# Patient Record
Sex: FEMALE | Race: WHITE | Employment: OTHER | URBAN - METROPOLITAN AREA
[De-identification: names, ages, dates, MRNs, and addresses within clinical notes are randomized per-mention and may not be internally consistent; named-entity substitution may affect disease eponyms.]

---

## 2017-01-09 RX ORDER — OXYBUTYNIN CHLORIDE 10 MG/1
TABLET, EXTENDED RELEASE ORAL
Qty: 30 TAB | Refills: 0 | Status: SHIPPED | OUTPATIENT
Start: 2017-01-09 | End: 2018-02-22

## 2017-02-01 ENCOUNTER — TELEPHONE (OUTPATIENT)
Dept: INTERNAL MEDICINE CLINIC | Age: 82
End: 2017-02-01

## 2017-02-02 NOTE — TELEPHONE ENCOUNTER
Patient finds the cost of the medications to be not sustainable.   Will consider change the Insulin to a generic form

## 2017-02-10 ENCOUNTER — TELEPHONE (OUTPATIENT)
Dept: INTERNAL MEDICINE CLINIC | Age: 82
End: 2017-02-10

## 2017-02-10 NOTE — TELEPHONE ENCOUNTER
Patient called and would like for Dr Shalini Turcios to come visit her in [address]. She has some things she would like to discuss with him.

## 2017-02-16 ENCOUNTER — DOCUMENTATION ONLY (OUTPATIENT)
Dept: INTERNAL MEDICINE CLINIC | Age: 82
End: 2017-02-16

## 2017-02-16 DIAGNOSIS — Z79.4 CONTROLLED TYPE 2 DIABETES MELLITUS WITHOUT COMPLICATION, WITH LONG-TERM CURRENT USE OF INSULIN (HCC): Primary | ICD-10-CM

## 2017-02-16 DIAGNOSIS — E11.9 CONTROLLED TYPE 2 DIABETES MELLITUS WITHOUT COMPLICATION, WITH LONG-TERM CURRENT USE OF INSULIN (HCC): Primary | ICD-10-CM

## 2017-02-16 DIAGNOSIS — R35.1 NOCTURIA: ICD-10-CM

## 2017-02-16 DIAGNOSIS — I10 ESSENTIAL HYPERTENSION: ICD-10-CM

## 2017-02-16 DIAGNOSIS — E05.00 THYROTOXICOSIS WITH DIFFUSE GOITER AND WITHOUT THYROID STORM: ICD-10-CM

## 2017-02-17 NOTE — PROGRESS NOTES
The patient is being evaluated by me today at [facility] for the chief complaint of nocturia    HPI     The patient remains on medications for type II diabetes mellitus. The sugars are doing fairly well. The patient is ambulating better with physical therapy but the ambulation is still impaired. The patient complains with problems of urinary frequency - most marked with nocturia three times per night. Since that patient's ambulation is still impaired she often wets the bed. Review of Systems   Respiratory: Negative for shortness of breath. Cardiovascular: Negative for chest pain and leg swelling. Genitourinary: Positive for frequency. Nocturia       Allergies   Allergen Reactions    Nitrofurantoin Nausea and Vomiting       Current Outpatient Prescriptions   Medication Sig Dispense Refill    oxybutynin chloride XL (DITROPAN XL) 10 mg CR tablet TAKE 1 TABLET BY MOUTH AT BEDTIME FOR BLADDER CONTGROL 30 Tab 0    amLODIPine (NORVASC) 2.5 mg tablet Take  by mouth daily.  insulin glargine (LANTUS) 100 unit/mL injection 5 Units by SubCUTAneous route daily (with breakfast).  nateglinide (STARLIX) 60 mg tablet Take 60 mg by mouth Before breakfast, lunch, and dinner.  ascorbic acid, vitamin C, (VITAMIN C) 250 mg tablet Take  by mouth.  acetaminophen (TYLENOL) 650 mg suppository Insert  into rectum every four (4) hours as needed for Fever.  metoprolol succinate (TOPROL XL) 50 mg XL tablet Take  by mouth daily.  insulin lispro (HUMALOG) 100 unit/mL injection by SubCUTAneous route Before breakfast, lunch, dinner and at bedtime. Indications: 2-10 units      insulin lispro (HUMALOG) 100 unit/mL injection by SubCUTAneous route daily. Indications: 8 units      ferrous sulfate (FEOSOL) 325 mg (65 mg iron) tablet Take  by mouth Daily (before breakfast).  polyethylene glycol (MIRALAX) 17 gram packet Take 17 g by mouth daily.       butalbital-acetaminophen-caffeine (FIORICET) -40 mg per tablet Take 1 Tab by mouth every four (4) hours as needed for Pain.  glucose blood VI test strips (ASCENSIA CONTOUR) strip USE TO TEST BLOOD SUGARS twice TIMES DAILY dx.e11.9 300 Strip 3    insulin aspart (NOVOLOG) 100 unit/mL injection Inject 8 units daily (Patient taking differently: 6 Units by SubCUTAneous route once. Inject 8 units daily) 10 mL 1    TRUEPLUS LANCETS 30 gauge misc Use to check blood glucose up to twice daily DX:e11.9 300 Lancet 3    glimepiride (AMARYL) 2 mg tablet 1 tablet at breakfast.  1 tablet at supper 180 Tab 3    alendronate (FOSAMAX) 70 mg tablet TAKE 1 TABLET WEEKLY 12 Tab 3    lovastatin (MEVACOR) 20 mg tablet TAKE 1 TABLET DAILY 90 Tab 3    ASCENSIA CONTOUR strip USE TO TEST BLOOD SUGARS THREE TIMES DAILY 100 Strip 11    melatonin 3 mg tablet Take 3 mg by mouth nightly as needed.  methimazole (TAPAZOLE) 5 mg tablet Take 1 Tab by mouth daily. 90 Tab 3    cyanocobalamin (VITAMIN B-12) 1,000 mcg tablet Take 1,000 mcg by mouth daily.  omega-3 fatty acids-vitamin e (FISH OIL) 1,000 mg Cap Take 1 Cap by mouth two (2) times a day.          Past Medical History:   Diagnosis Date    Abnormality of gait     Acute upper respiratory infections of unspecified site     Broken shoulder     right    Chest pain     Chronic airway obstruction, not elsewhere classified     COPD     Diabetes ([address]     Essential hypertension, benign     Hearing loss     History of tachycardia     possible SVT    Hypercholesterolemia     Hyperlipidemia     Hypertension     Incontinence     Mitral valve prolapse     MVP (mitral valve prolapse)     OAB (overactive bladder)     Osteoarthritis     spine    Osteoporosis     Pure hypercholesterolemia     Senile osteoporosis     Thyroid disease     Thyrotoxicosis     Type II or unspecified type diabetes mellitus without mention of complication, not stated as uncontrolled     Urinary tract infection, site not specified Past Surgical History:   Procedure Laterality Date    HX KNEE REPLACEMENT  left    HX OPEN REDUCTION INTERNAL FIXATION Left     patellar fracture    HX OTHER SURGICAL Right     shoulder surgery    REMV CATARACT EXTRACAP,INSERT LENS,COMP  [id number]    bilateral       Social History     Social History    Marital status:      Spouse name: N/A    Number of children: N/A    Years of education: N/A     Occupational History    Not on file. Social History Main Topics    Smoking status: Never Smoker    Smokeless tobacco: Never Used    Alcohol use No    Drug use: No    Sexual activity: Not on file     Other Topics Concern    Not on file     Social History Narrative    No narrative on file       Patient does not have an advanced directive on file    VS:    78   P 82        Pulse Ox 95%  Pain: 0/10    Physical Exam   Cardiovascular: Normal rate and regular rhythm. Exam reveals no gallop. No murmur heard. Pulmonary/Chest: She has no wheezes. She has no rales. Abdominal: Soft. She exhibits no distension. There is no tenderness. Musculoskeletal: She exhibits no edema. No visits with results within 3 Month(s) from this visit. Latest known visit with results is:    Hospital Outpatient Visit on 10/24/2016   Component Date Value Ref Range Status    Hemoglobin A1c 10/24/2016 7.8* 4.2 - 5.6 % Final    Comment: (NOTE)  HbA1C Interpretive Ranges  <5.7              Normal  5.7 - 6.4         Consider Prediabetes  >6.5              Consider Diabetes      Est. average glucose 10/24/2016 177  mg/dL Final    Comment: (NOTE)  The eAG should be interpreted with patient characteristics in mind   since ethnicity, interindividual differences, red cell lifespan,   variation in rates of glycation, etc. may affect the validity of the   calculation.       Sodium 10/24/2016 138  136 - 145 mmol/L Final    Potassium 10/24/2016 4.2  3.5 - 5.5 mmol/L Final    Chloride 10/24/2016 102  100 - 108 mmol/L Final    CO2 10/24/2016 29  21 - 32 mmol/L Final    Anion gap 10/24/2016 7  3.0 - 18 mmol/L Final    Glucose 10/24/2016 118* 74 - 99 mg/dL Final    BUN 10/24/2016 32* 7.0 - 18 MG/DL Final    Creatinine 10/24/2016 0.86  0.6 - 1.3 MG/DL Final    BUN/Creatinine ratio 10/24/2016 37* 12 - 20   Final    GFR est AA 10/24/2016 >60  >60 ml/min/1.73m2 Final    GFR est non-AA 10/24/2016 >60  >60 ml/min/1.73m2 Final    Comment: (NOTE)  Estimated GFR is calculated using the Modification of Diet in Renal   Disease (MDRD) Study equation, reported for both  Americans   (GFRAA) and non- Americans (GFRNA), and normalized to 1.73m2   body surface area. The physician must decide which value applies to   the patient. The MDRD study equation should only be used in   individuals age 25 or older. It has not been validated for the   following: pregnant women, patients with serious comorbid conditions,   or on certain medications, or persons with extremes of body size,   muscle mass, or nutritional status.  Calcium 10/24/2016 9.2  8.5 - 10.1 MG/DL Final       .No results found for any visits on 02/16/17. Assessment / Plan:      ICD-10-CM ICD-9-CM    1. Controlled type 2 diabetes mellitus without complication, with long-term current use of insulin ([facility]) E11.9 250.00     Z79.4 V58.67    2. Essential hypertension I10 401.9    3. Nocturia R35.1 788.43    4. 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   Cheryle Albino states that she understands the treatment plan and agrees with the treatment plan    LOS:  [id number]

## 2017-02-24 ENCOUNTER — TELEPHONE (OUTPATIENT)
Dept: INTERNAL MEDICINE CLINIC | Age: 82
End: 2017-02-24

## 2017-02-24 NOTE — TELEPHONE ENCOUNTER
[facility] called to let Dr Ortiz Jensen know that Ms Robert Bran had 2 falls today. She broke her fall with her wrist.  She seems to be doing okay. She thinks the UTI contributed to her fall. They did receive the order for the amoxicillin.

## 2017-02-26 PROBLEM — R35.1 NOCTURIA: Status: ACTIVE | Noted: 2017-02-26

## 2017-03-06 ENCOUNTER — TELEPHONE (OUTPATIENT)
Dept: INTERNAL MEDICINE CLINIC | Age: 82
End: 2017-03-06

## 2017-03-06 NOTE — TELEPHONE ENCOUNTER
Unable to reach patient by phone and there's no voice mail to leave message--letter has been received.

## 2017-03-06 NOTE — TELEPHONE ENCOUNTER
Patient called to check and see if we received a letter that she mailed to us. It is a letter from [facility] that she needs Dr Blanche Jensen to look into. She would like for Yanelis Jasso to give her a call back at [phone] to discuss.

## 2017-03-16 RX ORDER — INSULIN ASPART 100 [IU]/ML
INJECTION, SOLUTION INTRAVENOUS; SUBCUTANEOUS
Qty: 10 ML | Refills: 1 | Status: SHIPPED | OUTPATIENT
Start: 2017-03-16 | End: 2017-05-08

## 2017-03-22 ENCOUNTER — HOSPITAL ENCOUNTER (OUTPATIENT)
Dept: LAB | Age: 82
Discharge: HOME | End: 2017-03-22
Payer: MEDICARE

## 2017-03-22 ENCOUNTER — OFFICE VISIT (OUTPATIENT)
Dept: INTERNAL MEDICINE CLINIC | Age: 82
End: 2017-03-22

## 2017-03-22 VITALS
BODY MASS INDEX: 18.4 KG/M2 | SYSTOLIC BLOOD PRESSURE: 110 MMHG | DIASTOLIC BLOOD PRESSURE: 60 MMHG | TEMPERATURE: 97.5 F | HEIGHT: 62 IN | HEART RATE: 60 BPM | WEIGHT: 100 LBS | RESPIRATION RATE: 16 BRPM

## 2017-03-22 DIAGNOSIS — Z86.79 HX OF SUBDURAL HEMATOMA: ICD-10-CM

## 2017-03-22 DIAGNOSIS — Z79.4 CONTROLLED TYPE 2 DIABETES MELLITUS WITHOUT COMPLICATION, WITH LONG-TERM CURRENT USE OF INSULIN (HCC): ICD-10-CM

## 2017-03-22 DIAGNOSIS — E11.9 CONTROLLED TYPE 2 DIABETES MELLITUS WITHOUT COMPLICATION, WITH LONG-TERM CURRENT USE OF INSULIN (HCC): Primary | ICD-10-CM

## 2017-03-22 DIAGNOSIS — I10 ESSENTIAL HYPERTENSION: ICD-10-CM

## 2017-03-22 DIAGNOSIS — E11.9 CONTROLLED TYPE 2 DIABETES MELLITUS WITHOUT COMPLICATION, WITH LONG-TERM CURRENT USE OF INSULIN (HCC): ICD-10-CM

## 2017-03-22 DIAGNOSIS — E05.90 HYPERTHYROIDISM: ICD-10-CM

## 2017-03-22 DIAGNOSIS — Z79.4 CONTROLLED TYPE 2 DIABETES MELLITUS WITHOUT COMPLICATION, WITH LONG-TERM CURRENT USE OF INSULIN (HCC): Primary | ICD-10-CM

## 2017-03-22 DIAGNOSIS — N39.0 URINARY TRACT INFECTION, SITE UNSPECIFIED: ICD-10-CM

## 2017-03-22 LAB
ALBUMIN SERPL BCP-MCNC: 3.5 G/DL (ref 3.4–5)
ALBUMIN/GLOB SERPL: 0.9 {RATIO} (ref 0.8–1.7)
ALP SERPL-CCNC: 50 U/L (ref 45–117)
ALT SERPL-CCNC: 23 U/L (ref 13–56)
ANION GAP BLD CALC-SCNC: 7 MMOL/L (ref 3–18)
AST SERPL W P-5'-P-CCNC: 19 U/L (ref 15–37)
BASOPHILS # BLD AUTO: 0 K/UL (ref 0–0.06)
BASOPHILS # BLD: 0 % (ref 0–2)
BILIRUB SERPL-MCNC: 0.5 MG/DL (ref 0.2–1)
BUN SERPL-MCNC: 38 MG/DL (ref 7–18)
BUN/CREAT SERPL: 32 (ref 12–20)
CALCIUM SERPL-MCNC: 9.4 MG/DL (ref 8.5–10.1)
CHLORIDE SERPL-SCNC: 100 MMOL/L (ref 100–108)
CO2 SERPL-SCNC: 29 MMOL/L (ref 21–32)
CREAT SERPL-MCNC: 1.2 MG/DL (ref 0.6–1.3)
DIFFERENTIAL METHOD BLD: ABNORMAL
EOSINOPHIL # BLD: 0.1 K/UL (ref 0–0.4)
EOSINOPHIL NFR BLD: 2 % (ref 0–5)
ERYTHROCYTE [DISTWIDTH] IN BLOOD BY AUTOMATED COUNT: 14.2 % (ref 11.6–14.5)
EST. AVERAGE GLUCOSE BLD GHB EST-MCNC: 163 MG/DL
GLOBULIN SER CALC-MCNC: 3.8 G/DL (ref 2–4)
GLUCOSE SERPL-MCNC: 219 MG/DL (ref 74–99)
HBA1C MFR BLD: 7.3 % (ref 4.2–5.6)
HCT VFR BLD AUTO: 28.8 % (ref 35–45)
HGB BLD-MCNC: 11.5 G/DL (ref 12–16)
LYMPHOCYTES # BLD AUTO: 13 % (ref 21–52)
LYMPHOCYTES # BLD: 0.7 K/UL (ref 0.9–3.6)
MCH RBC QN AUTO: 38.3 PG (ref 24–34)
MCHC RBC AUTO-ENTMCNC: 39.9 G/DL (ref 31–37)
MCV RBC AUTO: 96 FL (ref 74–97)
MONOCYTES # BLD: 0.7 K/UL (ref 0.05–1.2)
MONOCYTES NFR BLD AUTO: 13 % (ref 3–10)
NEUTS SEG # BLD: 3.7 K/UL (ref 1.8–8)
NEUTS SEG NFR BLD AUTO: 72 % (ref 40–73)
PLATELET # BLD AUTO: 245 K/UL (ref 135–420)
PMV BLD AUTO: 10.7 FL (ref 9.2–11.8)
POTASSIUM SERPL-SCNC: 4.9 MMOL/L (ref 3.5–5.5)
PROT SERPL-MCNC: 7.3 G/DL (ref 6.4–8.2)
RBC # BLD AUTO: 3 M/UL (ref 4.2–5.3)
SODIUM SERPL-SCNC: 136 MMOL/L (ref 136–145)
T3FREE SERPL-MCNC: 2.6 PG/ML (ref 2.3–4.2)
T4 FREE SERPL-MCNC: 0.7 NG/DL (ref 0.7–1.5)
TSH SERPL DL<=0.05 MIU/L-ACNC: 4.41 UIU/ML (ref 0.36–3.74)
WBC # BLD AUTO: 5.2 K/UL (ref 4.6–13.2)

## 2017-03-22 PROCEDURE — 85025 COMPLETE CBC W/AUTO DIFF WBC: CPT | Performed by: INTERNAL MEDICINE

## 2017-03-22 PROCEDURE — 80053 COMPREHEN METABOLIC PANEL: CPT | Performed by: INTERNAL MEDICINE

## 2017-03-22 PROCEDURE — 83036 HEMOGLOBIN GLYCOSYLATED A1C: CPT | Performed by: INTERNAL MEDICINE

## 2017-03-22 PROCEDURE — 84443 ASSAY THYROID STIM HORMONE: CPT | Performed by: INTERNAL MEDICINE

## 2017-03-22 PROCEDURE — 84439 ASSAY OF FREE THYROXINE: CPT | Performed by: INTERNAL MEDICINE

## 2017-03-22 PROCEDURE — 84481 FREE ASSAY (FT-3): CPT | Performed by: INTERNAL MEDICINE

## 2017-03-22 NOTE — PATIENT INSTRUCTIONS
Health Maintenance Due   Topic Date Due    Diabetic Foot Care  04/24/1936    Shingles Vaccine  04/24/1986    Pneumococcal Vaccine (1 of 2 - PCV13) 04/24/1991    Flu Vaccine  08/01/2016    Cholesterol Test   10/16/2016    Albumin Urine Test  01/19/2017

## 2017-03-22 NOTE — MR AVS SNAPSHOT
Visit Information Date & Time Provider Department Dept. Phone Encounter #  
 3/22/2017 10:15 AM Justus Maloney MD [facility] INTERNAL MEDICINE OF [place] [phone] [id number] Your Appointments 4/28/2017 10:30 AM  
ULTRASOUND with [facility] ULTRASOUND [facility] ([address]) Appt Note: JESSICA- JBM  
 [address] [place] [address] [id number]  
  
    
 5/24/2017  8:30 AM  
ESTABLISHED PATIENT with Michael Escoto MD  
[facility] of [facility] [address]) Appt Note: annual f.u rv JESSICA  
 [address], [place] [address]  
  
   
 [address] [facility][1], [address] [facility] [address] Upcoming Health Maintenance Date Due  
 FOOT EXAM Q1 4/24/1936 ZOSTER VACCINE AGE 60> 4/24/1986 Pneumococcal 65+ Low/Medium Risk (1 of 2 - PCV13) 4/24/1991 INFLUENZA AGE 9 TO ADULT 8/1/2016 LIPID PANEL Q1 10/16/2016 MICROALBUMIN Q1 1/19/2017 HEMOGLOBIN A1C Q6M 4/24/2017 MEDICARE YEARLY EXAM 7/22/2017 EYE EXAM RETINAL OR DILATED Q1 2/1/2018 GLAUCOMA SCREENING Q2Y 2/1/2019 DTaP/Tdap/Td series (2 - Td) 3/9/2025 Allergies as of 3/22/2017  Review Complete On: 3/22/2017 By: Richy Bar LPN Severity Noted Reaction Type Reactions Nitrofurantoin  12/02/2015   Systemic Nausea and Vomiting Current Immunizations  Never Reviewed Name Date Influenza High Dose Vaccine PF 9/22/2015 Tdap 3/9/2015  9:28 PM  
  
 Not reviewed this visit You Were Diagnosed With   
  
 Codes Comments Controlled type 2 diabetes mellitus without complication, with long-term current use of insulin (HCC)    -  Primary ICD-10-CM: E11.9, Z79.4 ICD-9-CM: 250.00, V58.67 Essential hypertension     ICD-10-CM: I10 
ICD-9-CM: 401.9 Urinary tract infection, site unspecified     ICD-10-CM: N39.0 Hyperthyroidism     ICD-10-CM: E05.90 ICD-9-CM: 242.90 Hx of subdural hematoma     ICD-10-CM: Z86.79 
ICD-9-CM: V12.59 Vitals BP Pulse Temp Resp Height(growth percentile) 110/60 (BP 1 Location: Left arm, BP Patient Position: Sitting) 60 97.5 °F (36.4 °C) (Tympanic) 16 5' 2\" (1.575 m) Weight(growth percentile) BMI OB Status Smoking Status 100 lb (45.4 kg) 18.29 kg/m2 Postmenopausal Never Smoker Vitals History BMI and BSA Data Body Mass Index Body Surface Area  
 18.29 kg/m 2 1.41 m 2 Preferred Pharmacy Pharmacy Name Phone [facility] [address] [address] [facility] 2/3 [phone] Your Updated Medication List  
  
   
This list is accurate as of: 3/22/17 12:04 PM.  Always use your most recent med list.  
  
  
  
  
 acetaminophen 650 mg suppository Commonly known as:  TYLENOL Insert  into rectum every four (4) hours as needed for Fever. alendronate 70 mg tablet Commonly known as:  FOSAMAX TAKE 1 TABLET WEEKLY * Ascensia CONTOUR strip Generic drug:  glucose blood VI test strips USE TO TEST BLOOD SUGARS THREE TIMES DAILY * glucose blood VI test strips strip Commonly known as:  Ascensia CONTOUR  
USE TO TEST BLOOD SUGARS twice TIMES DAILY dx.e11.9 FEOSOL 325 mg (65 mg iron) tablet Generic drug:  ferrous sulfate Take  by mouth Daily (before breakfast). FIORICET -40 mg per tablet Generic drug:  butalbital-acetaminophen-caffeine Take 1 Tab by mouth every four (4) hours as needed for Pain. FISH OIL 1,000 mg Cap Generic drug:  omega-3 fatty acids-vitamin e Take 1 Cap by mouth two (2) times a day. glimepiride 2 mg tablet Commonly known as:  AMARYL  
1 tablet at breakfast.  1 tablet at supper  
  
 insulin aspart 100 unit/mL injection Commonly known as:  Chava Obrien Inject 8 units daily * insulin lispro 100 unit/mL injection Commonly known as:  HUMALOG by SubCUTAneous route Before breakfast, lunch, dinner and at bedtime. Indications: 2-10 units * insulin lispro 100 unit/mL injection Commonly known as:  HUMALOG  
by SubCUTAneous route daily. Indications: 8 units LANTUS 100 unit/mL injection Generic drug:  insulin glargine 5 Units by SubCUTAneous route daily (with breakfast). lovastatin 20 mg tablet Commonly known as:  MEVACOR  
TAKE 1 TABLET DAILY  
  
 melatonin 3 mg tablet Take 3 mg by mouth nightly as needed. methIMAzole 5 mg tablet Commonly known as:  TAPAZOLE Take 1 Tab by mouth daily. MIRALAX 17 gram packet Generic drug:  polyethylene glycol Take 17 g by mouth daily. NORVASC 2.5 mg tablet Generic drug:  amLODIPine Take  by mouth daily. oxybutynin chloride XL 10 mg CR tablet Commonly known as:  DITROPAN XL  
TAKE 1 TABLET BY MOUTH AT BEDTIME FOR BLADDER CONTGROL STARLIX 60 mg tablet Generic drug:  nateglinide Take 60 mg by mouth Before breakfast, lunch, and dinner. TOPROL XL 50 mg XL tablet Generic drug:  metoprolol succinate Take  by mouth daily. TRUEPLUS LANCETS 30 gauge Misc Generic drug:  lancets Use to check blood glucose up to twice daily DX:e11.9 VITAMIN B-12 1,000 mcg tablet Generic drug:  cyanocobalamin Take 1,000 mcg by mouth daily. VITAMIN C 250 mg tablet Generic drug:  ascorbic acid (vitamin C) Take  by mouth. * Notice: This list has 4 medication(s) that are the same as other medications prescribed for you. Read the directions carefully, and ask your doctor or other care provider to review them with you. Patient Instructions Health Maintenance Due Topic Date Due  
 Diabetic Foot Care  04/24/1936  Shingles Vaccine  04/24/1986  Pneumococcal Vaccine (1 of 2 - PCV13) 04/24/1991  Flu Vaccine  08/01/2016  Cholesterol Test   10/16/2016  Albumin Urine Test  01/19/2017 Introducing [facility]! Sonja Munroe introduces [employer] patient portal. Now you can access parts of your medical record, email your doctor's office, and request medication refills online. 1. In your internet browser, go to [email or web address] 2. Click on the First Time User? Click Here link in the Sign In box. You will see the New Member Sign Up page. 3. Enter your [employer] Access Code exactly as it appears below. You will not need to use this code after youve completed the sign-up process. If you do not sign up before the expiration date, you must request a new code. · [employer] Access Code: [email or web address] Expires: 6/20/2017 12:04 PM 
 
4. Enter the last four digits of your Social Security Number (xxxx) and Date of Birth (mm/dd/yyyy) as indicated and click Submit. You will be taken to the next sign-up page. 5. Create a [employer] ID. This will be your [employer] login ID and cannot be changed, so think of one that is secure and easy to remember. 6. Create a [employer] password. You can change your password at any time. 7. Enter your Password Reset Question and Answer. This can be used at a later time if you forget your password. 8. Enter your e-mail address. You will receive e-mail notification when new information is available in [address] 9. Click Sign Up. You can now view and download portions of your medical record. 10. Click the Download Summary menu link to download a portable copy of your medical information. If you have questions, please visit the Frequently Asked Questions section of the [employer] website. Remember, [employer] is NOT to be used for urgent needs. For medical emergencies, dial 911. Now available from your iPhone and Android! Please provide this summary of care documentation to your next provider. Your primary care clinician is listed as Afua Poon. If you have any questions after today's visit, please call [phone].

## 2017-03-22 NOTE — PROGRESS NOTES
1. Have you been to the ER, urgent care clinic since your last visit? Hospitalized since your last visit? No    2. Have you seen or consulted any other health care providers outside of the [address] [place] [address] since your last visit? Include any pap smears or colon screening.  No

## 2017-03-22 NOTE — PROGRESS NOTES
The patient presents to the office today with the chief complaint of Diabetes Mellitus    HPI    The patient remains on Lantus and a short acting insulin for type II diabetes mellitus. she has her  blood sugars twice daily. The sugars are doing ok. The patient has not had any low sugars. The patient remains on medications for hyperthyroidism. she is tolerating the medications well. The patient's weight is stable despite her complaints regarding the menus available at the adult home. The patient persists with dysuria. Review of Systems   Respiratory: Negative for shortness of breath. Cardiovascular: Negative for chest pain and leg swelling. Genitourinary: Positive for dysuria. Allergies   Allergen Reactions    Nitrofurantoin Nausea and Vomiting       Current Outpatient Prescriptions   Medication Sig Dispense Refill    insulin aspart (NOVOLOG) 100 unit/mL injection Inject 8 units daily 10 mL 1    oxybutynin chloride XL (DITROPAN XL) 10 mg CR tablet TAKE 1 TABLET BY MOUTH AT BEDTIME FOR BLADDER CONTGROL 30 Tab 0    amLODIPine (NORVASC) 2.5 mg tablet Take  by mouth daily.  insulin glargine (LANTUS) 100 unit/mL injection 5 Units by SubCUTAneous route daily (with breakfast).  nateglinide (STARLIX) 60 mg tablet Take 60 mg by mouth Before breakfast, lunch, and dinner.  ascorbic acid, vitamin C, (VITAMIN C) 250 mg tablet Take  by mouth.  acetaminophen (TYLENOL) 650 mg suppository Insert  into rectum every four (4) hours as needed for Fever.  metoprolol succinate (TOPROL XL) 50 mg XL tablet Take  by mouth daily.  insulin lispro (HUMALOG) 100 unit/mL injection by SubCUTAneous route Before breakfast, lunch, dinner and at bedtime. Indications: 2-10 units      insulin lispro (HUMALOG) 100 unit/mL injection by SubCUTAneous route daily. Indications: 8 units      ferrous sulfate (FEOSOL) 325 mg (65 mg iron) tablet Take  by mouth Daily (before breakfast).       polyethylene glycol (MIRALAX) 17 gram packet Take 17 g by mouth daily.  butalbital-acetaminophen-caffeine (FIORICET) -40 mg per tablet Take 1 Tab by mouth every four (4) hours as needed for Pain.  glucose blood VI test strips (ASCENSIA CONTOUR) strip USE TO TEST BLOOD SUGARS twice TIMES DAILY dx.e11.9 300 Strip 3    TRUEPLUS LANCETS 30 gauge misc Use to check blood glucose up to twice daily DX:e11.9 300 Lancet 3    glimepiride (AMARYL) 2 mg tablet 1 tablet at breakfast.  1 tablet at supper 180 Tab 3    alendronate (FOSAMAX) 70 mg tablet TAKE 1 TABLET WEEKLY 12 Tab 3    lovastatin (MEVACOR) 20 mg tablet TAKE 1 TABLET DAILY 90 Tab 3    ASCENSIA CONTOUR strip USE TO TEST BLOOD SUGARS THREE TIMES DAILY 100 Strip 11    melatonin 3 mg tablet Take 3 mg by mouth nightly as needed.  methimazole (TAPAZOLE) 5 mg tablet Take 1 Tab by mouth daily. 90 Tab 3    cyanocobalamin (VITAMIN B-12) 1,000 mcg tablet Take 1,000 mcg by mouth daily.  omega-3 fatty acids-vitamin e (FISH OIL) 1,000 mg Cap Take 1 Cap by mouth two (2) times a day.          Past Medical History:   Diagnosis Date    Abnormality of gait     Acute upper respiratory infections of unspecified site     Broken shoulder     right    Chest pain     Chronic airway obstruction, not elsewhere classified ([address]     COPD     Diabetes ([address]     Essential hypertension, benign     Hearing loss     History of tachycardia     possible SVT    Hypercholesterolemia     Hyperlipidemia     Hypertension     Incontinence     Mitral valve prolapse     MVP (mitral valve prolapse)     OAB (overactive bladder)     Osteoarthritis     spine    Osteoporosis     Pure hypercholesterolemia     Senile osteoporosis     Thyroid disease     Thyrotoxicosis     Type II or unspecified type diabetes mellitus without mention of complication, not stated as uncontrolled     Urinary tract infection, site not specified        Past Surgical History:   Procedure Laterality Date    HX KNEE REPLACEMENT  left    HX OPEN REDUCTION INTERNAL FIXATION Left     patellar fracture    HX OTHER SURGICAL Right     shoulder surgery    REMV CATARACT EXTRACAP,INSERT LENS,COMP  [id number]    bilateral       Social History     Social History    Marital status:      Spouse name: N/A    Number of children: N/A    Years of education: N/A     Occupational History    Not on file. Social History Main Topics    Smoking status: Never Smoker    Smokeless tobacco: Never Used    Alcohol use No    Drug use: No    Sexual activity: No     Other Topics Concern    Not on file     Social History Narrative       Patient does not have an advanced directive on file    Visit Vitals    /60 (BP 1 Location: Left arm, BP Patient Position: Sitting)    Pulse 60    Temp 97.5 °F (36.4 °C) (Tympanic)    Resp 16    Ht 5' 2\" (1.575 m)    Wt 100 lb (45.4 kg)    BMI 18.29 kg/m2       Physical Exam   Neck: Carotid bruit is not present. No thyromegaly present. Cardiovascular: Normal rate and regular rhythm. Exam reveals no gallop. No murmur heard. Pulmonary/Chest: She has no wheezes. She has no rales. Abdominal: Soft. She exhibits no distension. There is no tenderness. Musculoskeletal: She exhibits no edema. Lymphadenopathy:     She has no cervical adenopathy.          BMI:  Low due to thyroid disease but stable    Hospital Outpatient Visit on 03/22/2017   Component Date Value Ref Range Status    WBC 03/22/2017 5.2  4.6 - 13.2 K/uL Final    RBC 03/22/2017 3.00* 4.20 - 5.30 M/uL Final    HGB 03/22/2017 11.5* 12.0 - 16.0 g/dL Final    HCT 03/22/2017 28.8* 35.0 - 45.0 % Final    MCV 03/22/2017 96.0  74.0 - 97.0 FL Final    MCH 03/22/2017 38.3* 24.0 - 34.0 PG Final    MCHC 03/22/2017 39.9* 31.0 - 37.0 g/dL Final    RDW 03/22/2017 14.2  11.6 - 14.5 % Final    PLATELET 38/60/5180 634  135 - 420 K/uL Final    MPV 03/22/2017 10.7  9.2 - 11.8 FL Final    NEUTROPHILS 03/22/2017 72  40 - 73 % Final    LYMPHOCYTES 03/22/2017 13* 21 - 52 % Final    MONOCYTES 03/22/2017 13* 3 - 10 % Final    EOSINOPHILS 03/22/2017 2  0 - 5 % Final    BASOPHILS 03/22/2017 0  0 - 2 % Final    ABS. NEUTROPHILS 03/22/2017 3.7  1.8 - 8.0 K/UL Final    ABS. LYMPHOCYTES 03/22/2017 0.7* 0.9 - 3.6 K/UL Final    ABS. MONOCYTES 03/22/2017 0.7  0.05 - 1.2 K/UL Final    ABS. EOSINOPHILS 03/22/2017 0.1  0.0 - 0.4 K/UL Final    ABS. BASOPHILS 03/22/2017 0.0  0.0 - 0.06 K/UL Final    DF 03/22/2017 AUTOMATED    Final    Triiodothyronine (T3), free 03/22/2017 2.6  2.3 - 4.2 PG/ML Final    T4, Free 03/22/2017 0.7  0.7 - 1.5 NG/DL Final    Sodium 03/22/2017 136  136 - 145 mmol/L Final    Potassium 03/22/2017 4.9  3.5 - 5.5 mmol/L Final    Chloride 03/22/2017 100  100 - 108 mmol/L Final    CO2 03/22/2017 29  21 - 32 mmol/L Final    Anion gap 03/22/2017 7  3.0 - 18 mmol/L Final    Glucose 03/22/2017 219* 74 - 99 mg/dL Final    BUN 03/22/2017 38* 7.0 - 18 MG/DL Final    Creatinine 03/22/2017 1.20  0.6 - 1.3 MG/DL Final    BUN/Creatinine ratio 03/22/2017 32* 12 - 20   Final    GFR est AA 03/22/2017 51* >60 ml/min/1.73m2 Final    GFR est non-AA 03/22/2017 42* >60 ml/min/1.73m2 Final    Comment: (NOTE)  Estimated GFR is calculated using the Modification of Diet in Renal   Disease (MDRD) Study equation, reported for both  Americans   (GFRAA) and non- Americans (GFRNA), and normalized to 1.73m2   body surface area. The physician must decide which value applies to   the patient. The MDRD study equation should only be used in   individuals age 25 or older. It has not been validated for the   following: pregnant women, patients with serious comorbid conditions,   or on certain medications, or persons with extremes of body size,   muscle mass, or nutritional status.       Calcium 03/22/2017 9.4  8.5 - 10.1 MG/DL Final    Bilirubin, total 03/22/2017 0.5  0.2 - 1.0 MG/DL Final    ALT (SGPT) 03/22/2017 23 13 - 56 U/L Final    AST (SGOT) 03/22/2017 19  15 - 37 U/L Final    Alk. phosphatase 03/22/2017 50  45 - 117 U/L Final    Protein, total 03/22/2017 7.3  6.4 - 8.2 g/dL Final    Albumin 03/22/2017 3.5  3.4 - 5.0 g/dL Final    Globulin 03/22/2017 3.8  2.0 - 4.0 g/dL Final    A-G Ratio 03/22/2017 0.9  0.8 - 1.7   Final    TSH 03/22/2017 4.41* 0.36 - 3.74 uIU/mL Final    Hemoglobin A1c 03/22/2017 7.3* 4.2 - 5.6 % Final    Comment: (NOTE)  HbA1C Interpretive Ranges  <5.7              Normal  5.7 - 6.4         Consider Prediabetes  >6.5              Consider Diabetes      Est. average glucose 03/22/2017 163  mg/dL Final    Comment: (NOTE)  The eAG should be interpreted with patient characteristics in mind   since ethnicity, interindividual differences, red cell lifespan,   variation in rates of glycation, etc. may affect the validity of the   calculation. .  Results for orders placed or performed during the hospital encounter of 03/22/17   CBC WITH AUTOMATED DIFF   Result Value Ref Range    WBC 5.2 4.6 - 13.2 K/uL    RBC 3.00 (L) 4.20 - 5.30 M/uL    HGB 11.5 (L) 12.0 - 16.0 g/dL    HCT 28.8 (L) 35.0 - 45.0 %    MCV 96.0 74.0 - 97.0 FL    MCH 38.3 (H) 24.0 - 34.0 PG    MCHC 39.9 (H) 31.0 - 37.0 g/dL    RDW 14.2 11.6 - 14.5 %    PLATELET 323 689 - 243 K/uL    MPV 10.7 9.2 - 11.8 FL    NEUTROPHILS 72 40 - 73 %    LYMPHOCYTES 13 (L) 21 - 52 %    MONOCYTES 13 (H) 3 - 10 %    EOSINOPHILS 2 0 - 5 %    BASOPHILS 0 0 - 2 %    ABS. NEUTROPHILS 3.7 1.8 - 8.0 K/UL    ABS. LYMPHOCYTES 0.7 (L) 0.9 - 3.6 K/UL    ABS. MONOCYTES 0.7 0.05 - 1.2 K/UL    ABS. EOSINOPHILS 0.1 0.0 - 0.4 K/UL    ABS.  BASOPHILS 0.0 0.0 - 0.06 K/UL    DF AUTOMATED     T3, FREE   Result Value Ref Range    Triiodothyronine (T3), free 2.6 2.3 - 4.2 PG/ML   T4, FREE   Result Value Ref Range    T4, Free 0.7 0.7 - 1.5 NG/DL   METABOLIC PANEL, COMPREHENSIVE   Result Value Ref Range    Sodium 136 136 - 145 mmol/L    Potassium 4.9 3.5 - 5.5 mmol/L Chloride 100 100 - 108 mmol/L    CO2 29 21 - 32 mmol/L    Anion gap 7 3.0 - 18 mmol/L    Glucose 219 (H) 74 - 99 mg/dL    BUN 38 (H) 7.0 - 18 MG/DL    Creatinine 1.20 0.6 - 1.3 MG/DL    BUN/Creatinine ratio 32 (H) 12 - 20      GFR est AA 51 (L) >60 ml/min/1.73m2    GFR est non-AA 42 (L) >60 ml/min/1.73m2    Calcium 9.4 8.5 - 10.1 MG/DL    Bilirubin, total 0.5 0.2 - 1.0 MG/DL    ALT (SGPT) 23 13 - 56 U/L    AST (SGOT) 19 15 - 37 U/L    Alk. phosphatase 50 45 - 117 U/L    Protein, total 7.3 6.4 - 8.2 g/dL    Albumin 3.5 3.4 - 5.0 g/dL    Globulin 3.8 2.0 - 4.0 g/dL    A-G Ratio 0.9 0.8 - 1.7     TSH 3RD GENERATION   Result Value Ref Range    TSH 4.41 (H) 0.36 - 3.74 uIU/mL   HEMOGLOBIN A1C   Result Value Ref Range    Hemoglobin A1c 7.3 (H) 4.2 - 5.6 %    Est. average glucose 163 mg/dL       Assessment / Plan      ICD-10-CM ICD-9-CM    1. Controlled type 2 diabetes mellitus without complication, with long-term current use of insulin ([facility]) R58.8 710.42 METABOLIC PANEL, COMPREHENSIVE    Z79.4 V58.67 CBC WITH AUTOMATED DIFF      TSH 3RD GENERATION      HEMOGLOBIN A1C WITH EAG      T3, FREE      T4, FREE      MA COLLECTION VENOUS BLOOD,VENIPUNCTURE   2. Essential hypertension E29 765.8 METABOLIC PANEL, COMPREHENSIVE      CBC WITH AUTOMATED DIFF      TSH 3RD GENERATION      HEMOGLOBIN A1C WITH EAG      T3, FREE      T4, FREE      MA COLLECTION VENOUS BLOOD,VENIPUNCTURE   3. Urinary tract infection, site unspecified O90.7  METABOLIC PANEL, COMPREHENSIVE      CBC WITH AUTOMATED DIFF      TSH 3RD GENERATION      HEMOGLOBIN A1C WITH EAG      T3, FREE      T4, FREE      MA COLLECTION VENOUS BLOOD,VENIPUNCTURE   4. Hyperthyroidism T40.77 847.82 METABOLIC PANEL, COMPREHENSIVE      CBC WITH AUTOMATED DIFF      TSH 3RD GENERATION      HEMOGLOBIN A1C WITH EAG      T3, FREE      T4, FREE      MA COLLECTION VENOUS BLOOD,VENIPUNCTURE   5.  Hx of subdural hematoma U84.98 P83.80 METABOLIC PANEL, COMPREHENSIVE      CBC WITH AUTOMATED DIFF TSH 3RD GENERATION      HEMOGLOBIN A1C WITH EAG      T3, FREE      T4, FREE      ME COLLECTION VENOUS BLOOD,VENIPUNCTURE     Add Pyridium  Discontinue Mevacor and Fosamax due to cost  she was advised to continue her maintenance medications    Follow-up Disposition:  Return in about 6 months (around 9/22/2017). I asked Genet Vays if she has any questions and I answered the questions.   Jessicajairon Analiliasusy states that she understands the treatment plan and agrees with the treatment plan

## 2017-04-05 ENCOUNTER — TELEPHONE (OUTPATIENT)
Dept: INTERNAL MEDICINE CLINIC | Age: 82
End: 2017-04-05

## 2017-04-07 NOTE — TELEPHONE ENCOUNTER
Unable to reach patient or leave a message--per Dr Jorge Waterman labs are ok except for elevated A1C-patient needs to make sure she is accepting the insulin at the facility. TSH also sightly elevated but T3 and T4 are ok so will leave alone for now.

## 2017-04-19 ENCOUNTER — DOCUMENTATION ONLY (OUTPATIENT)
Dept: INTERNAL MEDICINE CLINIC | Age: 82
End: 2017-04-19

## 2017-04-19 DIAGNOSIS — Z86.79 HX OF SUBDURAL HEMATOMA: ICD-10-CM

## 2017-04-19 DIAGNOSIS — I10 ESSENTIAL HYPERTENSION: ICD-10-CM

## 2017-04-19 DIAGNOSIS — Z79.4 CONTROLLED TYPE 2 DIABETES MELLITUS WITHOUT COMPLICATION, WITH LONG-TERM CURRENT USE OF INSULIN (HCC): Primary | ICD-10-CM

## 2017-04-19 DIAGNOSIS — E11.9 CONTROLLED TYPE 2 DIABETES MELLITUS WITHOUT COMPLICATION, WITH LONG-TERM CURRENT USE OF INSULIN (HCC): Primary | ICD-10-CM

## 2017-04-19 DIAGNOSIS — R35.1 NOCTURIA: ICD-10-CM

## 2017-04-20 NOTE — PROGRESS NOTES
The patient is being evaluated by me today at [facility] for the chief complaint of type II diabetes mellitus    HPI     The patient remains on both long term and short term insulin for type II diabetes mellitus. The patient's sugars have been doing ok. The patient is status post drainage of a subdural hematoma. She is doing ok but she continues with impaired ambulation. The patient is using a walker for safe ambulation. The patient complains of nocturia with problems with bladder control. The patient remains on medications for hypertension. She is tolerating the medications well. Review of Systems   Respiratory: Negative for shortness of breath. Cardiovascular: Negative for chest pain and leg swelling. Neurological: Positive for weakness. Allergies   Allergen Reactions    Nitrofurantoin Nausea and Vomiting       Current Outpatient Prescriptions   Medication Sig Dispense Refill    morphine (ROXANOL) 20 mg/mL concentrated oral syringe Take 0.25 mL by mouth every two (2) hours as needed for Pain (shortness of breath). Max Daily Amount: 60 mg. 30 mL 0    LORazepam (LORAZEPAM INTENSOL) 2 mg/mL concentrated solution Take 0.5 mL by mouth every four (4) hours as needed for Agitation or Anxiety (oral or sublingual). Max Daily Amount: 6 mg.  30 mL 0     Facility-Administered Medications Ordered in Other Visits   Medication Dose Route Frequency Provider Last Rate Last Dose    mineral oil (FLEET) enema   Rectal PRN Gely Romero MD        heparin (porcine) injection 5,000 Units  5,000 Units SubCUTAneous Q8H Gely Romero MD   5,000 Units at 05/08/17 0402    insulin lispro (HUMALOG) injection   SubCUTAneous AC&HS Gely Romero MD   Stopped at 05/07/17 2326    glucose chewable tablet 16 g  4 Tab Oral PRN Gely Romero MD        glucagon [facility]) injection 1 mg  1 mg IntraMUSCular PRN Gely Romero MD        dextrose (D50W) injection syrg 12.5-25 g  25-50 mL IntraVENous PRN Gely Eastern, MD  acetaminophen (TYLENOL) tablet 325 mg  325 mg Oral Q6H PRN Darlene Emersono, NP   325 mg at 05/05/17 1421    acetaminophen (TYLENOL) tablet 650 mg  650 mg Oral BID Darlene Peto, NP   650 mg at 05/07/17 1707    amLODIPine (NORVASC) tablet 5 mg  5 mg Oral DAILY BÁRBARA Garcia   5 mg at 05/07/17 2847    0.9% sodium chloride infusion  50 mL/hr IntraVENous CONTINUOUS Shirley Marks MD 50 mL/hr at 05/07/17 2053 50 mL/hr at 05/07/17 2053    polyethylene glycol (MIRALAX) packet 17 g  17 g Oral DAILY Shirley Marks MD   17 g at 05/07/17 0900    cyanocobalamin tablet 1,000 mcg  1,000 mcg Oral DAILY Leonarda Cardenas MD   1,000 mcg at 05/07/17 1350    ferrous sulfate tablet 325 mg  1 Tab Oral ACB Leonarda Cardenas MD   325 mg at 05/07/17 0814    melatonin tablet 3 mg  3 mg Oral QHS PRN Leonarda Cardenas MD   3 mg at 05/05/17 2128    methIMAzole (TAPAZOLE) tablet 5 mg  5 mg Oral DAILY Leonarda Cardenas MD   5 mg at 05/07/17 8774    sodium chloride (NS) flush 5-10 mL  5-10 mL IntraVENous Q8H Leonarda Cardenas MD   10 mL at 05/07/17 2326    sodium chloride (NS) flush 5-10 mL  5-10 mL IntraVENous PRN Leonarda Cardenas MD        magnesium hydroxide (MILK OF MAGNESIA) 400 mg/5 mL oral suspension 30 mL  30 mL Oral DAILY PRN Leonarda Cardenas MD   30 mL at 05/03/17 0103    docusate sodium (COLACE) capsule 100 mg  100 mg Oral BID Leonarda Cardeans MD   100 mg at 05/07/17 1706    aspirin chewable tablet 81 mg  81 mg Oral DAILY Leonarda Cardenas MD   81 mg at 05/07/17 1195    clopidogrel (PLAVIX) tablet 75 mg  75 mg Oral DAILY Leonarda Cardenas MD   75 mg at 05/07/17 6712    simvastatin (ZOCOR) tablet 10 mg  10 mg Oral QHS Leonarda Cardenas MD   10 mg at 05/07/17 2326       Past Medical History:   Diagnosis Date    Abnormality of gait     Acute upper respiratory infections of unspecified site     Broken shoulder     right    Chest pain     Chronic airway obstruction, not elsewhere classified     COPD     [facility] [address]     Essential hypertension, benign     Hearing loss     History of tachycardia     possible SVT    Hypercholesterolemia     Hyperlipidemia     Hypertension     Incontinence     Mitral valve prolapse     MVP (mitral valve prolapse)     OAB (overactive bladder)     Osteoarthritis     spine    Osteoporosis     Pure hypercholesterolemia     Senile osteoporosis     Thyroid disease     Thyrotoxicosis     Type II or unspecified type diabetes mellitus without mention of complication, not stated as uncontrolled     Urinary tract infection, site not specified        Past Surgical History:   Procedure Laterality Date    HX KNEE REPLACEMENT  left    HX OPEN REDUCTION INTERNAL FIXATION Left     patellar fracture    HX OTHER SURGICAL Right     shoulder surgery    REMV CATARACT EXTRACAP,INSERT LENS,COMP  [id number]    bilateral       Social History     Social History    Marital status:      Spouse name: N/A    Number of children: N/A    Years of education: N/A     Occupational History    Not on file. Social History Main Topics    Smoking status: Never Smoker    Smokeless tobacco: Never Used    Alcohol use No    Drug use: No    Sexual activity: No     Other Topics Concern    Not on file     Social History Narrative       Patient does not have an advanced directive on file    VS:   /56   P 50     Pulse Ox 96%  Pain: 2/10    Physical Exam   Neck: Carotid bruit is not present. Cardiovascular: Normal rate and regular rhythm. Exam reveals no gallop. No murmur heard. Pulmonary/Chest: She has no wheezes. She has no rales. Abdominal: Soft. Bowel sounds are normal. She exhibits no distension. There is no tenderness. Musculoskeletal: She exhibits no edema.        Hospital Outpatient Visit on 03/22/2017   Component Date Value Ref Range Status    WBC 03/22/2017 5.2  4.6 - 13.2 K/uL Final    RBC 03/22/2017 3.00* 4.20 - 5.30 M/uL Final    HGB 03/22/2017 11.5* 12.0 - 16.0 g/dL Final    HCT 03/22/2017 28.8* 35.0 - 45.0 % Final    MCV 03/22/2017 96.0  74.0 - 97.0 FL Final    MCH 03/22/2017 38.3* 24.0 - 34.0 PG Final    MCHC 03/22/2017 39.9* 31.0 - 37.0 g/dL Final    RDW 03/22/2017 14.2  11.6 - 14.5 % Final    PLATELET 36/80/1046 046  135 - 420 K/uL Final    MPV 03/22/2017 10.7  9.2 - 11.8 FL Final    NEUTROPHILS 03/22/2017 72  40 - 73 % Final    LYMPHOCYTES 03/22/2017 13* 21 - 52 % Final    MONOCYTES 03/22/2017 13* 3 - 10 % Final    EOSINOPHILS 03/22/2017 2  0 - 5 % Final    BASOPHILS 03/22/2017 0  0 - 2 % Final    ABS. NEUTROPHILS 03/22/2017 3.7  1.8 - 8.0 K/UL Final    ABS. LYMPHOCYTES 03/22/2017 0.7* 0.9 - 3.6 K/UL Final    ABS. MONOCYTES 03/22/2017 0.7  0.05 - 1.2 K/UL Final    ABS. EOSINOPHILS 03/22/2017 0.1  0.0 - 0.4 K/UL Final    ABS. BASOPHILS 03/22/2017 0.0  0.0 - 0.06 K/UL Final    DF 03/22/2017 AUTOMATED    Final    Triiodothyronine (T3), free 03/22/2017 2.6  2.3 - 4.2 PG/ML Final    T4, Free 03/22/2017 0.7  0.7 - 1.5 NG/DL Final    Sodium 03/22/2017 136  136 - 145 mmol/L Final    Potassium 03/22/2017 4.9  3.5 - 5.5 mmol/L Final    Chloride 03/22/2017 100  100 - 108 mmol/L Final    CO2 03/22/2017 29  21 - 32 mmol/L Final    Anion gap 03/22/2017 7  3.0 - 18 mmol/L Final    Glucose 03/22/2017 219* 74 - 99 mg/dL Final    BUN 03/22/2017 38* 7.0 - 18 MG/DL Final    Creatinine 03/22/2017 1.20  0.6 - 1.3 MG/DL Final    BUN/Creatinine ratio 03/22/2017 32* 12 - 20   Final    GFR est AA 03/22/2017 51* >60 ml/min/1.73m2 Final    GFR est non-AA 03/22/2017 42* >60 ml/min/1.73m2 Final    Comment: (NOTE)  Estimated GFR is calculated using the Modification of Diet in Renal   Disease (MDRD) Study equation, reported for both  Americans   (GFRAA) and non- Americans (GFRNA), and normalized to 1.73m2   body surface area. The physician must decide which value applies to   the patient. The MDRD study equation should only be used in   individuals age 25 or older.  It has not been validated for the   following: pregnant women, patients with serious comorbid conditions,   or on certain medications, or persons with extremes of body size,   muscle mass, or nutritional status.  Calcium 03/22/2017 9.4  8.5 - 10.1 MG/DL Final    Bilirubin, total 03/22/2017 0.5  0.2 - 1.0 MG/DL Final    ALT (SGPT) 03/22/2017 23  13 - 56 U/L Final    AST (SGOT) 03/22/2017 19  15 - 37 U/L Final    Alk. phosphatase 03/22/2017 50  45 - 117 U/L Final    Protein, total 03/22/2017 7.3  6.4 - 8.2 g/dL Final    Albumin 03/22/2017 3.5  3.4 - 5.0 g/dL Final    Globulin 03/22/2017 3.8  2.0 - 4.0 g/dL Final    A-G Ratio 03/22/2017 0.9  0.8 - 1.7   Final    TSH 03/22/2017 4.41* 0.36 - 3.74 uIU/mL Final    Hemoglobin A1c 03/22/2017 7.3* 4.2 - 5.6 % Final    Comment: (NOTE)  HbA1C Interpretive Ranges  <5.7              Normal  5.7 - 6.4         Consider Prediabetes  >6.5              Consider Diabetes      Est. average glucose 03/22/2017 163  mg/dL Final    Comment: (NOTE)  The eAG should be interpreted with patient characteristics in mind   since ethnicity, interindividual differences, red cell lifespan,   variation in rates of glycation, etc. may affect the validity of the   calculation. .No results found for any visits on 04/19/17. Assessment / Plan:      ICD-10-CM ICD-9-CM    1. Controlled type 2 diabetes mellitus without complication, with long-term current use of insulin ([facility]) E11.9 250.00     Z79.4 V58.67    2. Hx of subdural hematoma Z86.79 V12.59    3. Nocturia R35.1 788.43    4. Essential hypertension I10 401.9      Ditropan 5 mg at bedtime  she was advised to continue her maintenance medications      Medication Reconciliation:  Medications were reconciled with the facility medical administration record during this visit      Follow up:  3 months    I asked Aleshia Rodriguez if she has any questions and I answered the questions.   Aleshia Rodriguez states that she understands the treatment plan and agrees with the treatment plan      LOS:  [id number]

## 2017-05-02 ENCOUNTER — APPOINTMENT (OUTPATIENT)
Dept: GENERAL RADIOLOGY | Age: 82
DRG: 281 | End: 2017-05-02
Attending: EMERGENCY MEDICINE
Payer: MEDICARE

## 2017-05-02 ENCOUNTER — APPOINTMENT (OUTPATIENT)
Dept: CT IMAGING | Age: 82
DRG: 281 | End: 2017-05-02
Attending: EMERGENCY MEDICINE
Payer: MEDICARE

## 2017-05-02 ENCOUNTER — HOSPITAL ENCOUNTER (INPATIENT)
Age: 82
LOS: 5 days | Discharge: NURSING, REHAB OR LONG-TERM CARE | DRG: 281 | End: 2017-05-08
Attending: EMERGENCY MEDICINE | Admitting: INTERNAL MEDICINE
Payer: MEDICARE

## 2017-05-02 DIAGNOSIS — I21.19 ACUTE MI, INFERIOR WALL (HCC): Primary | ICD-10-CM

## 2017-05-02 LAB
ALBUMIN SERPL BCP-MCNC: 3.2 G/DL (ref 3.4–5)
ALBUMIN SERPL BCP-MCNC: 3.4 G/DL (ref 3.4–5)
ALBUMIN/GLOB SERPL: 0.8 {RATIO} (ref 0.8–1.7)
ALBUMIN/GLOB SERPL: 0.8 {RATIO} (ref 0.8–1.7)
ALP SERPL-CCNC: 46 U/L (ref 45–117)
ALP SERPL-CCNC: 52 U/L (ref 45–117)
ALT SERPL-CCNC: 173 U/L (ref 13–56)
ALT SERPL-CCNC: 52 U/L (ref 13–56)
ANION GAP BLD CALC-SCNC: 11 MMOL/L (ref 3–18)
APTT PPP: 30.3 SEC (ref 23–36.4)
APTT PPP: 47 SEC (ref 23–36.4)
APTT PPP: 92.4 SEC (ref 23–36.4)
APTT PPP: >180 SEC (ref 23–36.4)
AST SERPL W P-5'-P-CCNC: 171 U/L (ref 15–37)
AST SERPL W P-5'-P-CCNC: 46 U/L (ref 15–37)
ATRIAL RATE: 50 BPM
BASOPHILS # BLD AUTO: 0 K/UL (ref 0–0.1)
BASOPHILS # BLD: 0 % (ref 0–2)
BILIRUB DIRECT SERPL-MCNC: 0.2 MG/DL (ref 0–0.2)
BILIRUB SERPL-MCNC: 0.4 MG/DL (ref 0.2–1)
BILIRUB SERPL-MCNC: 0.6 MG/DL (ref 0.2–1)
BUN SERPL-MCNC: 41 MG/DL (ref 7–18)
BUN/CREAT SERPL: 30 (ref 12–20)
CALCIUM SERPL-MCNC: 10 MG/DL (ref 8.5–10.1)
CALCULATED P AXIS, ECG09: -98 DEGREES
CALCULATED R AXIS, ECG10: 97 DEGREES
CALCULATED T AXIS, ECG11: 103 DEGREES
CHLORIDE SERPL-SCNC: 99 MMOL/L (ref 100–108)
CK MB CFR SERPL CALC: ABNORMAL % (ref 0–4)
CK MB SERPL-MCNC: <1 NG/ML (ref 5–25)
CK SERPL-CCNC: 44 U/L (ref 26–192)
CO2 SERPL-SCNC: 24 MMOL/L (ref 21–32)
CREAT SERPL-MCNC: 1.36 MG/DL (ref 0.6–1.3)
DIAGNOSIS, 93000: NORMAL
DIFFERENTIAL METHOD BLD: ABNORMAL
EOSINOPHIL # BLD: 0.1 K/UL (ref 0–0.4)
EOSINOPHIL NFR BLD: 1 % (ref 0–5)
ERYTHROCYTE [DISTWIDTH] IN BLOOD BY AUTOMATED COUNT: 13.3 % (ref 11.6–14.5)
ERYTHROCYTE [DISTWIDTH] IN BLOOD BY AUTOMATED COUNT: 14.1 % (ref 11.6–14.5)
GLOBULIN SER CALC-MCNC: 4 G/DL (ref 2–4)
GLOBULIN SER CALC-MCNC: 4.3 G/DL (ref 2–4)
GLUCOSE SERPL-MCNC: 267 MG/DL (ref 74–99)
HCT VFR BLD AUTO: 33.7 % (ref 35–45)
HCT VFR BLD AUTO: 35.7 % (ref 35–45)
HGB BLD-MCNC: 11.2 G/DL (ref 12–16)
HGB BLD-MCNC: 12.6 G/DL (ref 12–16)
INR PPP: 1 (ref 0.8–1.2)
LYMPHOCYTES # BLD AUTO: 15 % (ref 21–52)
LYMPHOCYTES # BLD: 1.5 K/UL (ref 0.9–3.6)
MCH RBC QN AUTO: 31 PG (ref 24–34)
MCH RBC QN AUTO: 31.6 PG (ref 24–34)
MCHC RBC AUTO-ENTMCNC: 33.2 G/DL (ref 31–37)
MCHC RBC AUTO-ENTMCNC: 35.3 G/DL (ref 31–37)
MCV RBC AUTO: 89.5 FL (ref 74–97)
MCV RBC AUTO: 97.2 FL (ref 74–97)
MONOCYTES # BLD: 1 K/UL (ref 0.05–1.2)
MONOCYTES NFR BLD AUTO: 10 % (ref 3–10)
NEUTS SEG # BLD: 7.1 K/UL (ref 1.8–8)
NEUTS SEG NFR BLD AUTO: 74 % (ref 40–73)
P-R INTERVAL, ECG05: 112 MS
PLATELET # BLD AUTO: 245 K/UL (ref 135–420)
PLATELET # BLD AUTO: 251 K/UL (ref 135–420)
PMV BLD AUTO: 10.5 FL (ref 9.2–11.8)
PMV BLD AUTO: 11.1 FL (ref 9.2–11.8)
POTASSIUM SERPL-SCNC: 4.6 MMOL/L (ref 3.5–5.5)
PROT SERPL-MCNC: 7.2 G/DL (ref 6.4–8.2)
PROT SERPL-MCNC: 7.7 G/DL (ref 6.4–8.2)
PROTHROMBIN TIME: 13.2 SEC (ref 11.5–15.2)
Q-T INTERVAL, ECG07: 490 MS
QRS DURATION, ECG06: 128 MS
QTC CALCULATION (BEZET), ECG08: 446 MS
RBC # BLD AUTO: 2.82 M/UL (ref 4.2–5.3)
RBC # BLD AUTO: 3.99 M/UL (ref 4.2–5.3)
SODIUM SERPL-SCNC: 134 MMOL/L (ref 136–145)
TROPONIN I BLD-MCNC: 0.04 NG/ML (ref 0–0.08)
TROPONIN I SERPL-MCNC: 0.08 NG/ML (ref 0–0.04)
TROPONIN I SERPL-MCNC: 6.71 NG/ML (ref 0–0.04)
TSH SERPL DL<=0.05 MIU/L-ACNC: 10.7 UIU/ML (ref 0.36–3.74)
VENTRICULAR RATE, ECG03: 50 BPM
WBC # BLD AUTO: 8.4 K/UL (ref 4.6–13.2)
WBC # BLD AUTO: 9.7 K/UL (ref 4.6–13.2)

## 2017-05-02 PROCEDURE — 85730 THROMBOPLASTIN TIME PARTIAL: CPT | Performed by: EMERGENCY MEDICINE

## 2017-05-02 PROCEDURE — 96366 THER/PROPH/DIAG IV INF ADDON: CPT

## 2017-05-02 PROCEDURE — 93306 TTE W/DOPPLER COMPLETE: CPT

## 2017-05-02 PROCEDURE — 74011250637 HC RX REV CODE- 250/637: Performed by: INTERNAL MEDICINE

## 2017-05-02 PROCEDURE — 85610 PROTHROMBIN TIME: CPT | Performed by: EMERGENCY MEDICINE

## 2017-05-02 PROCEDURE — 80053 COMPREHEN METABOLIC PANEL: CPT | Performed by: EMERGENCY MEDICINE

## 2017-05-02 PROCEDURE — 74011250636 HC RX REV CODE- 250/636: Performed by: EMERGENCY MEDICINE

## 2017-05-02 PROCEDURE — 74011000250 HC RX REV CODE- 250: Performed by: EMERGENCY MEDICINE

## 2017-05-02 PROCEDURE — 71010 XR CHEST PORT: CPT

## 2017-05-02 PROCEDURE — 96365 THER/PROPH/DIAG IV INF INIT: CPT

## 2017-05-02 PROCEDURE — 80076 HEPATIC FUNCTION PANEL: CPT | Performed by: INTERNAL MEDICINE

## 2017-05-02 PROCEDURE — 74011250636 HC RX REV CODE- 250/636

## 2017-05-02 PROCEDURE — 82550 ASSAY OF CK (CPK): CPT | Performed by: EMERGENCY MEDICINE

## 2017-05-02 PROCEDURE — 84484 ASSAY OF TROPONIN QUANT: CPT | Performed by: EMERGENCY MEDICINE

## 2017-05-02 PROCEDURE — 74011250637 HC RX REV CODE- 250/637: Performed by: EMERGENCY MEDICINE

## 2017-05-02 PROCEDURE — 93005 ELECTROCARDIOGRAM TRACING: CPT

## 2017-05-02 PROCEDURE — 96375 TX/PRO/DX INJ NEW DRUG ADDON: CPT

## 2017-05-02 PROCEDURE — 84443 ASSAY THYROID STIM HORMONE: CPT | Performed by: INTERNAL MEDICINE

## 2017-05-02 PROCEDURE — 85730 THROMBOPLASTIN TIME PARTIAL: CPT | Performed by: INTERNAL MEDICINE

## 2017-05-02 PROCEDURE — 85027 COMPLETE CBC AUTOMATED: CPT | Performed by: INTERNAL MEDICINE

## 2017-05-02 PROCEDURE — 99285 EMERGENCY DEPT VISIT HI MDM: CPT

## 2017-05-02 PROCEDURE — 94762 N-INVAS EAR/PLS OXIMTRY CONT: CPT

## 2017-05-02 PROCEDURE — 85025 COMPLETE CBC W/AUTO DIFF WBC: CPT | Performed by: EMERGENCY MEDICINE

## 2017-05-02 RX ORDER — LANOLIN ALCOHOL/MO/W.PET/CERES
1000 CREAM (GRAM) TOPICAL DAILY
Status: DISCONTINUED | OUTPATIENT
Start: 2017-05-03 | End: 2017-05-08 | Stop reason: HOSPADM

## 2017-05-02 RX ORDER — ADHESIVE BANDAGE
30 BANDAGE TOPICAL DAILY PRN
Status: DISCONTINUED | OUTPATIENT
Start: 2017-05-02 | End: 2017-05-08 | Stop reason: HOSPADM

## 2017-05-02 RX ORDER — MORPHINE SULFATE 2 MG/ML
INJECTION, SOLUTION INTRAMUSCULAR; INTRAVENOUS
Status: COMPLETED
Start: 2017-05-02 | End: 2017-05-02

## 2017-05-02 RX ORDER — ONDANSETRON 2 MG/ML
INJECTION INTRAMUSCULAR; INTRAVENOUS
Status: COMPLETED
Start: 2017-05-02 | End: 2017-05-02

## 2017-05-02 RX ORDER — FENTANYL CITRATE 50 UG/ML
12.5-1 INJECTION, SOLUTION INTRAMUSCULAR; INTRAVENOUS
Status: DISCONTINUED | OUTPATIENT
Start: 2017-05-02 | End: 2017-05-02

## 2017-05-02 RX ORDER — GUAIFENESIN 100 MG/5ML
324 LIQUID (ML) ORAL
Status: COMPLETED | OUTPATIENT
Start: 2017-05-02 | End: 2017-05-02

## 2017-05-02 RX ORDER — NITROGLYCERIN 40 MG/100ML
10 INJECTION INTRAVENOUS
Status: DISCONTINUED | OUTPATIENT
Start: 2017-05-02 | End: 2017-05-07

## 2017-05-02 RX ORDER — NITROGLYCERIN 40 MG/100ML
INJECTION INTRAVENOUS
Status: DISPENSED
Start: 2017-05-02 | End: 2017-05-02

## 2017-05-02 RX ORDER — HEPARIN SODIUM 200 [USP'U]/100ML
500 INJECTION, SOLUTION INTRAVENOUS ONCE
Status: DISCONTINUED | OUTPATIENT
Start: 2017-05-02 | End: 2017-05-02

## 2017-05-02 RX ORDER — SODIUM CHLORIDE 0.9 % (FLUSH) 0.9 %
5-10 SYRINGE (ML) INJECTION AS NEEDED
Status: DISCONTINUED | OUTPATIENT
Start: 2017-05-02 | End: 2017-05-08 | Stop reason: HOSPADM

## 2017-05-02 RX ORDER — HEPARIN SODIUM 1000 [USP'U]/ML
60 INJECTION, SOLUTION INTRAVENOUS; SUBCUTANEOUS ONCE
Status: COMPLETED | OUTPATIENT
Start: 2017-05-02 | End: 2017-05-02

## 2017-05-02 RX ORDER — SIMVASTATIN 10 MG/1
10 TABLET, FILM COATED ORAL
Status: DISCONTINUED | OUTPATIENT
Start: 2017-05-02 | End: 2017-05-08 | Stop reason: HOSPADM

## 2017-05-02 RX ORDER — SODIUM CHLORIDE 0.9 % (FLUSH) 0.9 %
5-10 SYRINGE (ML) INJECTION EVERY 8 HOURS
Status: DISCONTINUED | OUTPATIENT
Start: 2017-05-02 | End: 2017-05-08

## 2017-05-02 RX ORDER — DOCUSATE SODIUM 100 MG/1
100 CAPSULE, LIQUID FILLED ORAL 2 TIMES DAILY
Status: DISCONTINUED | OUTPATIENT
Start: 2017-05-02 | End: 2017-05-08 | Stop reason: HOSPADM

## 2017-05-02 RX ORDER — HEPARIN SODIUM 10000 [USP'U]/100ML
12-25 INJECTION, SOLUTION INTRAVENOUS
Status: DISCONTINUED | OUTPATIENT
Start: 2017-05-02 | End: 2017-05-04

## 2017-05-02 RX ORDER — METHIMAZOLE 10 MG/1
5 TABLET ORAL DAILY
Status: DISCONTINUED | OUTPATIENT
Start: 2017-05-03 | End: 2017-05-08 | Stop reason: HOSPADM

## 2017-05-02 RX ORDER — BIVALIRUDIN 250 MG/5ML
0.75 INJECTION, POWDER, LYOPHILIZED, FOR SOLUTION INTRAVENOUS ONCE
Status: DISCONTINUED | OUTPATIENT
Start: 2017-05-02 | End: 2017-05-02

## 2017-05-02 RX ORDER — CLOPIDOGREL BISULFATE 75 MG/1
75 TABLET ORAL DAILY
Status: DISCONTINUED | OUTPATIENT
Start: 2017-05-03 | End: 2017-05-08 | Stop reason: HOSPADM

## 2017-05-02 RX ORDER — LISINOPRIL 10 MG/1
5 TABLET ORAL DAILY
Status: DISCONTINUED | OUTPATIENT
Start: 2017-05-03 | End: 2017-05-03

## 2017-05-02 RX ORDER — HEPARIN SODIUM 1000 [USP'U]/ML
1000-10000 INJECTION, SOLUTION INTRAVENOUS; SUBCUTANEOUS
Status: DISCONTINUED | OUTPATIENT
Start: 2017-05-02 | End: 2017-05-02

## 2017-05-02 RX ORDER — ACETAMINOPHEN 650 MG/1
650 SUPPOSITORY RECTAL
Status: DISCONTINUED | OUTPATIENT
Start: 2017-05-02 | End: 2017-05-05

## 2017-05-02 RX ORDER — LIDOCAINE HYDROCHLORIDE 10 MG/ML
1-30 INJECTION, SOLUTION EPIDURAL; INFILTRATION; INTRACAUDAL; PERINEURAL
Status: DISCONTINUED | OUTPATIENT
Start: 2017-05-02 | End: 2017-05-02

## 2017-05-02 RX ORDER — LANOLIN ALCOHOL/MO/W.PET/CERES
1 CREAM (GRAM) TOPICAL
Status: DISCONTINUED | OUTPATIENT
Start: 2017-05-03 | End: 2017-05-08 | Stop reason: HOSPADM

## 2017-05-02 RX ORDER — GUAIFENESIN 100 MG/5ML
81 LIQUID (ML) ORAL DAILY
Status: DISCONTINUED | OUTPATIENT
Start: 2017-05-03 | End: 2017-05-08 | Stop reason: HOSPADM

## 2017-05-02 RX ORDER — LOVASTATIN 20 MG/1
20 TABLET ORAL
Status: DISCONTINUED | OUTPATIENT
Start: 2017-05-02 | End: 2017-05-02 | Stop reason: CLARIF

## 2017-05-02 RX ORDER — AMLODIPINE BESYLATE 5 MG/1
2.5 TABLET ORAL DAILY
Status: DISCONTINUED | OUTPATIENT
Start: 2017-05-02 | End: 2017-05-03

## 2017-05-02 RX ORDER — LANOLIN ALCOHOL/MO/W.PET/CERES
3 CREAM (GRAM) TOPICAL
Status: DISCONTINUED | OUTPATIENT
Start: 2017-05-02 | End: 2017-05-08 | Stop reason: HOSPADM

## 2017-05-02 RX ORDER — HEPARIN SODIUM 5000 [USP'U]/ML
INJECTION, SOLUTION INTRAVENOUS; SUBCUTANEOUS
Status: DISPENSED
Start: 2017-05-02 | End: 2017-05-02

## 2017-05-02 RX ORDER — MIDAZOLAM HYDROCHLORIDE 1 MG/ML
.5-2 INJECTION, SOLUTION INTRAMUSCULAR; INTRAVENOUS
Status: DISCONTINUED | OUTPATIENT
Start: 2017-05-02 | End: 2017-05-02

## 2017-05-02 RX ADMIN — HEPARIN SODIUM AND DEXTROSE 12 UNITS/KG/HR: 10000; 5 INJECTION INTRAVENOUS at 04:51

## 2017-05-02 RX ADMIN — HEPARIN SODIUM 2670 UNITS: 1000 INJECTION, SOLUTION INTRAVENOUS; SUBCUTANEOUS at 04:08

## 2017-05-02 RX ADMIN — DOCUSATE SODIUM 100 MG: 100 CAPSULE, LIQUID FILLED ORAL at 19:01

## 2017-05-02 RX ADMIN — ONDANSETRON 4 MG: 2 INJECTION INTRAMUSCULAR; INTRAVENOUS at 05:07

## 2017-05-02 RX ADMIN — ASPIRIN 81 MG CHEWABLE TABLET 324 MG: 81 TABLET CHEWABLE at 03:51

## 2017-05-02 RX ADMIN — NITROGLYCERIN 10 MCG/MIN: 40 INJECTION INTRAVENOUS at 05:08

## 2017-05-02 RX ADMIN — Medication 2 MG: at 05:07

## 2017-05-02 NOTE — ED NOTES
Primary nurse called ECHO, echo stated that they would come to bedside ASAP, continue to monitor pt, pt stable heparin infusion stopped per protocol. pt still on cardiac monitor no complaints at this time

## 2017-05-02 NOTE — PROGRESS NOTES
[facility] Hospitalist Group  Progress Note    Patient: Jason Bernal Age: 80 y.o. : 1926 MR#: [id number] SSN: [id number]  Date/Time: 2017 1:12 PM    Subjective:     Seen getting echo. Appears comfortable. Says she still has a little bit of L side CP. No SOB, N/V, F/C. Assessment/Plan:   1. Admission orders signed and released for execution. 2. Inf STEMI - declined cardiac cath. Med mgmt. Heparin gtt, ASA, Plavix, statin. BBlocker on hold for low HRs. Echo done: EF 55-60%, no obvious wall motion abnml. Mild AStenosis. 3. HTN - BP slightly elevated, prior low HRs, holding BBlocker. Resume Norvasc 2.5mg/day. 4. BURT - likely prerenal. Following. 5. Hyperlipidemia - statin. 6. DM - SSI. Last HgbA1c 7.3% from 3/22. 7. DNR. DDNR scanned, in Media tab. Additional Notes:      Case discussed with:  [x]Patient  []Family  []Nursing  []Case Management  DVT Prophylaxis:  []Lovenox  []Hep SQ  []SCDs  []Coumadin   [x]On Heparin gtt    Objective:   VS:   Visit Vitals    /74    Pulse 63    Temp 97.3 °F (36.3 °C)    Resp 20    Wt 44.5 kg (98 lb)    SpO2 94%    BMI 17.92 kg/m2      Tmax/24hrs: Temp (24hrs), Av.3 °F (36.3 °C), Min:97.3 °F (36.3 °C), Max:97.3 °F (36.3 °C)  No intake or output data in the 24 hours ending 17 1312    General:  Awake, alert, NAD. Cardiovascular:  RRR. Pulmonary:  CTA B ant.   GI:  Soft, NT/ND, NABS. Extremities:  No CT or edema.    Additional:      Labs:    Recent Results (from the past 24 hour(s))   EKG, 12 LEAD, INITIAL    Collection Time: 17  3:37 AM   Result Value Ref Range    Ventricular Rate 50 BPM    Atrial Rate 50 BPM    P-R Interval 112 ms    QRS Duration 128 ms    Q-T Interval 490 ms    QTC Calculation (Bezet) 446 ms    Calculated P Axis -98 degrees    Calculated R Axis 97 degrees    Calculated T Axis 103 degrees    Diagnosis       Unusual P axis and short DE, probable junctional rhythm  Rightward axis  Nonspecific intraventricular block  ST elevation, consider inferior injury or acute infarct  ACUTE MI  Consider right ventricular involvement in acute inferior infarct  Abnormal ECG  No previous ECGs available  Confirmed by Abel Mena ([id number]) on 5/2/2017 11:09:53 AM     CBC WITH AUTOMATED DIFF    Collection Time: 05/02/17  3:42 AM   Result Value Ref Range    WBC 9.7 4.6 - 13.2 K/uL    RBC 3.99 (L) 4.20 - 5.30 M/uL    HGB 12.6 12.0 - 16.0 g/dL    HCT 35.7 35.0 - 45.0 %    MCV 89.5 74.0 - 97.0 FL    MCH 31.6 24.0 - 34.0 PG    MCHC 35.3 31.0 - 37.0 g/dL    RDW 13.3 11.6 - 14.5 %    PLATELET 728 575 - 366 K/uL    MPV 10.5 9.2 - 11.8 FL    NEUTROPHILS 74 (H) 40 - 73 %    LYMPHOCYTES 15 (L) 21 - 52 %    MONOCYTES 10 3 - 10 %    EOSINOPHILS 1 0 - 5 %    BASOPHILS 0 0 - 2 %    ABS. NEUTROPHILS 7.1 1.8 - 8.0 K/UL    ABS. LYMPHOCYTES 1.5 0.9 - 3.6 K/UL    ABS. MONOCYTES 1.0 0.05 - 1.2 K/UL    ABS. EOSINOPHILS 0.1 0.0 - 0.4 K/UL    ABS. BASOPHILS 0.0 0.0 - 0.1 K/UL    DF AUTOMATED     METABOLIC PANEL, COMPREHENSIVE    Collection Time: 05/02/17  3:42 AM   Result Value Ref Range    Sodium 134 (L) 136 - 145 mmol/L    Potassium 4.6 3.5 - 5.5 mmol/L    Chloride 99 (L) 100 - 108 mmol/L    CO2 24 21 - 32 mmol/L    Anion gap 11 3.0 - 18 mmol/L    Glucose 267 (H) 74 - 99 mg/dL    BUN 41 (H) 7.0 - 18 MG/DL    Creatinine 1.36 (H) 0.6 - 1.3 MG/DL    BUN/Creatinine ratio 30 (H) 12 - 20      GFR est AA 44 (L) >60 ml/min/1.73m2    GFR est non-AA 36 (L) >60 ml/min/1.73m2    Calcium 10.0 8.5 - 10.1 MG/DL    Bilirubin, total 0.4 0.2 - 1.0 MG/DL    ALT (SGPT) 52 13 - 56 U/L    AST (SGOT) 46 (H) 15 - 37 U/L    Alk.  phosphatase 46 45 - 117 U/L    Protein, total 7.7 6.4 - 8.2 g/dL    Albumin 3.4 3.4 - 5.0 g/dL    Globulin 4.3 (H) 2.0 - 4.0 g/dL    A-G Ratio 0.8 0.8 - 1.7     CARDIAC PANEL,(CK, CKMB & TROPONIN)    Collection Time: 05/02/17  3:42 AM   Result Value Ref Range    CK 44 26 - 192 U/L    CK - MB <1.0 <3.6 ng/ml    CK-MB Index Cannot be calulated 0.0 - 4.0 %    Troponin-I, Qt. 0.08 (H) 0.0 - 0.045 NG/ML   POC TROPONIN-I    Collection Time: 05/02/17  3:46 AM   Result Value Ref Range    Troponin-I (POC) 0.04 0.00 - 0.08 ng/mL   PTT    Collection Time: 05/02/17  3:47 AM   Result Value Ref Range    aPTT 30.3 23.0 - 36.4 SEC   PTT    Collection Time: 05/02/17 10:58 AM   Result Value Ref Range    aPTT >180.0 (HH) 23.0 - 36.4 SEC       Signed By: Holden Hunter MD     May 2, 2017 1:12 PM

## 2017-05-02 NOTE — Clinical Note
Status[de-identified] Inpatient [101] Type of Bed: Intensive Care [6] Inpatient Hospitalization Certified Necessary for the Following Reasons: 8. Other (further clarification in H&P documentation) Admitting Diagnosis: STEMI (ST elevation myocardial infarction) ([facility] [[id number]] Admitting Physician: Sharon Palomares Attending Physician: Sharon Palomares Estimated Length of Stay: 3-4 Midnights Discharge Plan[de-identified] 2003 [facility]

## 2017-05-02 NOTE — CONSULTS
[facility] - Consult Note    Consultation request by No admitting provider for patient encounter. for advice/opinion related to evaluating There are no admission diagnoses documented for this encounter. Date of  Admission: 5/2/2017  3:31 AM   Primary Care Physician:  Robert Vaca MD     Assessment:     - Acute inferior STEMI  - Intermittent junctional Bradycardia  -- BURT  - H/O HTN  - Recent history of traumatic subdural hematoma (08/16): requiring craniotomy and then again [facility] at [facility]  - Lethargy and drowsiness  - DM  - HLD  - Advance age and co-morbidities  - Renal mass  - BURT     Plan:     Came in with chest pain and Inferior STEMI  Intermittent junctional bradycardia ----> Currently sinus cesia on tele monitor    Discussed with patient in detail about her presentation, diagnosis of on going MI and heart attack and rhytym problem  Treatment option of medical management vs emergent LHC was discussed and patientstates she does not want invasive treatment including cath and PCI. Risk, benefit and alternatives of each stretagy discussed with patient and she still is sure that she does not want cath and PCI. Nurse at bedside also discussed similar options and confirms that she does not want cath  Risk, benefit, complication of LHC and possible PCI ( including but not limited to bleeding, infection, heart failure, stroke, MI, emergent bypass surgery, kidney failure, dialysis and death ) were discussed with patient. Risk of not having LHC and PCI was also discussed inclduing and not limited to on going MI, chest pain, arrythmia and heart failiure causing short or long term complications including death. She understands that and prefers medical management    - ASA now and then 81 mg daily  - IV NTG as tolerated by BP ( done )  - IV Heparin if ok by neurology. Recent last CT in 11/16 after treatment of SDH showed less of hematoma.  ED calling neurology team  - Avoid BB for now because of intermittent junctinoal rhythm  - Statin    This plas was discussed with ED physician. Total critical care time spent in reviewing the case, multiple EMR databases, physician notes, procedure notes, examining the patient, and documentation, is > 70 minutes. Tried to call son at available number in [facility]. Unable to reach at this time  Discussed in details with patient at bedside. All questions were answered to their full satisfaction. Risk, benefit and alternatives were discussed. >50% time spent in counseling and coordination of care. History of Present Illness: This is a 80 y.o. female admitted for There are no admission diagnoses documented for this encounter. .    Patient complains of:    Chest pain    80year old female with SDH in 08/16, HTN, HLD, DM  Brought in ED from ? ? Nursing home care facility after chest pain  Went to [facility] earlier in day and then had some chest pain on and off   EKG showed STEMI  Cardiology consult and STEMI code called. Patient states her pain is better compare to when she came in  She is tired and very lethargic. Appears to be oriented. She denies dizzness and SOB  No diaphoresis  Never had heart attack before. Cardiac risk factors: none      Review of Symptoms:  Except as stated above include:  Constitutional:  negative  Respiratory:  negative  Cardiovascular:  negative  Gastrointestinal: negative  Genitourinary:  negative  Musculoskeletal:  Negative  Neurological:  Negative  Dermatological:  Negative  Endocrinological: Negative  Psychological:  Negative    A comprehensive review of systems was negative except for that written in the HPI.      Past Medical History:     Past Medical History:   Diagnosis Date    Abnormality of gait     Acute upper respiratory infections of unspecified site     Broken shoulder     right    Chest pain     Chronic airway obstruction, not elsewhere classified     COPD     Diabetes ([facility]     Essential hypertension, benign     Hearing loss     History of tachycardia     possible SVT    Hypercholesterolemia     Hyperlipidemia     Hypertension     Incontinence     Mitral valve prolapse     MVP (mitral valve prolapse)     OAB (overactive bladder)     Osteoarthritis     spine    Osteoporosis     Pure hypercholesterolemia     Senile osteoporosis     Thyroid disease     Thyrotoxicosis     Type II or unspecified type diabetes mellitus without mention of complication, not stated as uncontrolled     Urinary tract infection, site not specified          Social History:     Social History     Social History    Marital status:      Spouse name: N/A    Number of children: N/A    Years of education: N/A     Social History Main Topics    Smoking status: Never Smoker    Smokeless tobacco: Never Used    Alcohol use No    Drug use: No    Sexual activity: No     Other Topics Concern    None     Social History Narrative        Family History:     Family History   Problem Relation Age of Onset    Diabetes Mother     Alcohol abuse Father    [facility] Arthritis-rheumatoid Sister     Diabetes Brother     Other Brother      eye problems    Hypertension Son     Diabetes Son     Cancer Son      Pancreatic cancer    Alcohol abuse Son         Medications: Allergies   Allergen Reactions    Nitrofurantoin Nausea and Vomiting        Current Facility-Administered Medications   Medication Dose Route Frequency    heparin 25,000 units in D5W 250 ml infusion  12-25 Units/kg/hr IntraVENous TITRATE    heparin (porcine) 5,000 unit/mL injection         Current Outpatient Prescriptions   Medication Sig    insulin aspart (NOVOLOG) 100 unit/mL injection Inject 8 units daily    oxybutynin chloride XL (DITROPAN XL) 10 mg CR tablet TAKE 1 TABLET BY MOUTH AT BEDTIME FOR BLADDER CONTGROL    amLODIPine (NORVASC) 2.5 mg tablet Take  by mouth daily.     insulin glargine (LANTUS) 100 unit/mL injection 5 Units by SubCUTAneous route daily (with breakfast).  nateglinide (STARLIX) 60 mg tablet Take 60 mg by mouth Before breakfast, lunch, and dinner.  ascorbic acid, vitamin C, (VITAMIN C) 250 mg tablet Take  by mouth.  acetaminophen (TYLENOL) 650 mg suppository Insert  into rectum every four (4) hours as needed for Fever.  metoprolol succinate (TOPROL XL) 50 mg XL tablet Take  by mouth daily.  insulin lispro (HUMALOG) 100 unit/mL injection by SubCUTAneous route Before breakfast, lunch, dinner and at bedtime. Indications: 2-10 units    insulin lispro (HUMALOG) 100 unit/mL injection by SubCUTAneous route daily. Indications: 8 units    ferrous sulfate (FEOSOL) 325 mg (65 mg iron) tablet Take  by mouth Daily (before breakfast).  polyethylene glycol (MIRALAX) 17 gram packet Take 17 g by mouth daily.  butalbital-acetaminophen-caffeine (FIORICET) -40 mg per tablet Take 1 Tab by mouth every four (4) hours as needed for Pain.  glucose blood VI test strips (ASCENSIA CONTOUR) strip USE TO TEST BLOOD SUGARS twice TIMES DAILY dx.e11.9    TRUEPLUS LANCETS 30 gauge misc Use to check blood glucose up to twice daily DX:e11.9    glimepiride (AMARYL) 2 mg tablet 1 tablet at breakfast.  1 tablet at supper    alendronate (FOSAMAX) 70 mg tablet TAKE 1 TABLET WEEKLY    lovastatin (MEVACOR) 20 mg tablet TAKE 1 TABLET DAILY    ASCENSIA CONTOUR strip USE TO TEST BLOOD SUGARS THREE TIMES DAILY    melatonin 3 mg tablet Take 3 mg by mouth nightly as needed.  methimazole (TAPAZOLE) 5 mg tablet Take 1 Tab by mouth daily.  cyanocobalamin (VITAMIN B-12) 1,000 mcg tablet Take 1,000 mcg by mouth daily.  omega-3 fatty acids-vitamin e (FISH OIL) 1,000 mg Cap Take 1 Cap by mouth two (2) times a day.          Physical Exam:     Visit Vitals    /60    Pulse (!) 40    Temp 97.3 °F (36.3 °C)    Resp 15    Wt 98 lb (44.5 kg)    SpO2 100%    BMI 17.92 kg/m2     BP Readings from Last 3 Encounters:   05/02/17 136/60   03/22/17 110/60 08/22/16 193/78     Pulse Readings from Last 3 Encounters:   05/02/17 (!) 40   03/22/17 60   08/22/16 79     Wt Readings from Last 3 Encounters:   05/02/17 98 lb (44.5 kg)   03/22/17 100 lb (45.4 kg)   07/21/16 89 lb (40.4 kg)       General:  alert, cooperative, no distress, appears stated age  Neck:  no JVD  Lungs:  clear to auscultation bilaterally  Heart:  regular rate and rhythm, S1, S2 normal, no murmur, click, rub or gallop  Abdomen:  abdomen is soft without significant tenderness, masses, organomegaly or guarding  Extremities:  extremities normal, atraumatic, no cyanosis or edema  Skin: Warm and dry.  no hyperpigmentation, vitiligo, or suspicious lesions  Neuro: alert, oriented x3, affect appropriate, no focal neurological deficits, moves all extremities well, no involuntary movements, reflexes at knee and ankle intact  Psych: non focal     Data Review:     Recent Labs      05/02/17   0342   WBC  9.7   HGB  12.6   HCT  35.7   PLT  251     Recent Labs      05/02/17   0342   NA  134*   K  4.6   CL  99*   CO2  24   GLU  267*   BUN  41*   CREA  1.36*   CA  10.0   ALB  3.4   SGOT  46*   ALT  52       Results for orders placed or performed during the hospital encounter of 08/22/16   EKG, 12 LEAD, INITIAL   Result Value Ref Range    Ventricular Rate 85 BPM    Atrial Rate 85 BPM    P-R Interval 162 ms    QRS Duration 66 ms    Q-T Interval 366 ms    QTC Calculation (Bezet) 435 ms    Calculated P Axis 65 degrees    Calculated R Axis 23 degrees    Calculated T Axis 62 degrees    Diagnosis       Normal sinus rhythm with sinus arrhythmia  Nonspecific ST abnormality  Abnormal ECG  When compared with ECG of 02-FEB-2015 10:07,  ST now depressed in Anterior leads  Confirmed by Tiburcio Hong ([id number]) on 8/22/2016 10:21:28 AM         All Cardiac Markers in the last 24 hours:    Lab Results   Component Value Date/Time    CPK 44 05/02/2017 03:42 AM    CKMB <1.0 05/02/2017 03:42 AM    CKND1 Cannot be calulated 05/02/2017 03:42 AM TROIQ 0.08 (H) 05/02/2017 03:42 AM    TNIPOC 0.04 05/02/2017 03:46 AM       Last Lipid:    Lab Results   Component Value Date/Time    Cholesterol, total 192 10/16/2015 05:11 PM    HDL Cholesterol 95 10/16/2015 05:11 PM    LDL, calculated 82 10/16/2015 05:11 PM    Triglyceride 74 10/16/2015 05:11 PM    CHOL/HDL Ratio 2.3 06/18/2010 08:29 AM       Signed By: Kirsten Toro MD     May 2, 2017

## 2017-05-02 NOTE — ED NOTES
Pt resting comfortably with eyes closed, warm blanket provided for comfort , call bell placed within patients reach and lights turned off per patients request.

## 2017-05-02 NOTE — ED NOTES
Assumed care of pt from breana travis , pt stable on cardiac monitor, pt denies CP respirations even and unlabored, pt has no complaints, resting comfortably, continue to monitor pt

## 2017-05-02 NOTE — ED NOTES
Bedside verbal shift change report given to [id number] (oncoming nurse) by Nicola House  RN (offgoing nurse). Patient is stable at time of shift change. Report given with SBAR and ED Summary.

## 2017-05-02 NOTE — ED PROVIDER NOTES
HPI Comments: 3:43 AM Isabela Mccollum is a 80 y.o. female with a h/o DM, HTN, COPD, and CP presents to the ED via EMS with c/o CP onset this morning. Hx is mildly limited due to pt being nonverbal. All other sx denied. No other complaints at this time. Alla Toth MD      The history is provided by the patient. Past Medical History:   Diagnosis Date    Abnormality of gait     Acute upper respiratory infections of unspecified site     Broken shoulder     right    Chest pain     Chronic airway obstruction, not elsewhere classified     COPD     Diabetes ([address]     Essential hypertension, benign     Hearing loss     History of tachycardia     possible SVT    Hypercholesterolemia     Hyperlipidemia     Hypertension     Incontinence     Mitral valve prolapse     MVP (mitral valve prolapse)     OAB (overactive bladder)     Osteoarthritis     spine    Osteoporosis     Pure hypercholesterolemia     Senile osteoporosis     Thyroid disease     Thyrotoxicosis     Type II or unspecified type diabetes mellitus without mention of complication, not stated as uncontrolled     Urinary tract infection, site not specified        Past Surgical History:   Procedure Laterality Date    HX KNEE REPLACEMENT  left    HX OPEN REDUCTION INTERNAL FIXATION Left     patellar fracture    HX OTHER SURGICAL Right     shoulder surgery    REMV CATARACT EXTRACAP,INSERT LENS,COMP  [id number]    bilateral         Family History:   Problem Relation Age of Onset    Diabetes Mother     Alcohol abuse Father     Arthritis-rheumatoid Sister     Diabetes Brother     Other Brother      eye problems    Hypertension Son     Diabetes Son     Cancer Son      Pancreatic cancer    Alcohol abuse Son        Social History     Social History    Marital status:      Spouse name: N/A    Number of children: N/A    Years of education: N/A     Occupational History    Not on file.      Social History Main Topics    Smoking status: Never Smoker    Smokeless tobacco: Never Used    Alcohol use No    Drug use: No    Sexual activity: No     Other Topics Concern    Not on file     Social History Narrative         ALLERGIES: Nitrofurantoin    Review of Systems   Unable to perform ROS: Patient nonverbal       There were no vitals filed for this visit. Physical Exam   Constitutional: She is oriented to person, place, and time. She appears well-developed and well-nourished. HENT:   Head: Normocephalic and atraumatic. Right Ear: External ear normal.   Left Ear: External ear normal.   Nose: Nose normal.   Mouth/Throat: Oropharynx is clear and moist.   Eyes: Conjunctivae and EOM are normal. Pupils are equal, round, and reactive to light. Neck: Normal range of motion. Neck supple. Cardiovascular: Regular rhythm, normal heart sounds and intact distal pulses. Pulmonary/Chest: Effort normal and breath sounds normal. No respiratory distress. She has no wheezes. She has no rales. She exhibits no tenderness. Abdominal: Soft. Bowel sounds are normal. She exhibits no distension and no mass. There is no tenderness. There is no rebound and no guarding. Musculoskeletal: Normal range of motion. She exhibits no edema. Neurological: She is alert and oriented to person, place, and time. Skin: Skin is warm and dry. No rash noted. No erythema. Psychiatric: She has a normal mood and affect. Her behavior is normal. Judgment normal.   Nursing note and vitals reviewed. [facility]  ED Course       Procedures  Vitals:  No data found. Medications ordered:   Medications - No data to display      Lab findings:  No results found for this or any previous visit (from the past 12 hour(s)). EKG interpretation by ED Physician:      X-Ray, CT or other radiology findings or impressions:  XR CHEST PORT    (Results Pending)       Progress notes, Consult notes or additional Procedure notes:   3:54 AM  Pt had ST elevation.  Discussed case with Dr. Presley Burt. Reviewed the pts chart. 3:59 AM  Calling a family member. 4:00 AM   Called 2 numbers listed on pts chart with no answer. Will treat symptomatically. 4:30AM  Discussed case with Dr. Elayne Schafer who will come and see the pt.     5:04 AM  Pt is DNR. Reevaluation of patient:       Disposition:  Diagnosis: No diagnosis found. Disposition: admit    Follow-up Information     None            Patient's Medications   Start Taking    No medications on file   Continue Taking    ACETAMINOPHEN (TYLENOL) 650 MG SUPPOSITORY    Insert  into rectum every four (4) hours as needed for Fever. ALENDRONATE (FOSAMAX) 70 MG TABLET    TAKE 1 TABLET WEEKLY    AMLODIPINE (NORVASC) 2.5 MG TABLET    Take  by mouth daily. ASCENSIA CONTOUR STRIP    USE TO TEST BLOOD SUGARS THREE TIMES DAILY    ASCORBIC ACID, VITAMIN C, (VITAMIN C) 250 MG TABLET    Take  by mouth. BUTALBITAL-ACETAMINOPHEN-CAFFEINE (FIORICET) -40 MG PER TABLET    Take 1 Tab by mouth every four (4) hours as needed for Pain. CYANOCOBALAMIN (VITAMIN B-12) 1,000 MCG TABLET    Take 1,000 mcg by mouth daily. FERROUS SULFATE (FEOSOL) 325 MG (65 MG IRON) TABLET    Take  by mouth Daily (before breakfast). GLIMEPIRIDE (AMARYL) 2 MG TABLET    1 tablet at breakfast.  1 tablet at supper    GLUCOSE BLOOD VI TEST STRIPS (ASCENSIA CONTOUR) STRIP    USE TO TEST BLOOD SUGARS twice TIMES DAILY dx.e11.9    INSULIN ASPART (NOVOLOG) 100 UNIT/ML INJECTION    Inject 8 units daily    INSULIN GLARGINE (LANTUS) 100 UNIT/ML INJECTION    5 Units by SubCUTAneous route daily (with breakfast). INSULIN LISPRO (HUMALOG) 100 UNIT/ML INJECTION    by SubCUTAneous route Before breakfast, lunch, dinner and at bedtime. Indications: 2-10 units    INSULIN LISPRO (HUMALOG) 100 UNIT/ML INJECTION    by SubCUTAneous route daily.  Indications: 8 units    LOVASTATIN (MEVACOR) 20 MG TABLET    TAKE 1 TABLET DAILY    MELATONIN 3 MG TABLET    Take 3 mg by mouth nightly as needed. METHIMAZOLE (TAPAZOLE) 5 MG TABLET    Take 1 Tab by mouth daily. METOPROLOL SUCCINATE (TOPROL XL) 50 MG XL TABLET    Take  by mouth daily. NATEGLINIDE (STARLIX) 60 MG TABLET    Take 60 mg by mouth Before breakfast, lunch, and dinner. OMEGA-3 FATTY ACIDS-VITAMIN E (FISH OIL) 1,000 MG CAP    Take 1 Cap by mouth two (2) times a day. OXYBUTYNIN CHLORIDE XL (DITROPAN XL) 10 MG CR TABLET    TAKE 1 TABLET BY MOUTH AT BEDTIME FOR BLADDER CONTGROL    POLYETHYLENE GLYCOL (MIRALAX) 17 GRAM PACKET    Take 17 g by mouth daily. TRUEPLUS LANCETS 30 GAUGE MISC    Use to check blood glucose up to twice daily DX:e11.9   These Medications have changed    No medications on file   Stop Taking    No medications on file     Scribe [id number] Dona Mayer for and in the presence of Kamila Lawrence MD 3:45 AM, 05/02/17. Physician Attestation  I personally performed the services described in the documentation, reviewed the documentation, as recorded by the scribe in my presence, and it accurately and completely records my words and actions.     Kamila Lawrence MD 3:45 AM 05/02/17        Signed by : Qasim Brown, 05/02/17 at 3:45 AM

## 2017-05-02 NOTE — ED TRIAGE NOTES
Patient arrived via medic complaining of chest pain, patient is currently dry heaving. She denies any SOB , she is alert and oriented at this time. Patient arrived from [address] [place] [address].

## 2017-05-02 NOTE — ED NOTES
Dr. Berny Cloud at patients bedside, assessing patient and explaining option of heart catheterization, risks and benefits, patient is alert and oriented x4. And she refuses the heart catheterization, she states that she understands the risks and benefits.

## 2017-05-02 NOTE — H&P
History and Physical      NAME:  Jason Bernal   :   1926   MRN:   [id number]     Date/Time:  2017     CHIEF COMPLAINT: chest pain     HISTORY OF PRESENT ILLNESS:     Ms. Ra Romano is a 80 y.o.   female with a PMH of HTN, COPD, Hyperlipidemia, DM-2,  who presents with c/c of  Chest pain. Patient was brought from a nursing facility for chest pain. Her pain is on left anterior chest,. Denies palpitation, dizziness or diaphoresis. She is poor historian. Here in ED EKG showed STEMI. Cardiology consulted and STEMI code called. She is found to have Inferior STEMI and Intermittent junctional bradycardia. Dr Pop Sanders cardiology seen her in ED he discussed with patient treatment option of medical management vs emergent LHC was discussed and patient states she does not want invasive treatment including cath and PCI. Recommended to continue medical management. She is started on IV nitroglycerin. She had h/o SDH on 2016 and ED physician consulted tele neurology and ok with IV heparin.  Currently  she is chest pain free.         Past Medical History:   Diagnosis Date    Abnormality of gait     Acute upper respiratory infections of unspecified site     Broken shoulder     right    Chest pain     Chronic airway obstruction, not elsewhere classified     COPD     Diabetes ([address]     Essential hypertension, benign     Hearing loss     History of tachycardia     possible SVT    Hypercholesterolemia     Hyperlipidemia     Hypertension     Incontinence     Mitral valve prolapse     MVP (mitral valve prolapse)     OAB (overactive bladder)     Osteoarthritis     spine    Osteoporosis     Pure hypercholesterolemia     Senile osteoporosis     Thyroid disease     Thyrotoxicosis     Type II or unspecified type diabetes mellitus without mention of complication, not stated as uncontrolled     Urinary tract infection, site not specified         Past Surgical History:   Procedure Laterality Date    HX KNEE REPLACEMENT  left    HX OPEN REDUCTION INTERNAL FIXATION Left     patellar fracture    HX OTHER SURGICAL Right     shoulder surgery    REMV CATARACT EXTRACAP,INSERT LENS,COMP  [id number]    bilateral       Social History   Substance Use Topics    Smoking status: Never Smoker    Smokeless tobacco: Never Used    Alcohol use No        Family History   Problem Relation Age of Onset    Diabetes Mother     Alcohol abuse Father    Ethan Muñoz Arthritis-rheumatoid Sister     Diabetes Brother     Other Brother      eye problems    Hypertension Son     Diabetes Son     Cancer Son      Pancreatic cancer    Alcohol abuse Son         Allergies   Allergen Reactions    Nitrofurantoin Nausea and Vomiting        Prior to Admission medications    Medication Sig Start Date End Date Taking? Authorizing Provider   insulin aspart (NOVOLOG) 100 unit/mL injection Inject 8 units daily 3/16/17   Robert Vaca MD   oxybutynin chloride XL (DITROPAN XL) 10 mg CR tablet TAKE 1 TABLET BY MOUTH AT BEDTIME FOR BLADDER CONTGROL 1/9/17   Robert Vaca MD   amLODIPine (NORVASC) 2.5 mg tablet Take  by mouth daily. Historical Provider   insulin glargine (LANTUS) 100 unit/mL injection 5 Units by SubCUTAneous route daily (with breakfast). Historical Provider   nateglinide (STARLIX) 60 mg tablet Take 60 mg by mouth Before breakfast, lunch, and dinner. Historical Provider   ascorbic acid, vitamin C, (VITAMIN C) 250 mg tablet Take  by mouth. Historical Provider   acetaminophen (TYLENOL) 650 mg suppository Insert  into rectum every four (4) hours as needed for Fever. Historical Provider   metoprolol succinate (TOPROL XL) 50 mg XL tablet Take  by mouth daily. Historical Provider   insulin lispro (HUMALOG) 100 unit/mL injection by SubCUTAneous route Before breakfast, lunch, dinner and at bedtime.  Indications: 2-10 units    Historical Provider   insulin lispro (HUMALOG) 100 unit/mL injection by SubCUTAneous route daily. Indications: 8 units    Historical Provider   ferrous sulfate (FEOSOL) 325 mg (65 mg iron) tablet Take  by mouth Daily (before breakfast). Historical Provider   polyethylene glycol (MIRALAX) 17 gram packet Take 17 g by mouth daily. Historical Provider   butalbital-acetaminophen-caffeine (FIORICET) -40 mg per tablet Take 1 Tab by mouth every four (4) hours as needed for Pain. Historical Provider   glucose blood VI test strips (ASCENSIA CONTOUR) strip USE TO TEST BLOOD SUGARS twice TIMES DAILY dx.e11.9 7/26/16   Rubina Millan MD   TRUEPLUS LANCETS 30 gauge misc Use to check blood glucose up to twice daily DX:e11.9 7/26/16   Rubina Millan MD   glimepiride (AMARYL) 2 mg tablet 1 tablet at breakfast.  1 tablet at supper 7/21/16   Rubina Millan MD   alendronate (FOSAMAX) 70 mg tablet TAKE 1 TABLET WEEKLY 7/18/16   Rubina Millan MD   lovastatin (MEVACOR) 20 mg tablet TAKE 1 TABLET DAILY 3/29/16   Rubina Millan MD   ASCENSIA CONTOUR strip USE TO TEST BLOOD SUGARS THREE TIMES DAILY 3/14/16   Rubina Millan MD   melatonin 3 mg tablet Take 3 mg by mouth nightly as needed. Historical Provider   methimazole (TAPAZOLE) 5 mg tablet Take 1 Tab by mouth daily. 10/20/15   Rubina Millan MD   cyanocobalamin (VITAMIN B-12) 1,000 mcg tablet Take 1,000 mcg by mouth daily. Historical Provider   omega-3 fatty acids-vitamin e (FISH OIL) 1,000 mg Cap Take 1 Cap by mouth two (2) times a day.  6/17/10   Historical Provider       REVIEW OF SYSTEMS:     CONSTITUTIONAL: No Fever, No chills, No weight loss, No Night sweats  HEENT:  No epistaxis, No diff in swallowing  CVS: No syncope, No peripheral edema, No PND, No orthopnea  RS: No shortness of breath, No cough, No hemoptysis, No pleuritic chest pain  GI: No abd pain, No vomitting, No diarrhea, No hematemesis, No rectal bleeding, No acid reflux or heartburn  NEURO: No focal weakness, No headaches, No seizures  PSYCH: No anxiety, No depression  MUSCULOSKLETAL: No joint pain or swelling  : No hematuria or dysuria  SKIN: No rash      Physical Exam:    VITALS:    Vital signs reviewed; most recent are:    Visit Vitals    /76    Pulse (!) 58    Temp 97.3 °F (36.3 °C)    Resp 21    Wt 44.5 kg (98 lb)    SpO2 98%    BMI 17.92 kg/m2     SpO2 Readings from Last 6 Encounters:   05/02/17 98%   08/22/16 99%   07/21/16 98%   07/19/16 99%   04/13/16 100%   01/19/16 96%        No intake or output data in the 24 hours ending 05/02/17 0719       GENERAL: Not in acute distress  HEENT: pink conjunctiva, un icteric sclera,   NECK: No lymphadenopthy or thyroid swelling, JVD not seen  LYMPH: No supraclavicular or cervical or axillary nodes on both sides  CVS: S1S2, No murmurs, No gallop or rub  RS: CTA, No wheezing or crackles  Abd: Soft, non tender, not distended, No guarding, No rigidity  NEURO:  No focal neurologic deficits   Extrm: no leg edema or swelling   Skin: No rash      Labs:  Recent Results (from the past 24 hour(s))   EKG, 12 LEAD, INITIAL    Collection Time: 05/02/17  3:37 AM   Result Value Ref Range    Ventricular Rate 50 BPM    Atrial Rate 50 BPM    P-R Interval 112 ms    QRS Duration 128 ms    Q-T Interval 490 ms    QTC Calculation (Bezet) 446 ms    Calculated P Axis -98 degrees    Calculated R Axis 97 degrees    Calculated T Axis 103 degrees    Diagnosis       Unusual P axis and short AL, probable junctional rhythm  Rightward axis  Nonspecific intraventricular block  ST elevation, consider inferior injury or acute infarct  ACUTE MI  Consider right ventricular involvement in acute inferior infarct  Abnormal ECG  No previous ECGs available     CBC WITH AUTOMATED DIFF    Collection Time: 05/02/17  3:42 AM   Result Value Ref Range    WBC 9.7 4.6 - 13.2 K/uL    RBC 3.99 (L) 4.20 - 5.30 M/uL    HGB 12.6 12.0 - 16.0 g/dL    HCT 35.7 35.0 - 45.0 %    MCV 89.5 74.0 - 97.0 FL    MCH 31.6 24.0 - 34.0 PG    MCHC 35.3 31.0 - 37.0 g/dL RDW 13.3 11.6 - 14.5 %    PLATELET 375 319 - 202 K/uL    MPV 10.5 9.2 - 11.8 FL    NEUTROPHILS 74 (H) 40 - 73 %    LYMPHOCYTES 15 (L) 21 - 52 %    MONOCYTES 10 3 - 10 %    EOSINOPHILS 1 0 - 5 %    BASOPHILS 0 0 - 2 %    ABS. NEUTROPHILS 7.1 1.8 - 8.0 K/UL    ABS. LYMPHOCYTES 1.5 0.9 - 3.6 K/UL    ABS. MONOCYTES 1.0 0.05 - 1.2 K/UL    ABS. EOSINOPHILS 0.1 0.0 - 0.4 K/UL    ABS. BASOPHILS 0.0 0.0 - 0.1 K/UL    DF AUTOMATED     METABOLIC PANEL, COMPREHENSIVE    Collection Time: 05/02/17  3:42 AM   Result Value Ref Range    Sodium 134 (L) 136 - 145 mmol/L    Potassium 4.6 3.5 - 5.5 mmol/L    Chloride 99 (L) 100 - 108 mmol/L    CO2 24 21 - 32 mmol/L    Anion gap 11 3.0 - 18 mmol/L    Glucose 267 (H) 74 - 99 mg/dL    BUN 41 (H) 7.0 - 18 MG/DL    Creatinine 1.36 (H) 0.6 - 1.3 MG/DL    BUN/Creatinine ratio 30 (H) 12 - 20      GFR est AA 44 (L) >60 ml/min/1.73m2    GFR est non-AA 36 (L) >60 ml/min/1.73m2    Calcium 10.0 8.5 - 10.1 MG/DL    Bilirubin, total 0.4 0.2 - 1.0 MG/DL    ALT (SGPT) 52 13 - 56 U/L    AST (SGOT) 46 (H) 15 - 37 U/L    Alk. phosphatase 46 45 - 117 U/L    Protein, total 7.7 6.4 - 8.2 g/dL    Albumin 3.4 3.4 - 5.0 g/dL    Globulin 4.3 (H) 2.0 - 4.0 g/dL    A-G Ratio 0.8 0.8 - 1.7     CARDIAC PANEL,(CK, CKMB & TROPONIN)    Collection Time: 05/02/17  3:42 AM   Result Value Ref Range    CK 44 26 - 192 U/L    CK - MB <1.0 <3.6 ng/ml    CK-MB Index Cannot be calulated 0.0 - 4.0 %    Troponin-I, Qt. 0.08 (H) 0.0 - 0.045 NG/ML   POC TROPONIN-I    Collection Time: 05/02/17  3:46 AM   Result Value Ref Range    Troponin-I (POC) 0.04 0.00 - 0.08 ng/mL   PTT    Collection Time: 05/02/17  3:47 AM   Result Value Ref Range    aPTT 30.3 23.0 - 36.4 SEC         Active Problems:    * No active hospital problems. *      Assessment:       1. Inferior STEMI  2. HTN, controlled  3. COPD,   4. BURT, probably prerenal  5. Hyperlipidemia,   6.  DM-2, uncontrolled    Plan:       · Cardiology has seen her in ED and discussed treatment options. She defered cardiac cath and preferred medical management. · Continue IV heparin drip  · IV nitroglycerine  · Continue ASA, statin  · Hold off BB for now due to bradycardia  · Echo to assess LV function  · DNR code status        Total time:  68 minutes             _______________________________________________________________________        Attending Physician:  Cassy Smith MD

## 2017-05-02 NOTE — ED NOTES
Echo at bedside, cardiology notified of critical PTT and the need to read the STAT ECHO, Roxy (PA) stated to call when almost done so she can be at bedside, dr Jaci Kim in room with patient at this time, continue to monitor pt

## 2017-05-02 NOTE — ED NOTES
Pt on nitro drip can not be transported to ECHO, and ECHO tech stated that they could not get the echo done until tomorrow 5/3/2017

## 2017-05-02 NOTE — PROGRESS NOTES
[facility] - Progress Note  Admit Date: 5/2/2017    Assessment:     Hospital Problems  Date Reviewed: 7/23/2016    None          - Acute inferior STEMI, patient has opted for conservative medical management only. EF 55-60%. - Intermittent junctional Bradycardia, patient wishes for conservative therapy. - BURT  - H/O HTN  - Recent history of traumatic subdural hematoma (08/16): requiring craniotomy and then again Vic holes at [facility]  - Lethargy and drowsiness  - DM  - HLD  - Advance age and co-morbidities  - Renal mass  - BURT  - Mild AS  - H/o subdural hematoma  - Constipation    Previously followed by Dr. Sury Leyva:     Patient wishes for conservative medical management only. Will continue hep and nitro drip. Continued on ASA, plavix. No BB given bradycardia. Will continue to follow closely on telemetry. Follow serial biomarkers and ECGs. Hold ace given renal insult. Would recommend palliative care consult to address goals of care. Subjective:     No further CP.      Objective:      Patient Vitals for the past 8 hrs:   Pulse Resp BP SpO2   05/02/17 1045 63 20 141/74 94 %   05/02/17 1030 63 14 146/76 95 %   05/02/17 1015 62 14 147/78 96 %   05/02/17 1000 64 29 142/74 93 %   05/02/17 0945 61 13 136/74 96 %   05/02/17 0930 61 19 133/77 96 %   05/02/17 0915 (!) 49 22 125/68 92 %   05/02/17 0900 61 21 144/75 93 %   05/02/17 0845 (!) 52 19 130/54 95 %   05/02/17 0830 (!) 59 18 122/66 95 %   05/02/17 0815 66 19 140/77 94 %   05/02/17 0800 67 27 124/78 94 %   05/02/17 0745 62 17 134/64 95 %   05/02/17 0730 61 19 138/73 94 %   05/02/17 0715 61 20 147/80 98 %   05/02/17 0615 (!) 58 21 133/76 98 %         Patient Vitals for the past 96 hrs:   Weight   05/02/17 0336 44.5 kg (98 lb)                  No intake or output data in the 24 hours ending 05/02/17 1359    Physical Exam:  General:  alert, cooperative, no distress, appears stated age  Neck:  no JVD  Lungs:  clear to auscultation bilaterally  Heart:  regular rhythm 2/6 systolic murmur base.   Abdomen:  abdomen is soft without significant tenderness, masses, organomegaly or guarding  Extremities:  extremities normal, atraumatic, no cyanosis or edema    Data Review:     Labs: Results:       Chemistry Recent Labs      05/02/17   0342   GLU  267*   NA  134*   K  4.6   CL  99*   CO2  24   BUN  41*   CREA  1.36*   CA  10.0   AGAP  11   BUCR  30*   AP  46   TP  7.7   ALB  3.4   GLOB  4.3*   AGRAT  0.8      CBC w/Diff Recent Labs      05/02/17   0342   WBC  9.7   RBC  3.99*   HGB  12.6   HCT  35.7   PLT  251   GRANS  74*   LYMPH  15*   EOS  1      Cardiac Enzymes Lab Results   Component Value Date/Time    CPK 44 05/02/2017 03:42 AM    CKMB <1.0 05/02/2017 03:42 AM    CKND1 Cannot be calulated 05/02/2017 03:42 AM    TROIQ 0.08 (H) 05/02/2017 03:42 AM    TNIPOC 0.04 05/02/2017 03:46 AM      Coagulation Recent Labs      05/02/17   1058  05/02/17 0347   APTT  >180.0*  30.3       Lipid Panel Lab Results   Component Value Date/Time    Cholesterol, total 192 10/16/2015 05:11 PM    HDL Cholesterol 95 10/16/2015 05:11 PM    LDL, calculated 82 10/16/2015 05:11 PM    VLDL, calculated 15 10/16/2015 05:11 PM    Triglyceride 74 10/16/2015 05:11 PM    CHOL/HDL Ratio 2.3 06/18/2010 08:29 AM      BNP No results found for: BNP, BNPP, XBNPT   Liver Enzymes Recent Labs      05/02/17   0342   TP  7.7   ALB  3.4   AP  46   SGOT  46*      Digoxin    Thyroid Studies Lab Results   Component Value Date/Time    TSH 4.41 03/22/2017 12:58 PM          Signed By: BÁRBARA Cisneros     May 2, 2017

## 2017-05-03 ENCOUNTER — APPOINTMENT (OUTPATIENT)
Dept: GENERAL RADIOLOGY | Age: 82
DRG: 281 | End: 2017-05-03
Attending: FAMILY MEDICINE
Payer: MEDICARE

## 2017-05-03 PROBLEM — I21.3 STEMI (ST ELEVATION MYOCARDIAL INFARCTION) (HCC): Status: ACTIVE | Noted: 2017-05-03

## 2017-05-03 LAB
ANION GAP BLD CALC-SCNC: 10 MMOL/L (ref 3–18)
APTT PPP: 140.5 SEC (ref 23–36.4)
APTT PPP: 62.9 SEC (ref 23–36.4)
APTT PPP: >180 SEC (ref 23–36.4)
ATRIAL RATE: 78 BPM
BASOPHILS # BLD AUTO: 0 K/UL (ref 0–0.06)
BASOPHILS # BLD: 0 % (ref 0–3)
BUN SERPL-MCNC: 40 MG/DL (ref 7–18)
BUN/CREAT SERPL: 28 (ref 12–20)
CALCIUM SERPL-MCNC: 9.6 MG/DL (ref 8.5–10.1)
CALCULATED R AXIS, ECG10: 9 DEGREES
CALCULATED T AXIS, ECG11: 95 DEGREES
CHLORIDE SERPL-SCNC: 99 MMOL/L (ref 100–108)
CHOLEST SERPL-MCNC: 193 MG/DL
CK MB CFR SERPL CALC: 8.2 % (ref 0–4)
CK MB SERPL-MCNC: 208 NG/ML (ref 5–25)
CK SERPL-CCNC: 2531 U/L (ref 26–192)
CO2 SERPL-SCNC: 26 MMOL/L (ref 21–32)
CREAT SERPL-MCNC: 1.41 MG/DL (ref 0.6–1.3)
DIAGNOSIS, 93000: NORMAL
DIFFERENTIAL METHOD BLD: ABNORMAL
EOSINOPHIL # BLD: 0 K/UL (ref 0–0.4)
EOSINOPHIL NFR BLD: 0 % (ref 0–5)
ERYTHROCYTE [DISTWIDTH] IN BLOOD BY AUTOMATED COUNT: 13.6 % (ref 11.6–14.5)
GLUCOSE SERPL-MCNC: 347 MG/DL (ref 74–99)
HCT VFR BLD AUTO: 30.3 % (ref 35–45)
HDLC SERPL-MCNC: 93 MG/DL (ref 40–60)
HDLC SERPL: 2.1 {RATIO} (ref 0–5)
HGB BLD-MCNC: 11.1 G/DL (ref 12–16)
LDLC SERPL CALC-MCNC: 89.2 MG/DL (ref 0–100)
LIPID PROFILE,FLP: ABNORMAL
LYMPHOCYTES # BLD AUTO: 7 % (ref 20–51)
LYMPHOCYTES # BLD: 0.8 K/UL (ref 0.8–3.5)
MCH RBC QN AUTO: 33.7 PG (ref 24–34)
MCHC RBC AUTO-ENTMCNC: 36.6 G/DL (ref 31–37)
MCV RBC AUTO: 92.1 FL (ref 74–97)
MONOCYTES # BLD: 0.5 K/UL (ref 0–1)
MONOCYTES NFR BLD AUTO: 4 % (ref 2–9)
NEUTS BAND NFR BLD MANUAL: 5 % (ref 0–5)
NEUTS SEG # BLD: 10.1 K/UL (ref 1.8–8)
NEUTS SEG NFR BLD AUTO: 84 % (ref 42–75)
PLATELET # BLD AUTO: 229 K/UL (ref 135–420)
PLATELET COMMENTS,PCOM: ABNORMAL
PMV BLD AUTO: 10.4 FL (ref 9.2–11.8)
POTASSIUM SERPL-SCNC: 4.3 MMOL/L (ref 3.5–5.5)
Q-T INTERVAL, ECG07: 396 MS
QRS DURATION, ECG06: 76 MS
QTC CALCULATION (BEZET), ECG08: 411 MS
RBC # BLD AUTO: 3.29 M/UL (ref 4.2–5.3)
RBC MORPH BLD: ABNORMAL
SODIUM SERPL-SCNC: 135 MMOL/L (ref 136–145)
TRIGL SERPL-MCNC: 54 MG/DL (ref ?–150)
TROPONIN I SERPL-MCNC: 83.7 NG/ML (ref 0–0.04)
VENTRICULAR RATE, ECG03: 65 BPM
VLDLC SERPL CALC-MCNC: 10.8 MG/DL
WBC # BLD AUTO: 11.4 K/UL (ref 4.6–13.2)

## 2017-05-03 PROCEDURE — 74011250637 HC RX REV CODE- 250/637: Performed by: PHYSICIAN ASSISTANT

## 2017-05-03 PROCEDURE — 36415 COLL VENOUS BLD VENIPUNCTURE: CPT | Performed by: INTERNAL MEDICINE

## 2017-05-03 PROCEDURE — 76450000000

## 2017-05-03 PROCEDURE — 65660000000 HC RM CCU STEPDOWN

## 2017-05-03 PROCEDURE — 74011250636 HC RX REV CODE- 250/636: Performed by: INTERNAL MEDICINE

## 2017-05-03 PROCEDURE — 74011250636 HC RX REV CODE- 250/636: Performed by: FAMILY MEDICINE

## 2017-05-03 PROCEDURE — 74011250637 HC RX REV CODE- 250/637: Performed by: INTERNAL MEDICINE

## 2017-05-03 PROCEDURE — 74011000250 HC RX REV CODE- 250: Performed by: EMERGENCY MEDICINE

## 2017-05-03 PROCEDURE — 85730 THROMBOPLASTIN TIME PARTIAL: CPT | Performed by: INTERNAL MEDICINE

## 2017-05-03 PROCEDURE — 80048 BASIC METABOLIC PNL TOTAL CA: CPT | Performed by: INTERNAL MEDICINE

## 2017-05-03 PROCEDURE — 74011250636 HC RX REV CODE- 250/636: Performed by: EMERGENCY MEDICINE

## 2017-05-03 PROCEDURE — 85025 COMPLETE CBC W/AUTO DIFF WBC: CPT | Performed by: INTERNAL MEDICINE

## 2017-05-03 PROCEDURE — 74022 RADEX COMPL AQT ABD SERIES: CPT

## 2017-05-03 PROCEDURE — 93005 ELECTROCARDIOGRAM TRACING: CPT

## 2017-05-03 PROCEDURE — 82550 ASSAY OF CK (CPK): CPT | Performed by: INTERNAL MEDICINE

## 2017-05-03 PROCEDURE — 80061 LIPID PANEL: CPT | Performed by: INTERNAL MEDICINE

## 2017-05-03 RX ORDER — POLYETHYLENE GLYCOL 3350 17 G/17G
17 POWDER, FOR SOLUTION ORAL DAILY
Status: DISCONTINUED | OUTPATIENT
Start: 2017-05-03 | End: 2017-05-08 | Stop reason: HOSPADM

## 2017-05-03 RX ORDER — SODIUM CHLORIDE 9 MG/ML
50 INJECTION, SOLUTION INTRAVENOUS CONTINUOUS
Status: DISCONTINUED | OUTPATIENT
Start: 2017-05-03 | End: 2017-05-08 | Stop reason: HOSPADM

## 2017-05-03 RX ORDER — HEPARIN SODIUM 1000 [USP'U]/ML
3000 INJECTION, SOLUTION INTRAVENOUS; SUBCUTANEOUS ONCE
Status: COMPLETED | OUTPATIENT
Start: 2017-05-03 | End: 2017-05-03

## 2017-05-03 RX ORDER — AMLODIPINE BESYLATE 5 MG/1
5 TABLET ORAL DAILY
Status: DISCONTINUED | OUTPATIENT
Start: 2017-05-03 | End: 2017-05-08 | Stop reason: HOSPADM

## 2017-05-03 RX ADMIN — HEPARIN SODIUM AND DEXTROSE 16.49 UNITS/KG/HR: 10000; 5 INJECTION INTRAVENOUS at 00:25

## 2017-05-03 RX ADMIN — NITROGLYCERIN 20 MCG/MIN: 40 INJECTION INTRAVENOUS at 18:48

## 2017-05-03 RX ADMIN — DOCUSATE SODIUM 100 MG: 100 CAPSULE, LIQUID FILLED ORAL at 10:19

## 2017-05-03 RX ADMIN — SIMVASTATIN 10 MG: 10 TABLET, FILM COATED ORAL at 22:17

## 2017-05-03 RX ADMIN — MAGNESIUM HYDROXIDE 30 ML: 400 SUSPENSION ORAL at 01:03

## 2017-05-03 RX ADMIN — MELATONIN TAB 3 MG 3 MG: 3 TAB at 00:59

## 2017-05-03 RX ADMIN — HEPARIN SODIUM 3000 UNITS: 1000 INJECTION, SOLUTION INTRAVENOUS; SUBCUTANEOUS at 16:56

## 2017-05-03 RX ADMIN — CLOPIDOGREL BISULFATE 75 MG: 75 TABLET ORAL at 10:19

## 2017-05-03 RX ADMIN — METHIMAZOLE 5 MG: 10 TABLET ORAL at 10:19

## 2017-05-03 RX ADMIN — ASPIRIN 81 MG CHEWABLE TABLET 81 MG: 81 TABLET CHEWABLE at 10:18

## 2017-05-03 RX ADMIN — HEPARIN SODIUM AND DEXTROSE 734 UNITS/HR: 10000; 5 INJECTION INTRAVENOUS at 19:10

## 2017-05-03 RX ADMIN — HEPARIN SODIUM AND DEXTROSE 734 UNITS/HR: 10000; 5 INJECTION INTRAVENOUS at 16:53

## 2017-05-03 RX ADMIN — SODIUM CHLORIDE 75 ML/HR: 900 INJECTION, SOLUTION INTRAVENOUS at 18:52

## 2017-05-03 RX ADMIN — AMLODIPINE BESYLATE 5 MG: 5 TABLET ORAL at 10:19

## 2017-05-03 RX ADMIN — CYANOCOBALAMIN TAB 1000 MCG 1000 MCG: 1000 TAB at 10:19

## 2017-05-03 RX ADMIN — FERROUS SULFATE TAB 325 MG (65 MG ELEMENTAL FE) 325 MG: 325 (65 FE) TAB at 10:19

## 2017-05-03 RX ADMIN — DOCUSATE SODIUM 100 MG: 100 CAPSULE, LIQUID FILLED ORAL at 17:00

## 2017-05-03 RX ADMIN — Medication 10 ML: at 23:18

## 2017-05-03 RX ADMIN — Medication 10 ML: at 17:01

## 2017-05-03 RX ADMIN — SIMVASTATIN 10 MG: 10 TABLET, FILM COATED ORAL at 00:59

## 2017-05-03 NOTE — ED NOTES
Bedside and Verbal shift change report given to Sebastián Amaya (oncoming nurse) by Hudson Wild RN (offgoing nurse). Report included the following information SBAR, Kardex, Intake/Output, MAR and Recent Results.

## 2017-05-03 NOTE — ED NOTES
Bedside and Verbal shift change report given to Tawnya Garcia RN (oncoming nurse) by Higinio Cole RN (offgoing nurse). Report included the following information SBAR and ED Summary.

## 2017-05-03 NOTE — PROGRESS NOTES
Consult noted and appreciated. Chart reviewed. Patient currently DNR status. Voiced that she lives in a  Nursing home. She is unsure what brought her here but states she is very sleepy. When asked about family support she said she originally had 2 children but one  of cancer. The other child is a son who is alcoholic and use to live with her and she voices she can never go home because of it. She voiced that she is not in pain currently. She did voice being incontinent as we speak and said this is a common problem for her. She gets very sleepy during conversation and is dozing during my visit. Will continue to support when she gets admitted to the floor. No family at bedside.

## 2017-05-03 NOTE — ED NOTES
Pt manually disimpacted by RN (myself). Pt tolerated well. Pt with moderate amount of rafa like stool. Pt notes some relief after disimpaction.

## 2017-05-03 NOTE — PROGRESS NOTES
[facility]ist [facility]  Progress Note    Patient: Supriya Kuhn Age: 80 y.o. : 1926 MR#: [id number] SSN: [id number]  Date/Time: 5/3/2017 10:55 AM    Subjective:     Denies F/C, N/V, SOB. CP much improved. Feels overall, generally weak. Complains of constipation. Last BM 2-3 days ago. Drinking warm prune juice. Assessment/Plan:   1. Acute inf STEMI - declines intervention. Med tx. No CP presently despite significantly elevated trop I @ 83.7. Echo with EF 55-60%, no obvious wall motion abnml. ASA, Plavix, statin. No BBlocker given significant bradycardia earlier. Heparin gtt, NTG gtt to wean. 2. Constipation - as above. Pt requested disimpaction per nurse. MoM as needed, in process of drinking prune juice. Will add MiraLax to regimen. 3. HTN - per cardiology. Started on Norvasc, following for effect. 4. DM - by hx. SSI. Check HgbA1c. Holding Amaryl. 5. Hyperlipidemia - statin. 6. COPD - no acute. 7. BURT, dehydration - following. Will gently hydrate. 8. Hx thyrotoxicosis with goiter - methimazole. TSH noted. 9. Chart reviewed. DNR. Additional Notes:      Case discussed with:  [x]Patient  []Family  [x]Nursing  []Case Management  DVT Prophylaxis:  []Lovenox  []Hep SQ  []SCDs  []Coumadin   [x]On Heparin gtt    Objective:   VS:   Visit Vitals    /88    Pulse 86    Temp 97.3 °F (36.3 °C)    Resp 17    Wt 44.5 kg (98 lb)    SpO2 98%    BMI 17.92 kg/m2      Tmax/24hrs: No data recorded. No intake or output data in the 24 hours ending 17 1055    General:  Awake, alert, NAD. Cardiovascular:  RRR. Pulmonary:  CTA B.  GI:  Soft, NT/ND, NABS. Extremities:  No CT or edema. Additional:  Dry oral mucosa.      Labs:    Recent Results (from the past 24 hour(s))   PTT    Collection Time: 17 10:58 AM   Result Value Ref Range    aPTT >180.0 (HH) 23.0 - 36.4 SEC   HEPATIC FUNCTION PANEL    Collection Time: 17 10:58 AM   Result Value Ref Range    Protein, total 7.2 6.4 - 8.2 g/dL    Albumin 3.2 (L) 3.4 - 5.0 g/dL    Globulin 4.0 2.0 - 4.0 g/dL    A-G Ratio 0.8 0.8 - 1.7      Bilirubin, total 0.6 0.2 - 1.0 MG/DL    Bilirubin, direct 0.2 0.0 - 0.2 MG/DL    Alk.  phosphatase 52 45 - 117 U/L    AST (SGOT) 171 (H) 15 - 37 U/L    ALT (SGPT) 173 (H) 13 - 56 U/L   TSH 3RD GENERATION    Collection Time: 05/02/17 10:58 AM   Result Value Ref Range    TSH 10.70 (H) 0.36 - 3.74 uIU/mL   CBC W/O DIFF    Collection Time: 05/02/17 10:58 AM   Result Value Ref Range    WBC 8.4 4.6 - 13.2 K/uL    RBC 2.82 (L) 4.20 - 5.30 M/uL    HGB 11.2 (L) 12.0 - 16.0 g/dL    HCT 33.7 (L) 35.0 - 45.0 %    MCV 97.2 (H) 74.0 - 97.0 FL    MCH 31.0 24.0 - 34.0 PG    MCHC 33.2 31.0 - 37.0 g/dL    RDW 14.1 11.6 - 14.5 %    PLATELET 919 278 - 707 K/uL    MPV 11.1 9.2 - 11.8 FL   TROPONIN I    Collection Time: 05/02/17 10:58 AM   Result Value Ref Range    Troponin-I, Qt. 6.71 (HH) 0.0 - 0.045 NG/ML   EKG, 12 LEAD, SUBSEQUENT    Collection Time: 05/02/17  4:18 PM   Result Value Ref Range    Ventricular Rate 65 BPM    Atrial Rate 78 BPM    QRS Duration 76 ms    Q-T Interval 396 ms    QTC Calculation (Bezet) 411 ms    Calculated R Axis 9 degrees    Calculated T Axis 95 degrees    Diagnosis       Sinus rhythm with 1st degree AV block with 2nd degree AV block (Mobitz I)   with premature ventricular or aberrantly conducted complexes  Inferior infarct , possibly acute  ACUTE MI  Consider right ventricular involvement in acute inferior infarct  Abnormal ECG  When compared with ECG of 02-MAY-2017 03:37,    QRS duration has decreased  Inferior infarct is now present  T wave inversion less evident in Lateral leads  Confirmed by Abel Brick ([id number]) on 5/3/2017 7:05:53 AM     PTT    Collection Time: 05/02/17  5:30 PM   Result Value Ref Range    aPTT 47.0 (H) 23.0 - 36.4 SEC   PROTHROMBIN TIME + INR    Collection Time: 05/02/17  5:30 PM   Result Value Ref Range    Prothrombin time 13.2 11.5 - 15.2 sec INR 1.0 0.8 - 1.2     PTT    Collection Time: 05/02/17 11:35 PM   Result Value Ref Range    aPTT 92.4 (H) 23.0 - 36.4 SEC   PTT    Collection Time: 05/03/17  4:05 AM   Result Value Ref Range    aPTT 140.5 (H) 23.0 - 36.4 SEC   LIPID PANEL    Collection Time: 05/03/17  4:05 AM   Result Value Ref Range    LIPID PROFILE          Cholesterol, total 193 <200 MG/DL    Triglyceride 54 <150 MG/DL    HDL Cholesterol 93 (H) 40 - 60 MG/DL    LDL, calculated 89.2 0 - 100 MG/DL    VLDL, calculated 10.8 MG/DL    CHOL/HDL Ratio 2.1 0 - 5.0     CBC WITH AUTOMATED DIFF    Collection Time: 05/03/17  4:05 AM   Result Value Ref Range    WBC 11.4 4.6 - 13.2 K/uL    RBC 3.29 (L) 4.20 - 5.30 M/uL    HGB 11.1 (L) 12.0 - 16.0 g/dL    HCT 30.3 (L) 35.0 - 45.0 %    MCV 92.1 74.0 - 97.0 FL    MCH 33.7 24.0 - 34.0 PG    MCHC 36.6 31.0 - 37.0 g/dL    RDW 13.6 11.6 - 14.5 %    PLATELET 161 390 - 547 K/uL    MPV 10.4 9.2 - 11.8 FL    NEUTROPHILS 84 (H) 42 - 75 %    BAND NEUTROPHILS 5 0 - 5 %    LYMPHOCYTES 7 (L) 20 - 51 %    MONOCYTES 4 2 - 9 %    EOSINOPHILS 0 0 - 5 %    BASOPHILS 0 0 - 3 %    ABS. NEUTROPHILS 10.1 (H) 1.8 - 8.0 K/UL    ABS. LYMPHOCYTES 0.8 0.8 - 3.5 K/UL    ABS. MONOCYTES 0.5 0 - 1.0 K/UL    ABS. EOSINOPHILS 0.0 0.0 - 0.4 K/UL    ABS.  BASOPHILS 0.0 0.0 - 0.06 K/UL    DF AUTOMATED      PLATELET COMMENTS ADEQUATE PLATELETS      RBC COMMENTS NORMOCYTIC, NORMOCHROMIC     METABOLIC PANEL, BASIC    Collection Time: 05/03/17  4:05 AM   Result Value Ref Range    Sodium 135 (L) 136 - 145 mmol/L    Potassium 4.3 3.5 - 5.5 mmol/L    Chloride 99 (L) 100 - 108 mmol/L    CO2 26 21 - 32 mmol/L    Anion gap 10 3.0 - 18 mmol/L    Glucose 347 (H) 74 - 99 mg/dL    BUN 40 (H) 7.0 - 18 MG/DL    Creatinine 1.41 (H) 0.6 - 1.3 MG/DL    BUN/Creatinine ratio 28 (H) 12 - 20      GFR est AA 42 (L) >60 ml/min/1.73m2    GFR est non-AA 35 (L) >60 ml/min/1.73m2    Calcium 9.6 8.5 - 10.1 MG/DL   CARDIAC PANEL,(CK, CKMB & TROPONIN)    Collection Time: 05/03/17 4:05 AM   Result Value Ref Range    CK 2531 (H) 26 - 192 U/L    CK - .0 (H) <3.6 ng/ml    CK-MB Index 8.2 (H) 0.0 - 4.0 %    Troponin-I, Qt. 83.70 (HH) 0.0 - 0.045 NG/ML   EKG, 12 LEAD, SUBSEQUENT    Collection Time: 05/03/17  9:05 AM   Result Value Ref Range    Ventricular Rate 80 BPM    Atrial Rate 80 BPM    P-R Interval 172 ms    QRS Duration 70 ms    Q-T Interval 362 ms    QTC Calculation (Bezet) 417 ms    Calculated P Axis 85 degrees    Calculated R Axis -16 degrees    Calculated T Axis 95 degrees    Diagnosis       Sinus rhythm with premature supraventricular complexes  Left ventricular hypertrophy with repolarization abnormality  Inferior infarct (cited on or before 02-MAY-2017)  Anterior infarct , new  ACUTE MI  Consider right ventricular involvement in acute inferior infarct  Abnormal ECG  When compared with ECG of 02-MAY-2017 16:18,  premature supraventricular complexes are now present  Sinus rhythm is no longer with 2nd degree AV block (Mobitz I)  Acute Anterior infarct is now present         Signed By: Liz Thomas MD     May 3, 2017 10:55 AM

## 2017-05-03 NOTE — PROGRESS NOTES
[facility] - Progress Note  Admit Date: 5/2/2017    Assessment:     Hospital Problems  Date Reviewed: 7/23/2016          Codes Class Noted POA    STEMI (ST elevation myocardial infarction) [facility]) ICD-10-CM: I21.3  ICD-9-CM: 410.90  5/3/2017 Unknown              - Acute inferior STEMI, patient has opted for conservative medical management only. Troponin rising to 83. EF 55-60%. - Intermittent junctional Bradycardia, patient wishes for conservative therapy. - BURT  - H/O HTN  - Recent history of traumatic subdural hematoma (08/16): requiring craniotomy and then again [place] holes at [facility]  - Lethargy and drowsiness  - DM  - HLD  - Advance age and co-morbidities  - Renal mass  - BURT  - Mild AS  - H/o subdural hematoma  - Constipation     Previously followed by Dr. Justin Urrutia:     No further CP, No SOB. BP elevated. Will increase norvasc. Likely will need to add hydralazine/nitrates. Hold ace given worsening renal function. Holding BB given bradycardia. Wean nitro infusion as tolerates. Continue heparin infusion and continue serial biomarkers. Continue ASA, plavix statin. Subjective:     No further CP. Feels constipated.      Objective:      Patient Vitals for the past 8 hrs:   Pulse Resp BP SpO2   05/03/17 0700 86 17 170/88 98 %   05/03/17 0645 83 20 156/85 96 %   05/03/17 0630 82 21 (!) 160/94 97 %   05/03/17 0615 82 20 180/81 98 %   05/03/17 0600 89 24 (!) 137/95 98 %   05/03/17 0545 83 20 159/70 98 %   05/03/17 0530 82 20 167/83 99 %   05/03/17 0515 85 18 165/86 97 %   05/03/17 0500 81 20 161/87 98 %   05/03/17 0445 80 21 159/90 97 %   05/03/17 0430 78 20 150/89 97 %   05/03/17 0415 83 21 166/80 97 %   05/03/17 0400 94 26 (!) 164/94 100 %   05/03/17 0345 91 22 (!) 157/95 95 %   05/03/17 0315 83 16 156/85 94 %   05/03/17 0300 79 20 148/80 95 %   05/03/17 0245 84 22 162/86 95 %   05/03/17 0230 85 19 173/85 93 %   05/03/17 0145 84 21 (!) 168/94 93 %   05/03/17 0130 80 18 157/86 94 % 05/03/17 0115 82 19 158/89 93 %   05/03/17 0100 86 23 (!) 168/99 94 %   05/03/17 0045 82 17 170/81 94 %         Patient Vitals for the past 96 hrs:   Weight   05/02/17 0336 44.5 kg (98 lb)                  No intake or output data in the 24 hours ending 05/03/17 9250    Physical Exam:  General:  alert, cooperative, no distress, appears stated age  Neck:  no JVD  Lungs:  clear to auscultation bilaterally  Heart:  regular rate and rhythm 2/6 systolic murmur  Abdomen:  abdomen is soft without significant tenderness, masses, organomegaly or guarding  Extremities:  extremities normal, atraumatic, no cyanosis trace edema    Data Review:     Labs: Results:       Chemistry Recent Labs      05/03/17   0405  05/02/17   1058  05/02/17   0342   GLU  347*   --   267*   NA  135*   --   134*   K  4.3   --   4.6   CL  99*   --   99*   CO2  26   --   24   BUN  40*   --   41*   CREA  1.41*   --   1.36*   CA  9.6   --   10.0   AGAP  10   --   11   BUCR  28*   --   30*   AP   --   52  46   TP   --   7.2  7.7   ALB   --   3.2*  3.4   GLOB   --   4.0  4.3*   AGRAT   --   0.8  0.8      CBC w/Diff Recent Labs      05/03/17   0405  05/02/17   1058  05/02/17   0342   WBC  11.4  8.4  9.7   RBC  3.29*  2.82*  3.99*   HGB  11.1*  11.2*  12.6   HCT  30.3*  33.7*  35.7   PLT  229  245  251   GRANS  84*   --   74*   LYMPH  7*   --   15*   EOS  0   --   1      Cardiac Enzymes Lab Results   Component Value Date/Time    CPK 2531 (H) 05/03/2017 04:05 AM    CKMB 208.0 (H) 05/03/2017 04:05 AM    CKND1 8.2 (H) 05/03/2017 04:05 AM    TROIQ 83.70 (HH) 05/03/2017 04:05 AM    TROIQ 6.71 (HH) 05/02/2017 10:58 AM      Coagulation Recent Labs      05/03/17   0405  05/02/17   2335  05/02/17   1730   PTP   --    --   13.2   INR   --    --   1.0   APTT  140.5*  92.4*  47.0*       Lipid Panel Lab Results   Component Value Date/Time    Cholesterol, total 193 05/03/2017 04:05 AM    HDL Cholesterol 93 05/03/2017 04:05 AM    LDL, calculated 89.2 05/03/2017 04:05 AM VLDL, calculated 10.8 05/03/2017 04:05 AM    Triglyceride 54 05/03/2017 04:05 AM    CHOL/HDL Ratio 2.1 05/03/2017 04:05 AM      BNP No results found for: BNP, BNPP, XBNPT   Liver Enzymes Recent Labs      05/02/17   1058   TP  7.2   ALB  3.2*   AP  52   SGOT  171*      Digoxin    Thyroid Studies Lab Results   Component Value Date/Time    TSH 10.70 05/02/2017 10:58 AM          Signed By: BÁRBARA Chi     May 3, 2017

## 2017-05-03 NOTE — PROGRESS NOTES
Care Management Interventions  PCP Verified by CM: Yes  Palliative Care Consult (Criteria: CHF and RRAT>21): Yes  Mode of Transport at Discharge: Other (see comment)  Transition of Care Consult (CM Consult): Discharge Planning  MyChart Signup: No  Discharge Durable Medical Equipment: No  Health Maintenance Reviewed: Yes  Physical Therapy Consult: Yes  Occupational Therapy Consult: Yes  Speech Therapy Consult: No  Current Support Network: Assisted Living [phone]  Confirm Follow Up Transport: Family  Plan discussed with Pt/Family/Caregiver: Yes  Discharge Location  Discharge Placement:  (tbd)     Patient 80years old female admitted to [facility] with NSTEMI. Spoke to the patient in room alone, she is AAO x 4, open to conversation. Patient says she lives in [facility] since Sept of last year. Patient has RW and WC at the assisting living facility. Patient would like to return to [facility] at [place]. She could not remember or she attended any snfs lately. Patient gave her permission for me to contact her son-Misael    I called [facility], spoke to Mrs. Dooley, she confirmed this patient is their resident. Her return to the facility will be based on healthcare team recommendations. Will continue to follow.

## 2017-05-03 NOTE — PROGRESS NOTES
1815 - Telephoned Dr. Michela Russell, patient had 7 beats of Vtach, multi focal, 125-168 hr.  Patient talking with relatives, vs taken, asymptomatic.

## 2017-05-03 NOTE — ED NOTES
Dr. Chang Ray informed of pt's elevated troponin and elevated BP of 161/87 while maxed out on her titratable nitro drip. No further orders at this time.

## 2017-05-03 NOTE — ROUTINE PROCESS
In patient's chart, taking report, however, Stemi patient. [id number] [phone] - Approved by hospitalist to be on telemetry floor, patient DNR, talked with palliative care, per charge nurse Анна Thacker RN.    4395 - TRANSFER - IN REPORT:    Verbal report received from [facility], RN(name) on Alcon Malagon  being received from ED(unit) for routine progression of care      Report consisted of patients Situation, Background, Assessment and   Recommendations(SBAR). Information from the following report(s) SBAR, Kardex and MAR was reviewed with the receiving nurse. Opportunity for questions and clarification was provided. Assessment completed upon patients arrival to unit and care assumed.

## 2017-05-03 NOTE — ROUTINE PROCESS
TRANSFER - OUT REPORT:    Verbal report given to JOHANNA Ramsey(name) on Alpesh Patel  being transferred to [address] [place] [address](unit) for routine progression of care       Report consisted of patients Situation, Background, Assessment and   Recommendations(SBAR). Information from the following report(s) SBAR, ED Summary, Procedure Summary, Intake/Output, MAR, Recent Results and Cardiac Rhythm STEMI was reviewed with the receiving nurse. Lines:   Peripheral IV 05/02/17 Right Antecubital (Active)   Site Assessment Clean, dry, & intact 5/2/2017  3:49 AM   Phlebitis Assessment 0 5/2/2017  3:49 AM   Infiltration Assessment 0 5/2/2017  3:49 AM   Dressing Status Clean, dry, & intact 5/2/2017  3:49 AM   Dressing Type Transparent 5/2/2017  3:49 AM   Hub Color/Line Status Green;Flushed;Patent 5/2/2017  3:49 AM       Peripheral IV 05/02/17 Left Wrist (Active)   Site Assessment Clean, dry, & intact 5/2/2017  5:07 AM   Phlebitis Assessment 0 5/2/2017  5:07 AM   Infiltration Assessment 0 5/2/2017  5:07 AM   Dressing Status Clean, dry, & intact 5/2/2017  5:07 AM   Dressing Type Transparent 5/2/2017  5:07 AM   Hub Color/Line Status Flushed;Patent 5/2/2017  5:07 AM        Opportunity for questions and clarification was provided.       Patient transported with:   Monitor  O2 @ 2 liters  Registered Nurse  Tech   Patient belongings

## 2017-05-03 NOTE — DIABETES MGMT
GLYCEMIC CONTROL SCREENING INITIATED:    Lab Results   Component Value Date/Time    Hemoglobin A1c 7.3 03/22/2017 12:58 PM    Hemoglobin A1c, External 8.3 07/30/2015      Lab Results   Component Value Date/Time    Glucose 347 05/03/2017 04:05 AM         [x]  Glucose values exceeding inpatient target range: -180mg/dl            non-ICU 70-180mg/dl      [x]  Recommend corrective insulin per IP Insulin order set.       [x]  Standard insulin scale []  Very insuln resistant scale    Denton Eisenmenger, NEETU, CDE

## 2017-05-03 NOTE — ROUTINE PROCESS
Received patient from ED, changed sheets, pad, gown, telemetry box on at this time, call bell, phone within reach, bed in locked position. Required documentation and admission assessment completed. 7264 - Bedside shift change report given to Duncan Gonzalez RN (oncoming nurse) by Gabbie Covarrubias RN (offgoing nurse). Report included the following information SBAR, Kardex and MAR.

## 2017-05-04 LAB
ANION GAP BLD CALC-SCNC: 8 MMOL/L (ref 3–18)
APTT PPP: 32.5 SEC (ref 23–36.4)
APTT PPP: 46.9 SEC (ref 23–36.4)
ATRIAL RATE: 80 BPM
BASOPHILS # BLD AUTO: 0 K/UL (ref 0–0.06)
BASOPHILS # BLD: 0 % (ref 0–3)
BUN SERPL-MCNC: 26 MG/DL (ref 7–18)
BUN/CREAT SERPL: 29 (ref 12–20)
CALCIUM SERPL-MCNC: 8.4 MG/DL (ref 8.5–10.1)
CALCULATED P AXIS, ECG09: 85 DEGREES
CALCULATED R AXIS, ECG10: -16 DEGREES
CALCULATED T AXIS, ECG11: 95 DEGREES
CHLORIDE SERPL-SCNC: 99 MMOL/L (ref 100–108)
CK MB CFR SERPL CALC: 3 % (ref 0–4)
CK MB SERPL-MCNC: 15 NG/ML (ref 5–25)
CK SERPL-CCNC: 502 U/L (ref 26–192)
CO2 SERPL-SCNC: 28 MMOL/L (ref 21–32)
CREAT SERPL-MCNC: 0.91 MG/DL (ref 0.6–1.3)
DIAGNOSIS, 93000: NORMAL
DIFFERENTIAL METHOD BLD: ABNORMAL
EOSINOPHIL # BLD: 0 K/UL (ref 0–0.4)
EOSINOPHIL NFR BLD: 0 % (ref 0–5)
ERYTHROCYTE [DISTWIDTH] IN BLOOD BY AUTOMATED COUNT: 13.8 % (ref 11.6–14.5)
GLUCOSE SERPL-MCNC: 250 MG/DL (ref 74–99)
HCT VFR BLD AUTO: 26 % (ref 35–45)
HGB BLD-MCNC: 10 G/DL (ref 12–16)
LYMPHOCYTES # BLD AUTO: 8 % (ref 20–51)
LYMPHOCYTES # BLD: 0.7 K/UL (ref 0.8–3.5)
MCH RBC QN AUTO: 36.1 PG (ref 24–34)
MCHC RBC AUTO-ENTMCNC: 38.5 G/DL (ref 31–37)
MCV RBC AUTO: 93.9 FL (ref 74–97)
MONOCYTES # BLD: 1.3 K/UL (ref 0–1)
MONOCYTES NFR BLD AUTO: 14 % (ref 2–9)
NEUTS BAND NFR BLD MANUAL: 2 % (ref 0–5)
NEUTS SEG # BLD: 7.3 K/UL (ref 1.8–8)
NEUTS SEG NFR BLD AUTO: 76 % (ref 42–75)
P-R INTERVAL, ECG05: 172 MS
PLATELET # BLD AUTO: 198 K/UL (ref 135–420)
PLATELET COMMENTS,PCOM: ABNORMAL
PMV BLD AUTO: 10.8 FL (ref 9.2–11.8)
POTASSIUM SERPL-SCNC: 3.7 MMOL/L (ref 3.5–5.5)
Q-T INTERVAL, ECG07: 362 MS
QRS DURATION, ECG06: 70 MS
QTC CALCULATION (BEZET), ECG08: 417 MS
RBC # BLD AUTO: 2.77 M/UL (ref 4.2–5.3)
RBC MORPH BLD: ABNORMAL
SODIUM SERPL-SCNC: 135 MMOL/L (ref 136–145)
TROPONIN I SERPL-MCNC: 29.4 NG/ML (ref 0–0.04)
VENTRICULAR RATE, ECG03: 80 BPM
WBC # BLD AUTO: 9.3 K/UL (ref 4.6–13.2)

## 2017-05-04 PROCEDURE — 74011250637 HC RX REV CODE- 250/637: Performed by: INTERNAL MEDICINE

## 2017-05-04 PROCEDURE — 85730 THROMBOPLASTIN TIME PARTIAL: CPT | Performed by: FAMILY MEDICINE

## 2017-05-04 PROCEDURE — 65660000000 HC RM CCU STEPDOWN

## 2017-05-04 PROCEDURE — 80048 BASIC METABOLIC PNL TOTAL CA: CPT | Performed by: FAMILY MEDICINE

## 2017-05-04 PROCEDURE — 74011250636 HC RX REV CODE- 250/636: Performed by: FAMILY MEDICINE

## 2017-05-04 PROCEDURE — 74011250636 HC RX REV CODE- 250/636: Performed by: EMERGENCY MEDICINE

## 2017-05-04 PROCEDURE — 74011250637 HC RX REV CODE- 250/637: Performed by: FAMILY MEDICINE

## 2017-05-04 PROCEDURE — 36415 COLL VENOUS BLD VENIPUNCTURE: CPT | Performed by: FAMILY MEDICINE

## 2017-05-04 PROCEDURE — 82550 ASSAY OF CK (CPK): CPT | Performed by: PHYSICIAN ASSISTANT

## 2017-05-04 PROCEDURE — 74011250637 HC RX REV CODE- 250/637: Performed by: PHYSICIAN ASSISTANT

## 2017-05-04 PROCEDURE — 85025 COMPLETE CBC W/AUTO DIFF WBC: CPT | Performed by: FAMILY MEDICINE

## 2017-05-04 RX ADMIN — SODIUM CHLORIDE 50 ML/HR: 900 INJECTION, SOLUTION INTRAVENOUS at 22:26

## 2017-05-04 RX ADMIN — DOCUSATE SODIUM 100 MG: 100 CAPSULE, LIQUID FILLED ORAL at 17:43

## 2017-05-04 RX ADMIN — FERROUS SULFATE TAB 325 MG (65 MG ELEMENTAL FE) 325 MG: 325 (65 FE) TAB at 09:08

## 2017-05-04 RX ADMIN — HEPARIN SODIUM AND DEXTROSE 734 UNITS/HR: 10000; 5 INJECTION INTRAVENOUS at 02:32

## 2017-05-04 RX ADMIN — Medication 10 ML: at 22:41

## 2017-05-04 RX ADMIN — DOCUSATE SODIUM 100 MG: 100 CAPSULE, LIQUID FILLED ORAL at 09:07

## 2017-05-04 RX ADMIN — CYANOCOBALAMIN TAB 1000 MCG 1000 MCG: 1000 TAB at 09:08

## 2017-05-04 RX ADMIN — CLOPIDOGREL BISULFATE 75 MG: 75 TABLET ORAL at 09:08

## 2017-05-04 RX ADMIN — ASPIRIN 81 MG CHEWABLE TABLET 81 MG: 81 TABLET CHEWABLE at 09:08

## 2017-05-04 RX ADMIN — HEPARIN SODIUM AND DEXTROSE 134 UNITS/HR: 10000; 5 INJECTION INTRAVENOUS at 07:50

## 2017-05-04 RX ADMIN — Medication 10 ML: at 14:00

## 2017-05-04 RX ADMIN — SIMVASTATIN 10 MG: 10 TABLET, FILM COATED ORAL at 22:40

## 2017-05-04 RX ADMIN — AMLODIPINE BESYLATE 5 MG: 5 TABLET ORAL at 09:08

## 2017-05-04 RX ADMIN — POLYETHYLENE GLYCOL 3350 17 G: 17 POWDER, FOR SOLUTION ORAL at 09:07

## 2017-05-04 RX ADMIN — METHIMAZOLE 5 MG: 10 TABLET ORAL at 09:07

## 2017-05-04 NOTE — PROGRESS NOTES
conducted a Follow up consultation and Spiritual Assessment for Daquan Zacarias, who is a 80 y.o.,female. The  provided the following Interventions:  Continued the relationship of care and support. Listened empathically. Offered prayer and assurance of continued prayer on patients behalf. Chart reviewed. The following outcomes were achieved:  Patient expressed gratitude for 's visit. Assessment:  Patient's strong amaya in God helps her to cope very well. Strong support from her Restorationist and Restorationist friends. There are no further spiritual or Evangelical issues which require Spiritual Care Services interventions at this time. Plan:  Chaplains will continue to follow and will provide pastoral care on an as needed/requested basis.  recommends bedside caregivers page  on duty if patient shows signs of acute spiritual or emotional distress.      [address] [facility] Certified [address] [place] [address]   [phone]

## 2017-05-04 NOTE — PROGRESS NOTES
[facility] Hospitalist Group  Progress Note    Patient: Bryan Macias Age: 80 y.o. : 1926 MR#: [id number] SSN: [id number]  Date/Time: 2017 10:55 AM    Subjective:     No F/C, N/V, CP, SOB. Has no c/o. Does not recall who I was, but is pleasant and conversant with friend @ bedside. Assessment/Plan:   1. Acute inf STEMI - declines intervention. Med tx. No CP presently despite significantly elevated trop I @ 83.7. Echo with EF 55-60%, no obvious wall motion abnml. ASA, Plavix, statin. No BBlocker given significant bradycardia earlier. Stopping heparin gtt, wean NTG. Trop I down from 83.7 to 29.4. 2. Constipation - as above. Pt requested disimpaction per nurse. Maintain bowel regimen. 3. HTN - BPs wnl.   4. DM - by hx. SSI. Check HgbA1c. Holding Amaryl. 5. Hyperlipidemia - statin. 6. COPD - no acute. 7. BURT, dehydration - prerenal. Resolved with fluids. Will decrease IVFluids. 8. Hx thyrotoxicosis with goiter - methimazole. TSH noted. 9. Hx chronic anemia - baseline Hgb 10-12. Slight drop from admission. Off heparin gtt. See no w/u. Will check iron profile/B12/folate, hemoccult. Appears hemodynamically stable. 10. Chart reviewed. DNR. Additional Notes:      Case discussed with:  [x]Patient  []Family  [x]Nursing  []Case Management  DVT Prophylaxis:  []Lovenox  []Hep SQ  []SCDs  []Coumadin   [x]On Heparin gtt    Objective:   VS:   Visit Vitals    /66 (BP 1 Location: Left arm, BP Patient Position: At rest)    Pulse 92    Temp 97.8 °F (36.6 °C)    Resp 16    Ht 5' 2\" (1.575 m)    Wt 44.5 kg (98 lb)    SpO2 98%    BMI 17.92 kg/m2      Tmax/24hrs: Temp (24hrs), Av.1 °F (36.7 °C), Min:96.7 °F (35.9 °C), Max:99.2 °F (37.3 °C)  No intake or output data in the 24 hours ending 17 1528    General:  Awake, alert, NAD. Cardiovascular:  RRR. Pulmonary:  CTA B.  GI:  Soft, NT/ND, NABS. Extremities:  No CT or edema.   Additional:      Labs: Recent Results (from the past 24 hour(s))   PTT    Collection Time: 05/03/17  8:21 PM   Result Value Ref Range    aPTT >180.0 (HH) 23.0 - 47.6 SEC   METABOLIC PANEL, BASIC    Collection Time: 05/04/17  3:40 AM   Result Value Ref Range    Sodium 135 (L) 136 - 145 mmol/L    Potassium 3.7 3.5 - 5.5 mmol/L    Chloride 99 (L) 100 - 108 mmol/L    CO2 28 21 - 32 mmol/L    Anion gap 8 3.0 - 18 mmol/L    Glucose 250 (H) 74 - 99 mg/dL    BUN 26 (H) 7.0 - 18 MG/DL    Creatinine 0.91 0.6 - 1.3 MG/DL    BUN/Creatinine ratio 29 (H) 12 - 20      GFR est AA >60 >60 ml/min/1.73m2    GFR est non-AA 58 (L) >60 ml/min/1.73m2    Calcium 8.4 (L) 8.5 - 10.1 MG/DL   CBC WITH AUTOMATED DIFF    Collection Time: 05/04/17  3:40 AM   Result Value Ref Range    WBC 9.3 4.6 - 13.2 K/uL    RBC 2.77 (L) 4.20 - 5.30 M/uL    HGB 10.0 (L) 12.0 - 16.0 g/dL    HCT 26.0 (L) 35.0 - 45.0 %    MCV 93.9 74.0 - 97.0 FL    MCH 36.1 (H) 24.0 - 34.0 PG    MCHC 38.5 (H) 31.0 - 37.0 g/dL    RDW 13.8 11.6 - 14.5 %    PLATELET 681 448 - 912 K/uL    MPV 10.8 9.2 - 11.8 FL    NEUTROPHILS 76 (H) 42 - 75 %    BAND NEUTROPHILS 2 0 - 5 %    LYMPHOCYTES 8 (L) 20 - 51 %    MONOCYTES 14 (H) 2 - 9 %    EOSINOPHILS 0 0 - 5 %    BASOPHILS 0 0 - 3 %    ABS. NEUTROPHILS 7.3 1.8 - 8.0 K/UL    ABS. LYMPHOCYTES 0.7 (L) 0.8 - 3.5 K/UL    ABS. MONOCYTES 1.3 (H) 0 - 1.0 K/UL    ABS. EOSINOPHILS 0.0 0.0 - 0.4 K/UL    ABS.  BASOPHILS 0.0 0.0 - 0.06 K/UL    DF MANUAL      PLATELET COMMENTS ADEQUATE PLATELETS      RBC COMMENTS NORMOCYTIC, NORMOCHROMIC     PTT    Collection Time: 05/04/17  3:40 AM   Result Value Ref Range    aPTT 46.9 (H) 23.0 - 36.4 SEC   PTT    Collection Time: 05/04/17 10:05 AM   Result Value Ref Range    aPTT 32.5 23.0 - 36.4 SEC   CARDIAC PANEL,(CK, CKMB & TROPONIN)    Collection Time: 05/04/17 10:30 AM   Result Value Ref Range     (H) 26 - 192 U/L    CK - MB 15.0 (H) <3.6 ng/ml    CK-MB Index 3.0 0.0 - 4.0 %    Troponin-I, Qt. 29.40 (HH) 0.0 - 0.045 NG/ML Signed By: Bharati Ojeda MD     May 4, 2017 10:55 AM

## 2017-05-04 NOTE — CDMP QUERY
Patient is noted to have a BMI of 17.92. Please clarify if this patient is:     =>Underweight  =>Cachexia  =>Failure to Thrive  =>Other explanation of clinical findings  =>Unable to determine (no explanation for clinical findings)    The medical record reflects the following:  Risk:   --80year old with PMH: COPD, DM, osteoarthritis  --admitted with STEMI, BURT    Clinical Indicators:  --weight of 98 lbs    Treatment:   --daily weights ordered    Please clarify and document your clinical opinion in the progress notes and discharge summary including the definitive and/or presumptive diagnosis, (suspected or probable), related to the above clinical findings. Please include clinical findings supporting your diagnosis. If you DECLINE this query or would like to communicate with [employer], please utilize the \"[employer] message box\" at the TOP of the Progress Note on the right.       Thank you,  Sylvia Nicholas [place] [phone]

## 2017-05-04 NOTE — ROUTINE PROCESS
Bedside and Verbal shift change report given to Jorge A Telles RN (oncoming nurse) by Liudmila Carter RN (offgoing nurse). Report included the following information SBAR, MAR and Recent Results.

## 2017-05-04 NOTE — DIABETES MGMT
GLYCEMIC CONTROL SCREENING INITIATED:    Lab Results   Component Value Date/Time    Hemoglobin A1c 7.3 03/22/2017 12:58 PM    Hemoglobin A1c, External 8.3 07/30/2015      Lab Results   Component Value Date/Time    Glucose 250 05/04/2017 03:40 AM         [x]  Glucose values exceeding inpatient target range: -180mg/dl            non-ICU 70-180mg/dl      [x]  Recommend corrective insulin per IP Insulin order set.       [x]  Standard insulin scale []  Very insuln resistant scale    Christophe Adams, NEETU, CDE

## 2017-05-04 NOTE — PROGRESS NOTES
NUTRITION    Nutrition Screen      RECOMMENDATIONS / PLAN:     - Add supplements: Glucerna Shake TID.   - Continue RD inpatient monitoring and evaluation. NUTRITION INTERVENTIONS & DIAGNOSIS:     [x] Meals/Snacks: modified diet  [x] Medical food supplementation: initiate      Nutrition Diagnosis: Underweight related to inadequate energy intake as evidenced by BMI of 17 kg/m^2. ASSESSMENT:     Subjective/Objective:  Eating well PTA. Fair appetite today, ate less than 50% of recent meals. Agreeable to supplements. Average po intake adequate to meet patients estimated nutritional needs:   [] Yes     [x] No   [] Unable to determine at this time    Diet:     DIET DIABETIC CONSISTENT CARB Regular    Food Allergies: NKFA  Current Appetite:   [] Good     [x] Fair     [] Poor     [] Other:  Appetite/meal intake prior to admission:   [x] Good     [] Fair     [] Poor     [] Other:  Feeding Limitations:  [] Swallowing difficulty    [] Chewing difficulty    [] Other:  Current Meal Intake: No data found. BM: 5/3    Skin Integrity: WDL  Edema: none   Pertinent Medications: Reviewed    Recent Labs      05/04/17   0340  05/03/17   0405  05/02/17   1058  05/02/17   0342   NA  135*  135*   --   134*   K  3.7  4.3   --   4.6   CL  99*  99*   --   99*   CO2  28  26   --   24   GLU  250*  347*   --   267*   BUN  26*  40*   --   41*   CREA  0.91  1.41*   --   1.36*   CA  8.4*  9.6   --   10.0   ALB   --    --   3.2*  3.4   SGOT   --    --   171*  46*   ALT   --    --   173*  52     No intake or output data in the 24 hours ending 05/04/17 1321    Anthropometrics:  Ht Readings from Last 1 Encounters:   05/04/17 5' 2\" (1.575 m)     Last 3 Recorded Weights in this Encounter    05/02/17 0336   Weight: 44.5 kg (98 lb)     Body mass index is 17.92 kg/(m^2).    Underweight        Weight History: current weight is 90 lb and previous weight was 98 lb last year per patient and family report (8 lb, 8% weight loss x year)     Weight Metrics 5/2/2017 3/22/2017 7/21/2016 5/25/2016 4/13/2016 1/19/2016 12/7/2015   Weight 98 lb 100 lb 89 lb 92 lb 95 lb 93 lb 88 lb   BMI 17.92 kg/m2 18.29 kg/m2 16.27 kg/m2 16.82 kg/m2 17.37 kg/m2 17.01 kg/m2 16.09 kg/m2        Admitting Diagnosis: STEMI (ST elevation myocardial infarction) ([facility])  STEMI (ST elevation myocardial infarction) ([facility])  Past Medical History:   Diagnosis Date    Abnormality of gait     Acute upper respiratory infections of unspecified site     Broken shoulder     right    Chest pain     Chronic airway obstruction, not elsewhere classified     COPD     Diabetes ([facility] [address]     Essential hypertension, benign     Hearing loss     History of tachycardia     possible SVT    Hypercholesterolemia     Hyperlipidemia     Hypertension     Incontinence     Mitral valve prolapse     MVP (mitral valve prolapse)     OAB (overactive bladder)     Osteoarthritis     spine    Osteoporosis     Pure hypercholesterolemia     Senile osteoporosis     Thyroid disease     Thyrotoxicosis     Type II or unspecified type diabetes mellitus without mention of complication, not stated as uncontrolled     Urinary tract infection, site not specified        Education Needs:        [x] None identified  [] Identified - Not appropriate at this time  []  Identified and addressed - refer to education log  Learning Limitations:   [x] None identified  [] Identified    Cultural, Caodaism & ethnic food preferences:  [x] None identified    [] Identified and addressed     ESTIMATED NUTRITION NEEDS:     Calories: 9326-2747 kcal (MSJx1.2-1.3) based on  [] Actual BW      [x] IBW 50 kg  CHO: 130-141 gm (50% kcal)   Protein: 40-50 gm (0.8-1 gm/kg) based on  [] Actual BW      [x] IBW   Fluid: 1 mL/kcal     MONITORING & EVALUATION:     Nutrition Goal(s):   1. Po intake of meals will meet >75% of patient estimated nutritional needs within the next 7 days.   Outcome:  [] Met/Ongoing    []  Not Met    [x] New/Initial Goal Monitoring:   [x] Diet tolerance   [x] Meal intake   [x] Supplement intake   [] GI symptoms/ability to tolerate po diet   [] Respiratory status   [] Plan of care      Previous Recommendations (for follow-up assessments only):     []   Implemented       []   Not Implemented (RD to address)     [] No Recommendation Made     Discharge Planning: diabetic diet   [x] Participated in care planning, discharge planning, & interdisciplinary rounds as appropriate      Ivan Carr, [address], [address] [place] [address]    Pager: [phone]

## 2017-05-04 NOTE — ROUTINE PROCESS
1907 - [facility] patient was resting in bed, HOB elevated, oriented to call button. Gave report to Jaycob Jarrett RN, using SBAR, MAR, and Kardex.

## 2017-05-04 NOTE — PROGRESS NOTES
[facility] - Progress Note  Admit Date: 5/2/2017     The patient was seen, examined, and independently evaluated and I agree with the below assessment and plan by Dorothy Menezes PA-C with the following comments. This lady appears to be stable and troponin's beginning to drop with blood work this AM and in view of dropping H&H with Hct dropping from 35.7 on 5/2/2017 to 26.0 today will discontinue heparin and continue aspirin and Plavix. Assessment:     Hospital Problems  Date Reviewed: 7/23/2016          Codes Class Noted POA    STEMI (ST elevation myocardial infarction) [facility]) ICD-10-CM: I21.3  ICD-9-CM: 410.90  5/3/2017 Unknown              - Acute inferior STEMI, patient has opted for conservative medical management only. Troponin rising to 83. EF 55-60%. - Intermittent junctional Bradycardia, patient wishes for conservative therapy. - BURT  - H/O HTN  - Recent history of traumatic subdural hematoma (08/16): requiring craniotomy and then again [facility] holes at [facility]  - Lethargy and drowsiness  - DM  - HLD  - Advance age and co-morbidities  - Renal mass  - BURT  - Mild AS  - H/o subdural hematoma  - Constipation      Previously followed by Dr. Shrutih Briceno:     Remains CP free and hemodynamically stable. Will continue conservative management per patients wishes. Continue ASA, plavix, statin. No BB due to bradycardia. Will wean off nitro infusion. Follow up troponin pending. Will stop hep once trop trends down. Appreciate palliative care involvement. Subjective:     No further CP. No SOB. Feels she is not make as much urine as she normal does with her overactive bladder.     Objective:      Patient Vitals for the past 8 hrs:   Temp Pulse Resp BP SpO2   05/04/17 1140 97.8 °F (36.6 °C) 92 16 108/66 98 %   05/04/17 0748 96.7 °F (35.9 °C) 65 16 115/68 97 %         Patient Vitals for the past 96 hrs:   Weight   05/02/17 0336 44.5 kg (98 lb)                  No intake or output data in the 24 hours ending 05/04/17 1514    Physical Exam:  General:  alert, cooperative, no distress, appears stated age  Neck:  no JVD  Lungs:  clear to auscultation bilaterally  Heart:  regular rate and rhythm 2/6 systolic murmur  Abdomen:  abdomen is soft without significant tenderness, masses, organomegaly or guarding  Extremities:  extremities normal, atraumatic, no cyanosis or edema    Data Review:     Labs: Results:       Chemistry Recent Labs      05/04/17   0340  05/03/17   0405  05/02/17   1058  05/02/17   0342   GLU  250*  347*   --   267*   NA  135*  135*   --   134*   K  3.7  4.3   --   4.6   CL  99*  99*   --   99*   CO2  28  26   --   24   BUN  26*  40*   --   41*   CREA  0.91  1.41*   --   1.36*   CA  8.4*  9.6   --   10.0   AGAP  8  10   --   11   BUCR  29*  28*   --   30*   AP   --    --   52  46   TP   --    --   7.2  7.7   ALB   --    --   3.2*  3.4   GLOB   --    --   4.0  4.3*   AGRAT   --    --   0.8  0.8      CBC w/Diff Recent Labs      05/04/17   0340  05/03/17   0405  05/02/17   1058  05/02/17   0342   WBC  9.3  11.4  8.4  9.7   RBC  2.77*  3.29*  2.82*  3.99*   HGB  10.0*  11.1*  11.2*  12.6   HCT  26.0*  30.3*  33.7*  35.7   PLT  198  229  245  251   GRANS  76*  84*   --   74*   LYMPH  8*  7*   --   15*   EOS  0  0   --   1      Cardiac Enzymes Lab Results   Component Value Date/Time     (H) 05/04/2017 10:30 AM    CKMB 15.0 (H) 05/04/2017 10:30 AM    CKND1 3.0 05/04/2017 10:30 AM    TROIQ 29.40 (HH) 05/04/2017 10:30 AM      Coagulation Recent Labs      05/04/17   1005  05/04/17   0340   05/02/17   1730   PTP   --    --    --   13.2   INR   --    --    --   1.0   APTT  32.5  46.9*   < >  47.0*    < > = values in this interval not displayed.        Lipid Panel Lab Results   Component Value Date/Time    Cholesterol, total 193 05/03/2017 04:05 AM    HDL Cholesterol 93 05/03/2017 04:05 AM    LDL, calculated 89.2 05/03/2017 04:05 AM    VLDL, calculated 10.8 05/03/2017 04:05 AM    Triglyceride 54 05/03/2017 04:05 AM    CHOL/HDL Ratio 2.1 05/03/2017 04:05 AM      BNP No results found for: BNP, BNPP, XBNPT   Liver Enzymes Recent Labs      05/02/17   1058   TP  7.2   ALB  3.2*   AP  52   SGOT  171*      Digoxin    Thyroid Studies Lab Results   Component Value Date/Time    TSH 10.70 05/02/2017 10:58 AM          Signed By: Mc Contreras DO     May 4, 2017

## 2017-05-04 NOTE — NURSE NAVIGATOR
Cardiac Nurse Navigator Initial Note       Date of  Admission: 5/2/2017  3:31 AM   Hospital Day: 1    Admission type:Emergency      Patient Active Problem List    Diagnosis Date Noted    STEMI (ST elevation myocardial infarction) ([facility] [address] 05/03/2017    Hx of subdural hematoma 03/22/2017    Nocturia 02/26/2017    Spondylosis of cervical region without myelopathy or radiculopathy 11/07/2016    Advance directive discussed with patient 07/21/2016    Hypercholesterolemia     Hypertension     MVP (mitral valve prolapse)     Broken shoulder     Osteoarthritis     Incontinence     OAB (overactive bladder)     Urinary tract infection 04/13/2016    Hyperthyroidism 01/19/2016    Diabetes mellitus type 2, controlled ([address] 10/16/2015    Abnormality of gait     Acute upper respiratory infections of unspecified site     Senile osteoporosis     Urinary tract infection, site not specified     Chest pain     Thyrotoxicosis     Pure hypercholesterolemia     History of tachycardia     Mitral valve prolapse     Chronic airway obstruction, not elsewhere classified     Hypoglycemia 07/02/2010    Thyromegaly 07/02/2010    NIDDM (non-insulin dependent diabetes mellitus) 07/02/2010    HTN (hypertension) 07/02/2010    Hyperlipidemia 07/02/2010    Fatigue 07/02/2010    Thyroid nodule 07/02/2010      Tabatha Rios MD    Cardiologist: Arabella Le     Past Medical History:   Diagnosis Date    Abnormality of gait     Acute upper respiratory infections of unspecified site     Broken shoulder     right    Chest pain     Chronic airway obstruction, not elsewhere classified     COPD     Diabetes ([address]     Essential hypertension, benign     Hearing loss     History of tachycardia     possible SVT    Hypercholesterolemia     Hyperlipidemia     Hypertension     Incontinence     Mitral valve prolapse     MVP (mitral valve prolapse)     OAB (overactive bladder)     Osteoarthritis     spine    Osteoporosis  Pure hypercholesterolemia     Senile osteoporosis     Thyroid disease     Thyrotoxicosis     Type II or unspecified type diabetes mellitus without mention of complication, not stated as uncontrolled     Urinary tract infection, site not specified       Past Surgical History:   Procedure Laterality Date    HX KNEE REPLACEMENT  left    HX OPEN REDUCTION INTERNAL FIXATION Left     patellar fracture    HX OTHER SURGICAL Right     shoulder surgery    REMV CATARACT EXTRACAP,INSERT LENS,COMP  [id number]    bilateral     Current Facility-Administered Medications   Medication Dose Route Frequency    amLODIPine (NORVASC) tablet 5 mg  5 mg Oral DAILY    0.9% sodium chloride infusion  50 mL/hr IntraVENous CONTINUOUS    polyethylene glycol (MIRALAX) packet 17 g  17 g Oral DAILY    nitroglycerin (TRIDIL) 400 mcg/ml infusion  10 mcg/min IntraVENous TITRATE    acetaminophen (TYLENOL) suppository 650 mg  650 mg Rectal Q4H PRN    cyanocobalamin tablet 1,000 mcg  1,000 mcg Oral DAILY    ferrous sulfate tablet 325 mg  1 Tab Oral ACB    melatonin tablet 3 mg  3 mg Oral QHS PRN    methIMAzole (TAPAZOLE) tablet 5 mg  5 mg Oral DAILY    sodium chloride (NS) flush 5-10 mL  5-10 mL IntraVENous Q8H    sodium chloride (NS) flush 5-10 mL  5-10 mL IntraVENous PRN    magnesium hydroxide (MILK OF MAGNESIA) 400 mg/5 mL oral suspension 30 mL  30 mL Oral DAILY PRN    docusate sodium (COLACE) capsule 100 mg  100 mg Oral BID    aspirin chewable tablet 81 mg  81 mg Oral DAILY    clopidogrel (PLAVIX) tablet 75 mg  75 mg Oral DAILY    simvastatin (ZOCOR) tablet 10 mg  10 mg Oral QHS        Prior to admission patient     Patient observed Sitting up in bed alert and verbal    Cardiographics  Patient on Telemetry: Cardiac/Telemetry Monitor On: Yes   Cardiac Rhythm (only for patients on telemetry): Cardiac Rhythm: Normal sinus rhythm    Echocardiogram:  []  None ordered    [] Results Pending       Results:     EF: 5/2/17 55-60% Labs:   Recent Results (from the past 24 hour(s))   PTT    Collection Time: 05/03/17  8:21 PM   Result Value Ref Range    aPTT >180.0 (HH) 23.0 - 05.6 SEC   METABOLIC PANEL, BASIC    Collection Time: 05/04/17  3:40 AM   Result Value Ref Range    Sodium 135 (L) 136 - 145 mmol/L    Potassium 3.7 3.5 - 5.5 mmol/L    Chloride 99 (L) 100 - 108 mmol/L    CO2 28 21 - 32 mmol/L    Anion gap 8 3.0 - 18 mmol/L    Glucose 250 (H) 74 - 99 mg/dL    BUN 26 (H) 7.0 - 18 MG/DL    Creatinine 0.91 0.6 - 1.3 MG/DL    BUN/Creatinine ratio 29 (H) 12 - 20      GFR est AA >60 >60 ml/min/1.73m2    GFR est non-AA 58 (L) >60 ml/min/1.73m2    Calcium 8.4 (L) 8.5 - 10.1 MG/DL   CBC WITH AUTOMATED DIFF    Collection Time: 05/04/17  3:40 AM   Result Value Ref Range    WBC 9.3 4.6 - 13.2 K/uL    RBC 2.77 (L) 4.20 - 5.30 M/uL    HGB 10.0 (L) 12.0 - 16.0 g/dL    HCT 26.0 (L) 35.0 - 45.0 %    MCV 93.9 74.0 - 97.0 FL    MCH 36.1 (H) 24.0 - 34.0 PG    MCHC 38.5 (H) 31.0 - 37.0 g/dL    RDW 13.8 11.6 - 14.5 %    PLATELET 326 674 - 191 K/uL    MPV 10.8 9.2 - 11.8 FL    NEUTROPHILS 76 (H) 42 - 75 %    BAND NEUTROPHILS 2 0 - 5 %    LYMPHOCYTES 8 (L) 20 - 51 %    MONOCYTES 14 (H) 2 - 9 %    EOSINOPHILS 0 0 - 5 %    BASOPHILS 0 0 - 3 %    ABS. NEUTROPHILS 7.3 1.8 - 8.0 K/UL    ABS. LYMPHOCYTES 0.7 (L) 0.8 - 3.5 K/UL    ABS. MONOCYTES 1.3 (H) 0 - 1.0 K/UL    ABS. EOSINOPHILS 0.0 0.0 - 0.4 K/UL    ABS.  BASOPHILS 0.0 0.0 - 0.06 K/UL    DF MANUAL      PLATELET COMMENTS ADEQUATE PLATELETS      RBC COMMENTS NORMOCYTIC, NORMOCHROMIC     PTT    Collection Time: 05/04/17  3:40 AM   Result Value Ref Range    aPTT 46.9 (H) 23.0 - 36.4 SEC   PTT    Collection Time: 05/04/17 10:05 AM   Result Value Ref Range    aPTT 32.5 23.0 - 36.4 SEC   CARDIAC PANEL,(CK, CKMB & TROPONIN)    Collection Time: 05/04/17 10:30 AM   Result Value Ref Range     (H) 26 - 192 U/L    CK - MB 15.0 (H) <3.6 ng/ml    CK-MB Index 3.0 0.0 - 4.0 %    Troponin-I, Qt. 29.40 (HH) 0.0 - 0.045 NG/ML     Lab Results   Component Value Date/Time    Hemoglobin A1c 7.3 03/22/2017 12:58 PM    Hemoglobin A1c, External 8.3 07/30/2015       Code Status: DNR    Patient would benefit from:  [] Cardiac Rehab             []Home Health   [] PT  [x] Case Management             [] H2H                           [] OT                                    [] Nutrition                              []  Apnea Link                [] 6 minute walk                            []  Outpatient sleep study  [] Other:         [x] Palliative Care     Education conservatively reviewed with patient and patient given opportunity to ask questions.       Ama Richards RN

## 2017-05-04 NOTE — ROUTINE PROCESS
1907 - Received bedside report from [address] [place] [address], patient was resting in bed, HOB elevated, oriented pt to call button. Gave bedside report to Tatum Bal RN, using SBAR, MAR, and Kardex.

## 2017-05-05 ENCOUNTER — HOSPICE ADMISSION (OUTPATIENT)
Dept: HOSPICE | Facility: HOSPICE | Age: 82
End: 2017-05-05

## 2017-05-05 LAB
BASOPHILS # BLD AUTO: 0 K/UL (ref 0–0.1)
BASOPHILS # BLD: 0 % (ref 0–2)
DIFFERENTIAL METHOD BLD: ABNORMAL
EOSINOPHIL # BLD: 0 K/UL (ref 0–0.4)
EOSINOPHIL NFR BLD: 0 % (ref 0–5)
ERYTHROCYTE [DISTWIDTH] IN BLOOD BY AUTOMATED COUNT: 13.6 % (ref 11.6–14.5)
EST. AVERAGE GLUCOSE BLD GHB EST-MCNC: 166 MG/DL
FOLATE SERPL-MCNC: 10.8 NG/ML (ref 3.1–17.5)
GLUCOSE BLD STRIP.AUTO-MCNC: 124 MG/DL (ref 70–110)
GLUCOSE BLD STRIP.AUTO-MCNC: 243 MG/DL (ref 70–110)
GLUCOSE BLD STRIP.AUTO-MCNC: 321 MG/DL (ref 70–110)
HBA1C MFR BLD: 7.4 % (ref 4.2–5.6)
HCT VFR BLD AUTO: 26.9 % (ref 35–45)
HGB BLD-MCNC: 9.6 G/DL (ref 12–16)
IRON SATN MFR SERPL: 13 %
IRON SERPL-MCNC: 31 UG/DL (ref 50–175)
LYMPHOCYTES # BLD AUTO: 9 % (ref 21–52)
LYMPHOCYTES # BLD: 0.6 K/UL (ref 0.9–3.6)
MCH RBC QN AUTO: 33.1 PG (ref 24–34)
MCHC RBC AUTO-ENTMCNC: 35.7 G/DL (ref 31–37)
MCV RBC AUTO: 92.8 FL (ref 74–97)
MONOCYTES # BLD: 1.2 K/UL (ref 0.05–1.2)
MONOCYTES NFR BLD AUTO: 18 % (ref 3–10)
NEUTS SEG # BLD: 4.8 K/UL (ref 1.8–8)
NEUTS SEG NFR BLD AUTO: 73 % (ref 40–73)
PLATELET # BLD AUTO: 170 K/UL (ref 135–420)
PMV BLD AUTO: 10.8 FL (ref 9.2–11.8)
RBC # BLD AUTO: 2.9 M/UL (ref 4.2–5.3)
TIBC SERPL-MCNC: 240 UG/DL (ref 250–450)
VIT B12 SERPL-MCNC: 944 PG/ML (ref 211–911)
WBC # BLD AUTO: 6.6 K/UL (ref 4.6–13.2)

## 2017-05-05 PROCEDURE — 82962 GLUCOSE BLOOD TEST: CPT

## 2017-05-05 PROCEDURE — 74011636637 HC RX REV CODE- 636/637: Performed by: FAMILY MEDICINE

## 2017-05-05 PROCEDURE — 65660000000 HC RM CCU STEPDOWN

## 2017-05-05 PROCEDURE — 97530 THERAPEUTIC ACTIVITIES: CPT

## 2017-05-05 PROCEDURE — 36415 COLL VENOUS BLD VENIPUNCTURE: CPT | Performed by: INTERNAL MEDICINE

## 2017-05-05 PROCEDURE — 74011250637 HC RX REV CODE- 250/637: Performed by: FAMILY MEDICINE

## 2017-05-05 PROCEDURE — 97162 PT EVAL MOD COMPLEX 30 MIN: CPT

## 2017-05-05 PROCEDURE — 83540 ASSAY OF IRON: CPT | Performed by: INTERNAL MEDICINE

## 2017-05-05 PROCEDURE — 74011250636 HC RX REV CODE- 250/636: Performed by: FAMILY MEDICINE

## 2017-05-05 PROCEDURE — 74011250637 HC RX REV CODE- 250/637: Performed by: NURSE PRACTITIONER

## 2017-05-05 PROCEDURE — 74011250637 HC RX REV CODE- 250/637: Performed by: INTERNAL MEDICINE

## 2017-05-05 PROCEDURE — A6213 FOAM DRG >16<=48 SQ IN W/BDR: HCPCS

## 2017-05-05 PROCEDURE — 74011250637 HC RX REV CODE- 250/637: Performed by: PHYSICIAN ASSISTANT

## 2017-05-05 PROCEDURE — 85025 COMPLETE CBC W/AUTO DIFF WBC: CPT | Performed by: INTERNAL MEDICINE

## 2017-05-05 PROCEDURE — 82607 VITAMIN B-12: CPT | Performed by: INTERNAL MEDICINE

## 2017-05-05 PROCEDURE — 83036 HEMOGLOBIN GLYCOSYLATED A1C: CPT | Performed by: FAMILY MEDICINE

## 2017-05-05 RX ORDER — MORPHINE SULFATE 100 MG/5ML
5 SOLUTION ORAL
Qty: 30 ML | Refills: 0 | Status: SHIPPED | OUTPATIENT
Start: 2017-05-05 | End: 2017-05-08

## 2017-05-05 RX ORDER — ACETAMINOPHEN 325 MG/1
325 TABLET ORAL
Status: DISCONTINUED | OUTPATIENT
Start: 2017-05-05 | End: 2017-05-08 | Stop reason: HOSPADM

## 2017-05-05 RX ORDER — ACETAMINOPHEN 325 MG/1
650 TABLET ORAL 2 TIMES DAILY
Status: DISCONTINUED | OUTPATIENT
Start: 2017-05-05 | End: 2017-05-08 | Stop reason: HOSPADM

## 2017-05-05 RX ORDER — DEXTROSE 50 % IN WATER (D50W) INTRAVENOUS SYRINGE
25-50 AS NEEDED
Status: DISCONTINUED | OUTPATIENT
Start: 2017-05-05 | End: 2017-05-08 | Stop reason: HOSPADM

## 2017-05-05 RX ORDER — MAGNESIUM SULFATE 100 %
4 CRYSTALS MISCELLANEOUS AS NEEDED
Status: DISCONTINUED | OUTPATIENT
Start: 2017-05-05 | End: 2017-05-08 | Stop reason: HOSPADM

## 2017-05-05 RX ORDER — INSULIN LISPRO 100 [IU]/ML
INJECTION, SOLUTION INTRAVENOUS; SUBCUTANEOUS
Status: DISCONTINUED | OUTPATIENT
Start: 2017-05-05 | End: 2017-05-08 | Stop reason: HOSPADM

## 2017-05-05 RX ORDER — HEPARIN SODIUM 5000 [USP'U]/ML
5000 INJECTION, SOLUTION INTRAVENOUS; SUBCUTANEOUS EVERY 8 HOURS
Status: DISCONTINUED | OUTPATIENT
Start: 2017-05-05 | End: 2017-05-08

## 2017-05-05 RX ORDER — LORAZEPAM 2 MG/ML
1 CONCENTRATE ORAL
Qty: 30 ML | Refills: 0 | Status: SHIPPED | OUTPATIENT
Start: 2017-05-05 | End: 2017-05-08

## 2017-05-05 RX ADMIN — INSULIN LISPRO 4 UNITS: 100 INJECTION, SOLUTION INTRAVENOUS; SUBCUTANEOUS at 17:28

## 2017-05-05 RX ADMIN — Medication 10 ML: at 21:29

## 2017-05-05 RX ADMIN — POLYETHYLENE GLYCOL 3350 17 G: 17 POWDER, FOR SOLUTION ORAL at 09:29

## 2017-05-05 RX ADMIN — CYANOCOBALAMIN TAB 1000 MCG 1000 MCG: 1000 TAB at 09:28

## 2017-05-05 RX ADMIN — HEPARIN SODIUM 5000 UNITS: 5000 INJECTION, SOLUTION INTRAVENOUS; SUBCUTANEOUS at 21:29

## 2017-05-05 RX ADMIN — Medication 10 ML: at 14:33

## 2017-05-05 RX ADMIN — CLOPIDOGREL BISULFATE 75 MG: 75 TABLET ORAL at 09:28

## 2017-05-05 RX ADMIN — FERROUS SULFATE TAB 325 MG (65 MG ELEMENTAL FE) 325 MG: 325 (65 FE) TAB at 09:29

## 2017-05-05 RX ADMIN — ASPIRIN 81 MG CHEWABLE TABLET 81 MG: 81 TABLET CHEWABLE at 09:28

## 2017-05-05 RX ADMIN — ACETAMINOPHEN 650 MG: 325 TABLET ORAL at 17:35

## 2017-05-05 RX ADMIN — MELATONIN TAB 3 MG 3 MG: 3 TAB at 21:28

## 2017-05-05 RX ADMIN — DOCUSATE SODIUM 100 MG: 100 CAPSULE, LIQUID FILLED ORAL at 09:29

## 2017-05-05 RX ADMIN — AMLODIPINE BESYLATE 5 MG: 5 TABLET ORAL at 09:28

## 2017-05-05 RX ADMIN — ACETAMINOPHEN 325 MG: 325 TABLET ORAL at 14:21

## 2017-05-05 RX ADMIN — HEPARIN SODIUM 5000 UNITS: 5000 INJECTION, SOLUTION INTRAVENOUS; SUBCUTANEOUS at 12:00

## 2017-05-05 RX ADMIN — SIMVASTATIN 10 MG: 10 TABLET, FILM COATED ORAL at 21:29

## 2017-05-05 RX ADMIN — METHIMAZOLE 5 MG: 10 TABLET ORAL at 09:29

## 2017-05-05 RX ADMIN — INSULIN LISPRO 8 UNITS: 100 INJECTION, SOLUTION INTRAVENOUS; SUBCUTANEOUS at 14:00

## 2017-05-05 RX ADMIN — DOCUSATE SODIUM 100 MG: 100 CAPSULE, LIQUID FILLED ORAL at 17:30

## 2017-05-05 NOTE — PROGRESS NOTES
Follow up on patient that is on our service. Resting with eyes closed. Easily awakened by voice and touch. Repositioned for comfort and to be able to eat lunch. Set up lunch and patient ate 75% of Tuna [place] and bowl of broccoli/cheese soup and oranges. Drank Ice Tea and some ice water. Likes drinks extra cold. Length conversation regarding patient's medical condition and possible hospice upon discharge to [facility]. Patient voiced that that better helps her understand where she is in her disease process. Talked about hospice being for conditions where cure is not reasonable but maintaining quality and comfort is. She voiced that she would be open to speak with hospice and possibly obtain hospice services upon discharge. [facility], spoke with Nakita Coyle.  made aware of above and communication already with hospice. Also spoke with Mrs. Valente December regarding naming a medical power of . She currently has a son, no other family members but she is concerned due to her son's alcoholism that he would not be good to make decisions for her. She voiced that a friend of hers is the executor to her will. Encouraged her to think about who she would want to make her medical decisions for her. Will be available as needed for smooth transition to [facility] with Hospice.

## 2017-05-05 NOTE — PROGRESS NOTES
NUTRITION    Nutrition Consult: General Nutrition Management & Supplements      RECOMMENDATIONS / PLAN:     - Continue current nutrition interventions. - Continue RD inpatient monitoring and evaluation. NUTRITION INTERVENTIONS & DIAGNOSIS:     [x] Meals/Snacks: modified diet  [x] Medical food supplementation: Glucerna Shake TID  [x] Vitamin/mineral supplementation: cyanocobalamin, folic acid, ferrous sulfate     Nutrition Diagnosis: Underweight related to inadequate energy intake as evidenced by BMI of 17 kg/m^2. ASSESSMENT:     Subjective/Objective: Fair appetite, ate 100% of breakfast today.      Average po intake adequate to meet patients estimated nutritional needs:   [x] Yes     [] No   [] Unable to determine at this time    Diet: DIET DIABETIC CONSISTENT CARB Regular  DIET NUTRITIONAL SUPPLEMENTS All Meals; [address] Allergies: NKFA  Current Appetite:   [] Good     [x] Fair     [] Poor     [] Other:  Appetite/meal intake prior to admission:   [x] Good     [] Fair     [] Poor     [] Other:  Feeding Limitations:  [] Swallowing difficulty    [] Chewing difficulty    [] Other:  Current Meal Intake:   Patient Vitals for the past 100 hrs:   % Diet Eaten   05/05/17 0920 100 %     BM: 5/3    Skin Integrity: WDL  Edema: none   Pertinent Medications: Reviewed; NS at 50 mL/hr, colace, SSI, milk of magnesia, miralax     Recent Labs      05/04/17   0340  05/03/17   0405   NA  135*  135*   K  3.7  4.3   CL  99*  99*   CO2  28  26   GLU  250*  347*   BUN  26*  40*   CREA  0.91  1.41*   CA  8.4*  9.6       Intake/Output Summary (Last 24 hours) at 05/05/17 1330  Last data filed at 05/05/17 0920   Gross per 24 hour   Intake              958 ml   Output                2 ml   Net              956 ml       Anthropometrics:  Ht Readings from Last 1 Encounters:   05/04/17 5' 2\" (1.575 m)     Last 3 Recorded Weights in this Encounter    05/02/17 0336 05/05/17 0456   Weight: 44.5 kg (98 lb) 45.6 kg (100 lb 8 oz) Body mass index is 18.38 kg/(m^2).    Underweight        Weight History: current weight is 90 lb and previous weight was 98 lb last year per patient and family report (8 lb, 8% weight loss x year)     Weight Metrics 5/5/2017 3/22/2017 7/21/2016 5/25/2016 4/13/2016 1/19/2016 12/7/2015   Weight 100 lb 8 oz 100 lb 89 lb 92 lb 95 lb 93 lb 88 lb   BMI 18.38 kg/m2 18.29 kg/m2 16.27 kg/m2 16.82 kg/m2 17.37 kg/m2 17.01 kg/m2 16.09 kg/m2        Admitting Diagnosis: STEMI (ST elevation myocardial infarction) ([facility])  STEMI (ST elevation myocardial infarction) ([facility])  Past Medical History:   Diagnosis Date    Abnormality of gait     Acute upper respiratory infections of unspecified site     Broken shoulder     right    Chest pain     Chronic airway obstruction, not elsewhere classified     COPD     Diabetes [facility] [address]     Essential hypertension, benign     Hearing loss     History of tachycardia     possible SVT    Hypercholesterolemia     Hyperlipidemia     Hypertension     Incontinence     Mitral valve prolapse     MVP (mitral valve prolapse)     OAB (overactive bladder)     Osteoarthritis     spine    Osteoporosis     Pure hypercholesterolemia     Senile osteoporosis     Thyroid disease     Thyrotoxicosis     Type II or unspecified type diabetes mellitus without mention of complication, not stated as uncontrolled     Urinary tract infection, site not specified        Education Needs:        [x] None identified  [] Identified - Not appropriate at this time  []  Identified and addressed - refer to education log  Learning Limitations:   [x] None identified  [] Identified    Cultural, Jew & ethnic food preferences:  [x] None identified    [] Identified and addressed     ESTIMATED NUTRITION NEEDS:     Calories: 7953-4572 kcal (MSJx1.2-1.3) based on  [] Actual BW      [x] IBW 50 kg  CHO: 130-141 gm (50% kcal)   Protein: 40-50 gm (0.8-1 gm/kg) based on  [] Actual BW      [x] IBW   Fluid: 1 mL/kcal MONITORING & EVALUATION:     Nutrition Goal(s):   1. Po intake of meals will meet >75% of patient estimated nutritional needs within the next 7 days.   Outcome:  [x] Met/Ongoing    []  Not Met    [] New/Initial Goal     Monitoring:   [x] Diet tolerance   [x] Meal intake   [x] Supplement intake   [] GI symptoms/ability to tolerate po diet   [] Respiratory status   [] Plan of care      Previous Recommendations (for follow-up assessments only):     [x]   Implemented       []   Not Implemented (RD to address)     [] No Recommendation Made     Discharge Planning: diabetic diet   [x] Participated in care planning, discharge planning, & interdisciplinary rounds as appropriate      Vicente Wallace, [address]    Pager: [phone]

## 2017-05-05 NOTE — PROGRESS NOTES
Cardiovascular Specialists - Progress Note  Admit Date: 5/2/2017    Assessment:     Hospital Problems  Date Reviewed: 7/23/2016          Codes Class Noted POA    STEMI (ST elevation myocardial infarction) [facility]) ICD-10-CM: I21.3  ICD-9-CM: 410.90  5/3/2017 Unknown               - Acute inferior STEMI, patient has opted for conservative medical management only. Troponin peak 83. EF 55-60%. - Intermittent junctional Bradycardia, patient wishes for conservative therapy. - BURT  - H/O HTN  - Recent history of traumatic subdural hematoma (08/16): requiring craniotomy and then again [place] holes at [facility]  - Lethargy and drowsiness  - DM  - HLD  - Advance age and co-morbidities  - Renal mass  - UBRT  - Mild AS  - H/o subdural hematoma  - Constipation      Previously followed by Dr. Donovan Jones:     No recurrent CP. Rhythm and hemodynamics overall stable. Will continue conservative cardiac management. Continued on ASA, plavix, although noted slight decline in hgb, would follow. Continue statin. No BB due to bradycardia. Volume status appears stable. Subjective:     No new complaints.      Objective:      Patient Vitals for the past 8 hrs:   Temp Pulse Resp BP SpO2   05/05/17 1205 98.4 °F (36.9 °C) 75 18 136/67 95 %   05/05/17 0758 98.4 °F (36.9 °C) 69 18 125/70 95 %   05/05/17 0456 97.5 °F (36.4 °C) 72 18 126/68 93 %         Patient Vitals for the past 96 hrs:   Weight   05/05/17 0456 45.6 kg (100 lb 8 oz)   05/02/17 0336 44.5 kg (98 lb)                    Intake/Output Summary (Last 24 hours) at 05/05/17 1213  Last data filed at 05/05/17 0920   Gross per 24 hour   Intake              958 ml   Output                2 ml   Net              956 ml       Physical Exam:  General:  alert, cooperative, no distress, appears stated age  Neck:  no JVD  Lungs:  clear to auscultation bilaterally  Heart:  regular rate and rhythm  Abdomen:  abdomen is soft without significant tenderness, masses, organomegaly or guarding  Extremities:  extremities normal, atraumatic, no cyanosis or edema    Data Review:     Labs: Results:       Chemistry Recent Labs      05/04/17   0340  05/03/17   0405   GLU  250*  347*   NA  135*  135*   K  3.7  4.3   CL  99*  99*   CO2  28  26   BUN  26*  40*   CREA  0.91  1.41*   CA  8.4*  9.6   AGAP  8  10   BUCR  29*  28*      CBC w/Diff Recent Labs      05/05/17   0340  05/04/17   0340  05/03/17   0405   WBC  6.6  9.3  11.4   RBC  2.90*  2.77*  3.29*   HGB  9.6*  10.0*  11.1*   HCT  26.9*  26.0*  30.3*   PLT  170  198  229   GRANS  73  76*  84*   LYMPH  9*  8*  7*   EOS  0  0  0      Cardiac Enzymes No results found for: CPK, CKMMB, CKMB, RCK3, CKMBT, CKNDX, CKND1, YASIR, TROPT, TROIQ, MUSA, TROPT, TNIPOC, BNP, BNPP   Coagulation Recent Labs      05/04/17   1005  05/04/17   0340   05/02/17   1730   PTP   --    --    --   13.2   INR   --    --    --   1.0   APTT  32.5  46.9*   < >  47.0*    < > = values in this interval not displayed. Lipid Panel Lab Results   Component Value Date/Time    Cholesterol, total 193 05/03/2017 04:05 AM    HDL Cholesterol 93 05/03/2017 04:05 AM    LDL, calculated 89.2 05/03/2017 04:05 AM    VLDL, calculated 10.8 05/03/2017 04:05 AM    Triglyceride 54 05/03/2017 04:05 AM    CHOL/HDL Ratio 2.1 05/03/2017 04:05 AM      BNP No results found for: BNP, BNPP, XBNPT   Liver Enzymes No results for input(s): TP, ALB, TBIL, AP, SGOT, GPT in the last 72 hours.     No lab exists for component: DBIL   Digoxin    Thyroid Studies Lab Results   Component Value Date/Time    TSH 10.70 05/02/2017 10:58 AM          Signed By: BÁRBARA Hampton     May 5, 2017

## 2017-05-05 NOTE — HOSPICE
Hospice referral received. Chart review/pt eval in progress. Will update after chart review is complete. Thank you for the referral.  Please contact [phone] with any questions or concerns regarding this patient. 1500  At bedside to discuss hospice referral w/patient, family members at the bedside. Pt stated immediately that she has \"changed her mind,\" and does not want hospice services. She stated that she has spoken to someone at [facility], where she resides, who told her she is not ready for hospice. Pt stated that she would instead like to receive 21 days of therapy to help her get her strength back. Stated to pt that I would let CM know the outcome of hospice discussion. [facility] updated. Informational brochure left for pt at the bedside.

## 2017-05-05 NOTE — PROGRESS NOTES
Problem: Mobility Impaired (Adult and Pediatric)  Goal: *Acute Goals and Plan of Care (Insert Text)  Physical Therapy Goals  Initiated 5/5/2017 and to be accomplished within 7 day(s): 5/12/2017  1. Patient will move from supine to sit and sit to supine in bed with modified independence. 2. Patient will transfer from bed to chair and chair to bed with modified independence using the least restrictive device. 3. Patient will perform sit to stand with modified independence. 4. Patient will ambulate with w/c with minimal assistance/contact guard assist for 50 feet using bilat UE and bilat LE for propulsion. PHYSICAL THERAPY EVALUATION     Patient: Lyubov Ron (14 y.o. female)  Date: 5/5/2017  Primary Diagnosis: STEMI (ST elevation myocardial infarction) ([facility] [address]  STEMI (ST elevation myocardial infarction) [facility])        Precautions:  Fall      ASSESSMENT :  Pt is 79 yo female admitted for NSTEMI seen today for functional mobility evaluation. Pt seen in supine c/o 7/10 neck pain. Pt repositioned for comfort and stated that after positioning she felt better but still rated pain 7/10. Based on the objective data described below, the patient presents with LE weakness, decreased cervical CROM due to pain in flex/ext and L/R rotation, and decreased activity tolerance resulting in impaired functional mobility. Evaluation was completed at bed level today secondary to cervical pain and pt on nitro drip. Patient will benefit from skilled intervention to address the above impairments.   Patients rehabilitation potential is considered to be Fair  Factors which may influence rehabilitation potential include:   [ ]         None noted  [ ]         Mental ability/status  [X]         Medical condition  [ ]         Home/family situation and support systems  [ ]         Safety awareness  [ ]         Pain tolerance/management  [ ]         Other:        PLAN :  Recommendations and Planned Interventions:  [X]           Bed Mobility Training             [X]    Neuromuscular Re-Education  [X]           Transfer Training                   [ ]    Orthotic/Prosthetic Training  [X]           Gait Training                          [ ]    Modalities  [X]           Therapeutic Exercises          [ ]    Edema Management/Control  [X]           Therapeutic Activities            [X]    Patient and Family Training/Education  [ ]           Other (comment):     Frequency/Duration: Patient will be followed by physical therapy 1-2 times per day/4-7 days per week to address goals. Discharge Recommendations: Sebastien Fontaine  Further Equipment Recommendations for Discharge: N/A       G-CODES      Mobility  Current  CL= 60-79%   Goal  CJ= 20-39%. The severity rating is based on the Level of Assistance required for Functional Mobility and ADLs.            G-CODES      Eval Complexity: History: HIGH Complexity :3+ comorbidities / personal factors will impact the outcome/ POC Exam:MEDIUM Complexity : 3 Standardized tests and measures addressing body structure, function, activity limitation and / or participation in recreation  Presentation: MEDIUM Complexity : Evolving with changing characteristics  Clinical Decision Making:Medium Complexity Overall Complexity:MEDIUM      SUBJECTIVE:   Patient stated my neck hurts.       OBJECTIVE DATA SUMMARY:       Past Medical History:   Diagnosis Date    Abnormality of gait      Acute upper respiratory infections of unspecified site      Broken shoulder       right    Chest pain      Chronic airway obstruction, not elsewhere classified      COPD      Diabetes ([facility] [address]      Essential hypertension, benign      Hearing loss      History of tachycardia       possible SVT    Hypercholesterolemia      Hyperlipidemia      Hypertension      Incontinence      Mitral valve prolapse      MVP (mitral valve prolapse)      OAB (overactive bladder)      Osteoarthritis       spine    Osteoporosis      Pure hypercholesterolemia      Senile osteoporosis      Thyroid disease      Thyrotoxicosis      Type II or unspecified type diabetes mellitus without mention of complication, not stated as uncontrolled      Urinary tract infection, site not specified       Past Surgical History:   Procedure Laterality Date    HX KNEE REPLACEMENT   left    HX OPEN REDUCTION INTERNAL FIXATION Left       patellar fracture    HX OTHER SURGICAL Right       shoulder surgery    REMV CATARACT EXTRACAP,INSERT LENS,COMP   [id number]     bilateral     Prior Level of Function/Home Situation: pt lives at [facility]. Ambulates via w/c, able to self-propel limited distances. Bed<>chair transfers [address]: [facility] Name: Susie  One/Two Story Residence: One story  Living Alone: No  Support Systems: Skilled nursing facility  Patient Expects to be Discharged to[de-identified] Skilled nursing facility  Current DME Used/Available at Home: Wheelchair  Critical Behavior:  Neurologic State: Alert  Orientation Level: Oriented X4  Cognition: Appropriate for age attention/concentration; Follows commands  Safety/Judgement: Awareness of environment; Fall prevention  Psychosocial  Patient Behaviors: Calm; Cooperative  Strength:    Strength: Generally decreased, functional (Bilat LE)  Tone & Sensation:   Tone: Normal (Bilat LE)  Sensation: Intact (Bilat LE: Light Touch)  Range Of Motion:  AROM: Within functional limits (Bilat LE)  PROM: Within functional limits (Bilat LE)  Functional Mobility:  Bed Mobility:  Rolling: Modified independent  Therapeutic Exercises:   Bed Mobility for repositioning   Pain:  Pt reports 7/10 pain or discomfort prior to treatment. Pt reports 7/10 pain or discomfort post treatment. Cervical pain  Activity Tolerance:   Fair - pain, fatigue  Please refer to the flowsheet for vital signs taken during this treatment.   After treatment:   [ ]         Patient left in no apparent distress sitting up in chair  [X]         Patient left in no apparent distress in bed  [X]         Call bell left within reach  [ ]         Nursing notified  [ ]         Caregiver present  [ ]         Bed alarm activated      COMMUNICATION/EDUCATION:   [X]         Fall prevention education was provided and the patient/caregiver indicated understanding. [X]         Patient/family have participated as able in goal setting and plan of care. [X]         Patient/family agree to work toward stated goals and plan of care. [ ]         Patient understands intent and goals of therapy, but is neutral about his/her participation. [ ]         Patient is unable to participate in goal setting and plan of care.      Thank you for this referral.  Lacy Wilde, PT, DPT   Time Calculation: 23 mins

## 2017-05-05 NOTE — PROGRESS NOTES
Patient has hospice order. Called [facility], made referral to [facility], she stated Alan Landeros will be in the hospital to see the patient. I called [phone], spoke to Mrs. Dooley, she stated patient can come to the facility as hospice. She stated they prefer [facility]. She also asked to make sure we explain to the patient what hospice all about, because patient Jessica Browne likes to go to her medical appointments, mostly to her eye doctor\". Mrs. Smart stated she is going to vacation, with any questions we need to contact Mrs. Quiros Vasquez, or Don Scottyaamir Welsh. 3:15 PM Rosa- hospice liaison stopped by. She just spoke to the patient, she says, patient refused hospice, she wants to attend rehab instead.

## 2017-05-06 LAB
ANION GAP BLD CALC-SCNC: 6 MMOL/L (ref 3–18)
BASOPHILS # BLD AUTO: 0 K/UL (ref 0–0.1)
BASOPHILS # BLD: 0 % (ref 0–2)
BUN SERPL-MCNC: 30 MG/DL (ref 7–18)
BUN/CREAT SERPL: 39 (ref 12–20)
CALCIUM SERPL-MCNC: 8 MG/DL (ref 8.5–10.1)
CHLORIDE SERPL-SCNC: 104 MMOL/L (ref 100–108)
CO2 SERPL-SCNC: 26 MMOL/L (ref 21–32)
CREAT SERPL-MCNC: 0.76 MG/DL (ref 0.6–1.3)
DIFFERENTIAL METHOD BLD: ABNORMAL
EOSINOPHIL # BLD: 0.1 K/UL (ref 0–0.4)
EOSINOPHIL NFR BLD: 1 % (ref 0–5)
ERYTHROCYTE [DISTWIDTH] IN BLOOD BY AUTOMATED COUNT: 13.6 % (ref 11.6–14.5)
GLUCOSE BLD STRIP.AUTO-MCNC: 128 MG/DL (ref 70–110)
GLUCOSE BLD STRIP.AUTO-MCNC: 145 MG/DL (ref 70–110)
GLUCOSE BLD STRIP.AUTO-MCNC: 169 MG/DL (ref 70–110)
GLUCOSE BLD STRIP.AUTO-MCNC: 205 MG/DL (ref 70–110)
GLUCOSE BLD STRIP.AUTO-MCNC: 285 MG/DL (ref 70–110)
GLUCOSE SERPL-MCNC: 111 MG/DL (ref 74–99)
HCT VFR BLD AUTO: 26.8 % (ref 35–45)
HGB BLD-MCNC: 9.5 G/DL (ref 12–16)
LYMPHOCYTES # BLD AUTO: 12 % (ref 21–52)
LYMPHOCYTES # BLD: 0.6 K/UL (ref 0.9–3.6)
MCH RBC QN AUTO: 33.1 PG (ref 24–34)
MCHC RBC AUTO-ENTMCNC: 35.4 G/DL (ref 31–37)
MCV RBC AUTO: 93.4 FL (ref 74–97)
MONOCYTES # BLD: 0.9 K/UL (ref 0.05–1.2)
MONOCYTES NFR BLD AUTO: 17 % (ref 3–10)
NEUTS SEG # BLD: 3.7 K/UL (ref 1.8–8)
NEUTS SEG NFR BLD AUTO: 70 % (ref 40–73)
PLATELET # BLD AUTO: 184 K/UL (ref 135–420)
PMV BLD AUTO: 10.6 FL (ref 9.2–11.8)
POTASSIUM SERPL-SCNC: 3.6 MMOL/L (ref 3.5–5.5)
RBC # BLD AUTO: 2.87 M/UL (ref 4.2–5.3)
SODIUM SERPL-SCNC: 136 MMOL/L (ref 136–145)
WBC # BLD AUTO: 5.3 K/UL (ref 4.6–13.2)

## 2017-05-06 PROCEDURE — 65660000000 HC RM CCU STEPDOWN

## 2017-05-06 PROCEDURE — 74011250637 HC RX REV CODE- 250/637: Performed by: INTERNAL MEDICINE

## 2017-05-06 PROCEDURE — 97530 THERAPEUTIC ACTIVITIES: CPT

## 2017-05-06 PROCEDURE — 36415 COLL VENOUS BLD VENIPUNCTURE: CPT | Performed by: FAMILY MEDICINE

## 2017-05-06 PROCEDURE — 97165 OT EVAL LOW COMPLEX 30 MIN: CPT

## 2017-05-06 PROCEDURE — 82962 GLUCOSE BLOOD TEST: CPT

## 2017-05-06 PROCEDURE — 85025 COMPLETE CBC W/AUTO DIFF WBC: CPT | Performed by: FAMILY MEDICINE

## 2017-05-06 PROCEDURE — 74011250637 HC RX REV CODE- 250/637: Performed by: NURSE PRACTITIONER

## 2017-05-06 PROCEDURE — 74011250637 HC RX REV CODE- 250/637: Performed by: PHYSICIAN ASSISTANT

## 2017-05-06 PROCEDURE — 74011636637 HC RX REV CODE- 636/637: Performed by: FAMILY MEDICINE

## 2017-05-06 PROCEDURE — 74011250636 HC RX REV CODE- 250/636: Performed by: FAMILY MEDICINE

## 2017-05-06 PROCEDURE — 80048 BASIC METABOLIC PNL TOTAL CA: CPT | Performed by: FAMILY MEDICINE

## 2017-05-06 PROCEDURE — 74011250637 HC RX REV CODE- 250/637: Performed by: FAMILY MEDICINE

## 2017-05-06 RX ADMIN — INSULIN LISPRO 6 UNITS: 100 INJECTION, SOLUTION INTRAVENOUS; SUBCUTANEOUS at 12:00

## 2017-05-06 RX ADMIN — CYANOCOBALAMIN TAB 1000 MCG 1000 MCG: 1000 TAB at 09:44

## 2017-05-06 RX ADMIN — ACETAMINOPHEN 650 MG: 325 TABLET ORAL at 09:44

## 2017-05-06 RX ADMIN — METHIMAZOLE 5 MG: 10 TABLET ORAL at 09:44

## 2017-05-06 RX ADMIN — POLYETHYLENE GLYCOL 3350 17 G: 17 POWDER, FOR SOLUTION ORAL at 09:42

## 2017-05-06 RX ADMIN — AMLODIPINE BESYLATE 5 MG: 5 TABLET ORAL at 09:44

## 2017-05-06 RX ADMIN — HEPARIN SODIUM 5000 UNITS: 5000 INJECTION, SOLUTION INTRAVENOUS; SUBCUTANEOUS at 05:19

## 2017-05-06 RX ADMIN — SIMVASTATIN 10 MG: 10 TABLET, FILM COATED ORAL at 21:02

## 2017-05-06 RX ADMIN — CLOPIDOGREL BISULFATE 75 MG: 75 TABLET ORAL at 09:45

## 2017-05-06 RX ADMIN — ASPIRIN 81 MG CHEWABLE TABLET 81 MG: 81 TABLET CHEWABLE at 09:44

## 2017-05-06 RX ADMIN — DOCUSATE SODIUM 100 MG: 100 CAPSULE, LIQUID FILLED ORAL at 09:45

## 2017-05-06 RX ADMIN — HEPARIN SODIUM 5000 UNITS: 5000 INJECTION, SOLUTION INTRAVENOUS; SUBCUTANEOUS at 20:57

## 2017-05-06 RX ADMIN — HEPARIN SODIUM 5000 UNITS: 5000 INJECTION, SOLUTION INTRAVENOUS; SUBCUTANEOUS at 12:38

## 2017-05-06 RX ADMIN — ACETAMINOPHEN 650 MG: 325 TABLET ORAL at 17:38

## 2017-05-06 RX ADMIN — SODIUM CHLORIDE 50 ML/HR: 900 INJECTION, SOLUTION INTRAVENOUS at 05:29

## 2017-05-06 RX ADMIN — Medication 10 ML: at 13:55

## 2017-05-06 RX ADMIN — Medication 10 ML: at 21:01

## 2017-05-06 RX ADMIN — INSULIN LISPRO 2 UNITS: 100 INJECTION, SOLUTION INTRAVENOUS; SUBCUTANEOUS at 17:38

## 2017-05-06 RX ADMIN — FERROUS SULFATE TAB 325 MG (65 MG ELEMENTAL FE) 325 MG: 325 (65 FE) TAB at 09:44

## 2017-05-06 RX ADMIN — Medication 10 ML: at 07:03

## 2017-05-06 RX ADMIN — DOCUSATE SODIUM 100 MG: 100 CAPSULE, LIQUID FILLED ORAL at 17:37

## 2017-05-06 NOTE — ROUTINE PROCESS
Received report from [address] [place] [address] St. Pt AAOx3, NAD, breathing non labored, on O2 NC at 2L, HOB up. Visitors at the bedside. NTG drip going at 1.5 cc/h. IVF going per order. Bed at the lowest level on lock position, call bell w/i reach. Bedside and Verbal shift change report given to JOHANNA Ruvalcaba (oncoming nurse) by me (offgoing nurse).  Report included the following information SBAR, Kardex, Intake/Output, MAR, Recent Results and Cardiac Rhythm SA.

## 2017-05-06 NOTE — PROGRESS NOTES
Problem: Self Care Deficits Care Plan (Adult)  Goal: *Acute Goals and Plan of Care (Insert Text)  Occupational Therapy Goals  Initiated 5/6/2017 within 7 day(s). 1. Patient will perform lower body dressing with supervision/set-up. 2. Patient will perform functional task in standing for 5 minutes with supervision for balance. 3. Patient will perform toilet transfers with supervision/set-up. 4. Patient will perform all aspects of toileting with supervision/set-up. 5. Patient will participate in upper extremity therapeutic exercise/activities with supervision/set-up for 8 minutes to increase strength/endurance for ADLs. Outcome: Progressing Towards Goal  OCCUPATIONAL THERAPY EVALUATION     Patient: Marga Jaeger (4 y.o. female)  Date: 5/6/2017  Primary Diagnosis: STEMI (ST elevation myocardial infarction) ([facility] [address]  STEMI (ST elevation myocardial infarction) [facility])        Precautions:   Fall      ASSESSMENT :  Based on the objective data described below, the patient presents with decreased LB ADLs, decreased functional mobility and muscle weakness/poor endurance. Patient able to reach her LEs for self care but requires extra time and assist to reach toes. Min assist given for functional standing and transfers. She was educated on energy conservation techniques with self care and verbalized understanding. Patient in chair at end of session. Nursing assistant notified and level of assist written on whiteboard in room. Recommend continued therapy at SNF to maximize patient's independence for safe return to home. Patient will benefit from skilled intervention to address the above impairments.   Patients rehabilitation potential is considered to be Good  Factors which may influence rehabilitation potential include:   [ ]             None noted  [ ]             Mental ability/status  [X]             Medical condition  [ ]             Home/family situation and support systems  [ ]             Safety awareness  [ ]             Pain tolerance/management  [ ]             Other:        PLAN :  Recommendations and Planned Interventions:  [X]               Self Care Training                  [X]        Therapeutic Activities  [X]               Functional Mobility Training    [ ]        Cognitive Retraining  [X]               Therapeutic Exercises           [X]        Endurance Activities  [X]               Balance Training                   [ ]        Neuromuscular Re-Education  [ ]               Visual/Perceptual Training     [X]   Home Safety Training  [X]               Patient Education                 [X]        Family Training/Education  [ ]               Other (comment):     Frequency/Duration: Patient will be followed by occupational therapy 1-2 times per day/4-7 days per week to address goals. Discharge Recommendations: Sebastien Fontaine  Further Equipment Recommendations for Discharge: N/A       PATIENT COMPLEXITY      Eval Complexity: History: LOW Complexity : Brief history review ; Examination: LOW Complexity : 1-3 performance deficits relating to physical, cognitive , or psychosocial skils that result in activity limitations and / or participation restrictions ; Decision Making:LOW Complexity : No comorbidities that affect functional and no verbal or physical assistance needed to complete eval tasks  Assessment: Low Complexity       G-CODES:      Self Care  Current  CJ= 20-39%   Goal  CI= 1-19%. The severity rating is based on the Level of Assistance required for Functional Mobility and ADLs.       SUBJECTIVE:   Patient stated I don't know why they given a diabetic this food for breakfast.      OBJECTIVE DATA SUMMARY:       Past Medical History:   Diagnosis Date    Abnormality of gait      Acute upper respiratory infections of unspecified site      Broken shoulder       right    Chest pain      Chronic airway obstruction, not elsewhere classified      COPD      Diabetes ([facility] [address]  Essential hypertension, benign      Hearing loss      History of tachycardia       possible SVT    Hypercholesterolemia      Hyperlipidemia      Hypertension      Incontinence      Mitral valve prolapse      MVP (mitral valve prolapse)      OAB (overactive bladder)      Osteoarthritis       spine    Osteoporosis      Pure hypercholesterolemia      Senile osteoporosis      Thyroid disease      Thyrotoxicosis      Type II or unspecified type diabetes mellitus without mention of complication, not stated as uncontrolled      Urinary tract infection, site not specified       Past Surgical History:   Procedure Laterality Date    HX KNEE REPLACEMENT   left    HX OPEN REDUCTION INTERNAL FIXATION Left       patellar fracture    HX OTHER SURGICAL Right       shoulder surgery    REMV CATARACT EXTRACAP,INSERT LENS,COMP   [id number]     bilateral     Prior Level of Function/Home Situation: Pt was modified independent with basic self care tasks and used a RW/wheelchair for functional mobility PTA. Home Situation  Home Environment: [address] [place] [address] Name: Susie  One/Two Story Residence: One story  Living Alone: No  Support Systems: Skilled nursing facility  Patient Expects to be Discharged to[de-identified] Skilled nursing facility  Current DME Used/Available at Home: Wheelchair  Tub or Shower Type: Shower  [X]  Right hand dominant          [ ]  Left hand dominant  Cognitive/Behavioral Status:  Neurologic State: Alert  Orientation Level: Oriented X4  Cognition: Follows commands  Safety/Judgement: Awareness of environment; Fall prevention     Skin: Intact on UEs     Edema: None noted in UEs     Vision/Perceptual:    Acuity: Within Defined Limits       Coordination:  Fine Motor Skills-Upper: Left Intact; Right Intact    Gross Motor Skills-Upper: Left Intact; Right Intact     Balance:  Sitting: Intact  Standing: With support     Strength:  Strength: Generally decreased, functional (UEs; 3+/5 throughout)     Tone & Sensation:  Tone: Normal (UEs)  Sensation: Intact (UEs)     Range of Motion:  AROM: Within functional limits (UEs)  PROM: Within functional limits (UEs)     Functional Mobility and Transfers for ADLs:  Bed Mobility:  Supine to Sit: Minimum assistance  Transfers:  Sit to Stand: Minimum assistance              Toilet Transfer : Minimum assistance     ADL Assessment:  Feeding: Setup     Oral Facial Hygiene/Grooming: Setup     Bathing: Minimum assistance     Upper Body Dressing: Setup     Lower Body Dressing: Minimum assistance     Toileting: Supervision; total assist for bladder secondary to catheter     Pain:  Pt reports 3/10 pain or discomfort prior to treatment, in neck. Pt reports 3/10 pain or discomfort post treatment, in neck. Patient resting in chair at end of session. Activity Tolerance:   Good     Please refer to the flowsheet for vital signs taken during this treatment. After treatment:   [X] Patient left in no apparent distress sitting up in chair  [ ] Patient left in no apparent distress in bed  [X] Call bell left within reach  [X] Nursing notified  [ ] Caregiver present  [ ] Bed alarm activated      COMMUNICATION/EDUCATION:   [X] Home safety education was provided and the patient/caregiver indicated understanding. [X] Patient/family have participated as able in goal setting and plan of care. [X] Patient/family agree to work toward stated goals and plan of care. [ ] Patient understands intent and goals of therapy, but is neutral about his/her participation. [ ] Patient is unable to participate in goal setting and plan of care.      Thank you for this referral.  Jimenez Wyman MS OTR/L  Time Calculation: 25 mins

## 2017-05-06 NOTE — ROUTINE PROCESS
Received report from [address] Pt AAOx3, NAD, breathing non labored, on O2 NC at 2L, HOB up. IVF going per order. NTG drip decreased to 5mcg/min-pt w/ order to wean off from said med. Bed at the lowest level on lock position, call bell w/i reach. Bedside and Verbal shift change report given to [facility] (oncoming nurse) by me (offgoing nurse).  Report included the following information SBAR, Kardex, Intake/Output, MAR, Recent Results and Cardiac Rhythm SA.

## 2017-05-06 NOTE — PROGRESS NOTES
Saw the patient alone in room, we discussed hospice order. Patient being AAO x 4 stated, she does NOT want to proceed with hospice, but would like to attend snf. Patient was provided SNF list, she chose [facility] and singed FOC form, original placed to paper light chart; [facility] to [facility]. Will continue to follow.

## 2017-05-06 NOTE — PROGRESS NOTES
[facility]  [facility]    Patient: Ju Parr Age: 80 y.o. : 1926 MR#: [id number] SSN: [id number]  Date/Time: 2017 10:55 AM    Subjective:     Denies F/C, N/V, CP, SOB. Case d/w case mgmt. Pt understands plan for return to SNF. Plan for xfer Monday, they do not accept xfers over weekend. Assessment/Plan:   1. Acute inf STEMI - declines intervention. Med tx. Echo with EF 55-60%, no obvious wall motion abnml. ASA, Plavix, statin. No BBlocker given significant bradycardia earlier with hx intermittent jxnl bradycardia. Off heparin gtt, wean NTG gtt as tolerated. Pt wishes for conservative mgmt. No new issues. No CP c/o.  2. Constipation - as above. Bowel regimen. No new issues. 3. HTN - BPs wnl.   4. DM - by hx. Holding Amaryl. Check HgbA1c. SSI. 5. Hyperlipidemia - statin. 6. COPD - no acute. 7. BURT, dehydration - prerenal. Resolved with fluids. 8. Hx thyrotoxicosis with goiter - methimazole. TSH noted. 9. Hx chronic anemia due iron deficiency - baseline Hgb 10-12. Slight drop from admission. Off heparin gtt. Is Fe-def, supplementing. Hemodynamically stable. H/H stable. 10. Pt is underweight - supplement. Nutrition consult. 11. Chart reviewed. DNR.      Additional Notes:      Case discussed with:  [x]Patient  []Family  []Nursing  [x]Case Management  DVT Prophylaxis:  []Lovenox  [x]Hep SQ  []SCDs  []Coumadin   []On Heparin gtt    Objective:   VS:   Visit Vitals    BP 99/64 (BP 1 Location: Right arm, BP Patient Position: At rest)    Pulse 66    Temp 97.7 °F (36.5 °C)    Resp 18    Ht 5' 2\" (1.575 m)    Wt 45.4 kg (100 lb)    SpO2 98%    BMI 18.29 kg/m2      Tmax/24hrs: Temp (24hrs), Av.7 °F (36.5 °C), Min:97.5 °F (36.4 °C), Max:98.1 °F (36.7 °C)      Intake/Output Summary (Last 24 hours) at 17 1703  Last data filed at 17 1320   Gross per 24 hour   Intake             1373 ml   Output              350 ml   Net 1023 ml       General:  Awake, alert, NAD, in chair. Cardiovascular:  RRR. Pulmonary:  CTA B.  GI:  Soft, NT/ND, NABS. Extremities:  No CT or edema. Additional:      Labs:    Recent Results (from the past 24 hour(s))   GLUCOSE, POC    Collection Time: 05/05/17  5:20 PM   Result Value Ref Range    Glucose (POC) 243 (H) 70 - 110 mg/dL   GLUCOSE, POC    Collection Time: 05/05/17  9:39 PM   Result Value Ref Range    Glucose (POC) 124 (H) 70 - 110 mg/dL   CBC WITH AUTOMATED DIFF    Collection Time: 05/06/17  3:00 AM   Result Value Ref Range    WBC 5.3 4.6 - 13.2 K/uL    RBC 2.87 (L) 4.20 - 5.30 M/uL    HGB 9.5 (L) 12.0 - 16.0 g/dL    HCT 26.8 (L) 35.0 - 45.0 %    MCV 93.4 74.0 - 97.0 FL    MCH 33.1 24.0 - 34.0 PG    MCHC 35.4 31.0 - 37.0 g/dL    RDW 13.6 11.6 - 14.5 %    PLATELET 715 515 - 704 K/uL    MPV 10.6 9.2 - 11.8 FL    NEUTROPHILS 70 40 - 73 %    LYMPHOCYTES 12 (L) 21 - 52 %    MONOCYTES 17 (H) 3 - 10 %    EOSINOPHILS 1 0 - 5 %    BASOPHILS 0 0 - 2 %    ABS. NEUTROPHILS 3.7 1.8 - 8.0 K/UL    ABS. LYMPHOCYTES 0.6 (L) 0.9 - 3.6 K/UL    ABS. MONOCYTES 0.9 0.05 - 1.2 K/UL    ABS. EOSINOPHILS 0.1 0.0 - 0.4 K/UL    ABS.  BASOPHILS 0.0 0.0 - 0.1 K/UL    DF AUTOMATED     METABOLIC PANEL, BASIC    Collection Time: 05/06/17  3:00 AM   Result Value Ref Range    Sodium 136 136 - 145 mmol/L    Potassium 3.6 3.5 - 5.5 mmol/L    Chloride 104 100 - 108 mmol/L    CO2 26 21 - 32 mmol/L    Anion gap 6 3.0 - 18 mmol/L    Glucose 111 (H) 74 - 99 mg/dL    BUN 30 (H) 7.0 - 18 MG/DL    Creatinine 0.76 0.6 - 1.3 MG/DL    BUN/Creatinine ratio 39 (H) 12 - 20      GFR est AA >60 >60 ml/min/1.73m2    GFR est non-AA >60 >60 ml/min/1.73m2    Calcium 8.0 (L) 8.5 - 10.1 MG/DL   GLUCOSE, POC    Collection Time: 05/06/17  7:22 AM   Result Value Ref Range    Glucose (POC) 128 (H) 70 - 110 mg/dL   GLUCOSE, POC    Collection Time: 05/06/17 11:59 AM   Result Value Ref Range    Glucose (POC) 285 (H) 70 - 110 mg/dL   GLUCOSE, POC    Collection Time: 05/06/17  3:23 PM   Result Value Ref Range    Glucose (POC) 169 (H) 70 - 110 mg/dL       Signed By: Anusha Cuello MD     May 6, 2017 10:55 AM

## 2017-05-07 LAB
GLUCOSE BLD STRIP.AUTO-MCNC: 115 MG/DL (ref 70–110)
GLUCOSE BLD STRIP.AUTO-MCNC: 119 MG/DL (ref 70–110)
GLUCOSE BLD STRIP.AUTO-MCNC: 251 MG/DL (ref 70–110)
GLUCOSE BLD STRIP.AUTO-MCNC: 286 MG/DL (ref 70–110)
GLUCOSE BLD STRIP.AUTO-MCNC: 362 MG/DL (ref 70–110)

## 2017-05-07 PROCEDURE — 74011250637 HC RX REV CODE- 250/637: Performed by: PHYSICIAN ASSISTANT

## 2017-05-07 PROCEDURE — 77010033678 HC OXYGEN DAILY

## 2017-05-07 PROCEDURE — 82962 GLUCOSE BLOOD TEST: CPT

## 2017-05-07 PROCEDURE — 74011250637 HC RX REV CODE- 250/637: Performed by: NURSE PRACTITIONER

## 2017-05-07 PROCEDURE — 74011250637 HC RX REV CODE- 250/637: Performed by: INTERNAL MEDICINE

## 2017-05-07 PROCEDURE — 74011250636 HC RX REV CODE- 250/636: Performed by: FAMILY MEDICINE

## 2017-05-07 PROCEDURE — 74011250637 HC RX REV CODE- 250/637: Performed by: FAMILY MEDICINE

## 2017-05-07 PROCEDURE — 65660000000 HC RM CCU STEPDOWN

## 2017-05-07 PROCEDURE — 74011636637 HC RX REV CODE- 636/637: Performed by: FAMILY MEDICINE

## 2017-05-07 RX ORDER — DEXTROMETHORPHAN POLISTIREX 30 MG/5 ML
SUSPENSION, EXTENDED RELEASE 12 HR ORAL AS NEEDED
Status: DISCONTINUED | OUTPATIENT
Start: 2017-05-07 | End: 2017-05-08 | Stop reason: HOSPADM

## 2017-05-07 RX ADMIN — HEPARIN SODIUM 5000 UNITS: 5000 INJECTION, SOLUTION INTRAVENOUS; SUBCUTANEOUS at 12:16

## 2017-05-07 RX ADMIN — Medication 10 ML: at 08:09

## 2017-05-07 RX ADMIN — FERROUS SULFATE TAB 325 MG (65 MG ELEMENTAL FE) 325 MG: 325 (65 FE) TAB at 08:14

## 2017-05-07 RX ADMIN — CYANOCOBALAMIN TAB 1000 MCG 1000 MCG: 1000 TAB at 08:14

## 2017-05-07 RX ADMIN — INSULIN LISPRO 6 UNITS: 100 INJECTION, SOLUTION INTRAVENOUS; SUBCUTANEOUS at 16:13

## 2017-05-07 RX ADMIN — SIMVASTATIN 10 MG: 10 TABLET, FILM COATED ORAL at 23:26

## 2017-05-07 RX ADMIN — METHIMAZOLE 5 MG: 10 TABLET ORAL at 08:12

## 2017-05-07 RX ADMIN — DOCUSATE SODIUM 100 MG: 100 CAPSULE, LIQUID FILLED ORAL at 08:14

## 2017-05-07 RX ADMIN — HEPARIN SODIUM 5000 UNITS: 5000 INJECTION, SOLUTION INTRAVENOUS; SUBCUTANEOUS at 20:55

## 2017-05-07 RX ADMIN — ACETAMINOPHEN 650 MG: 325 TABLET ORAL at 08:14

## 2017-05-07 RX ADMIN — CLOPIDOGREL BISULFATE 75 MG: 75 TABLET ORAL at 08:12

## 2017-05-07 RX ADMIN — DOCUSATE SODIUM 100 MG: 100 CAPSULE, LIQUID FILLED ORAL at 17:06

## 2017-05-07 RX ADMIN — HEPARIN SODIUM 5000 UNITS: 5000 INJECTION, SOLUTION INTRAVENOUS; SUBCUTANEOUS at 04:07

## 2017-05-07 RX ADMIN — ACETAMINOPHEN 650 MG: 325 TABLET ORAL at 17:07

## 2017-05-07 RX ADMIN — AMLODIPINE BESYLATE 5 MG: 5 TABLET ORAL at 08:14

## 2017-05-07 RX ADMIN — Medication 10 ML: at 23:26

## 2017-05-07 RX ADMIN — ASPIRIN 81 MG CHEWABLE TABLET 81 MG: 81 TABLET CHEWABLE at 08:14

## 2017-05-07 RX ADMIN — Medication 10 ML: at 16:10

## 2017-05-07 RX ADMIN — SODIUM CHLORIDE 50 ML/HR: 900 INJECTION, SOLUTION INTRAVENOUS at 20:53

## 2017-05-07 RX ADMIN — INSULIN LISPRO 6 UNITS: 100 INJECTION, SOLUTION INTRAVENOUS; SUBCUTANEOUS at 12:14

## 2017-05-07 RX ADMIN — POLYETHYLENE GLYCOL 3350 17 G: 17 POWDER, FOR SOLUTION ORAL at 09:00

## 2017-05-07 RX ADMIN — SODIUM CHLORIDE 50 ML/HR: 900 INJECTION, SOLUTION INTRAVENOUS at 02:06

## 2017-05-07 NOTE — ROUTINE PROCESS
Received report from [facility]. Pt AAOx3, NAD, breathing non labored, on O2 NC at 2L, sitting up in the recliner on lock position. Call bell within reach. Bedside and Verbal shift change report given to [facility] (oncoming nurse) by me (offgoing nurse).  Report included the following information SBAR, Kardex, Intake/Output, MAR, Recent Results and Cardiac Rhythm SA.

## 2017-05-07 NOTE — PROGRESS NOTES
[facility]  Progress Note    Patient: Bola Delarosa Age: 80 y.o. : 1926 MR#: [id number] SSN: [id number]  Date/Time: 2017 10:55 AM    Subjective:     No F/C, N/V, CP, SOB. Discussed plans for xfer tomorrow. She had raised the topic of hospice, and we discussed what hospice entails, as well as its relation to DNR status. She reiterates DNR status. She says that she'd like to try to pursue therapy efforts to get her strength back, but that she would like to maximize her quality of life. For her, this means trying to get back to at least being able to move around with assistance and a walker. Assessment/Plan:   1. Acute inf STEMI - declines intervention. Med tx. Echo with EF 55-60%, no obvious wall motion abnml. ASA, Plavix, statin. No BBlocker given significant bradycardia earlier with hx intermittent jxnl bradycardia. Off heparin gtt, off NTG gtt as of this AM. Pt wishes for conservative mgmt. No new issues. No CP c/o.  2. Constipation - as above. Bowel regimen. 3. HTN - BPs wnl.   4. DM - by hx. Holding Amaryl. Check HgbA1c. SSI. 5. Hyperlipidemia - statin. 6. COPD - no acute. 7. BURT, dehydration - prerenal. Resolved with fluids. 8. Hx thyrotoxicosis with goiter - methimazole. TSH noted. 9. Hx chronic anemia due iron deficiency - baseline Hgb 10-12. Slight drop from admission. Off heparin gtt. Is Fe-def, supplementing. Hemodynamically stable. H/H stable. 10. Pt is underweight - supplement. Nutrition consult. 11. Chart reviewed. DNR. 12. Aim for xfer tomorrow.      Additional Notes:      Case discussed with:  [x]Patient  []Family  [x]Nursing  []Case Management  DVT Prophylaxis:  []Lovenox  [x]Hep SQ  []SCDs  []Coumadin   []On Heparin gtt    Objective:   VS:   Visit Vitals    /73    Pulse 68    Temp 98 °F (36.7 °C)    Resp 18    Ht 5' 2\" (1.575 m)    Wt 46 kg (101 lb 6.4 oz)    SpO2 99%    BMI 18.55 kg/m2      Tmax/24hrs: Temp (24hrs), Av.7 °F (36.5 °C), Min:97.3 °F (36.3 °C), Max:98 °F (36.7 °C)      Intake/Output Summary (Last 24 hours) at 17 1135  Last data filed at 17 0639   Gross per 24 hour   Intake              815 ml   Output             1000 ml   Net             -185 ml       General:  Awake, alert, NAD. Cardiovascular:  RRR. Pulmonary:  CTA B.  GI:  Soft, NT/ND, NABS. Extremities:  No CT or edema.   Additional:      Labs:    Recent Results (from the past 24 hour(s))   GLUCOSE, POC    Collection Time: 17 11:59 AM   Result Value Ref Range    Glucose (POC) 285 (H) 70 - 110 mg/dL   GLUCOSE, POC    Collection Time: 17  3:23 PM   Result Value Ref Range    Glucose (POC) 169 (H) 70 - 110 mg/dL   GLUCOSE, POC    Collection Time: 17  6:32 PM   Result Value Ref Range    Glucose (POC) 205 (H) 70 - 110 mg/dL   GLUCOSE, POC    Collection Time: 17  9:06 PM   Result Value Ref Range    Glucose (POC) 145 (H) 70 - 110 mg/dL   GLUCOSE, POC    Collection Time: 17  8:05 AM   Result Value Ref Range    Glucose (POC) 119 (H) 70 - 110 mg/dL       Signed By: Carlene Luevano MD     May 7, 2017 10:55 AM

## 2017-05-07 NOTE — ROUTINE PROCESS
Bedside and Verbal shift change report given to Bard Josh RN  (oncoming nurse) by Casandra Mancia RN  (offgoing nurse). Report included the following information SBAR, Kardex, ED Summary, Intake/Output, MAR and Recent Results.

## 2017-05-08 VITALS
RESPIRATION RATE: 16 BRPM | WEIGHT: 101.2 LBS | BODY MASS INDEX: 18.62 KG/M2 | TEMPERATURE: 97.5 F | HEART RATE: 69 BPM | SYSTOLIC BLOOD PRESSURE: 135 MMHG | OXYGEN SATURATION: 100 % | HEIGHT: 62 IN | DIASTOLIC BLOOD PRESSURE: 72 MMHG

## 2017-05-08 LAB
ANION GAP BLD CALC-SCNC: 7 MMOL/L (ref 3–18)
BASOPHILS # BLD AUTO: 0 K/UL (ref 0–0.1)
BASOPHILS # BLD: 0 % (ref 0–2)
BUN SERPL-MCNC: 21 MG/DL (ref 7–18)
BUN/CREAT SERPL: 28 (ref 12–20)
CALCIUM SERPL-MCNC: 8.2 MG/DL (ref 8.5–10.1)
CHLORIDE SERPL-SCNC: 109 MMOL/L (ref 100–108)
CO2 SERPL-SCNC: 25 MMOL/L (ref 21–32)
CREAT SERPL-MCNC: 0.74 MG/DL (ref 0.6–1.3)
DIFFERENTIAL METHOD BLD: ABNORMAL
EOSINOPHIL # BLD: 0.1 K/UL (ref 0–0.4)
EOSINOPHIL NFR BLD: 2 % (ref 0–5)
ERYTHROCYTE [DISTWIDTH] IN BLOOD BY AUTOMATED COUNT: 13.6 % (ref 11.6–14.5)
GLUCOSE BLD STRIP.AUTO-MCNC: 133 MG/DL (ref 70–110)
GLUCOSE BLD STRIP.AUTO-MCNC: 290 MG/DL (ref 70–110)
GLUCOSE SERPL-MCNC: 118 MG/DL (ref 74–99)
HCT VFR BLD AUTO: 27.9 % (ref 35–45)
HGB BLD-MCNC: 9.5 G/DL (ref 12–16)
LYMPHOCYTES # BLD AUTO: 15 % (ref 21–52)
LYMPHOCYTES # BLD: 0.9 K/UL (ref 0.9–3.6)
MCH RBC QN AUTO: 31.7 PG (ref 24–34)
MCHC RBC AUTO-ENTMCNC: 34.1 G/DL (ref 31–37)
MCV RBC AUTO: 93 FL (ref 74–97)
MONOCYTES # BLD: 0.9 K/UL (ref 0.05–1.2)
MONOCYTES NFR BLD AUTO: 15 % (ref 3–10)
NEUTS SEG # BLD: 4 K/UL (ref 1.8–8)
NEUTS SEG NFR BLD AUTO: 68 % (ref 40–73)
PLATELET # BLD AUTO: 220 K/UL (ref 135–420)
PMV BLD AUTO: 10.2 FL (ref 9.2–11.8)
POTASSIUM SERPL-SCNC: 4 MMOL/L (ref 3.5–5.5)
RBC # BLD AUTO: 3 M/UL (ref 4.2–5.3)
SODIUM SERPL-SCNC: 141 MMOL/L (ref 136–145)
WBC # BLD AUTO: 5.9 K/UL (ref 4.6–13.2)

## 2017-05-08 PROCEDURE — 77010033678 HC OXYGEN DAILY

## 2017-05-08 PROCEDURE — 82962 GLUCOSE BLOOD TEST: CPT

## 2017-05-08 PROCEDURE — 74011250637 HC RX REV CODE- 250/637: Performed by: PHYSICIAN ASSISTANT

## 2017-05-08 PROCEDURE — 74011250637 HC RX REV CODE- 250/637: Performed by: INTERNAL MEDICINE

## 2017-05-08 PROCEDURE — 74011250637 HC RX REV CODE- 250/637: Performed by: FAMILY MEDICINE

## 2017-05-08 PROCEDURE — 97530 THERAPEUTIC ACTIVITIES: CPT

## 2017-05-08 PROCEDURE — 85025 COMPLETE CBC W/AUTO DIFF WBC: CPT | Performed by: FAMILY MEDICINE

## 2017-05-08 PROCEDURE — 80048 BASIC METABOLIC PNL TOTAL CA: CPT | Performed by: FAMILY MEDICINE

## 2017-05-08 PROCEDURE — 36415 COLL VENOUS BLD VENIPUNCTURE: CPT | Performed by: FAMILY MEDICINE

## 2017-05-08 PROCEDURE — 74011250636 HC RX REV CODE- 250/636: Performed by: FAMILY MEDICINE

## 2017-05-08 PROCEDURE — 97116 GAIT TRAINING THERAPY: CPT

## 2017-05-08 PROCEDURE — 74011636637 HC RX REV CODE- 636/637: Performed by: FAMILY MEDICINE

## 2017-05-08 PROCEDURE — 97535 SELF CARE MNGMENT TRAINING: CPT

## 2017-05-08 PROCEDURE — 74011250637 HC RX REV CODE- 250/637: Performed by: NURSE PRACTITIONER

## 2017-05-08 RX ORDER — AMLODIPINE BESYLATE 5 MG/1
5 TABLET ORAL DAILY
Qty: 30 TAB | Refills: 0 | Status: SHIPPED | OUTPATIENT
Start: 2017-05-08 | End: 2018-06-19 | Stop reason: SDUPTHER

## 2017-05-08 RX ORDER — INSULIN LISPRO 100 [IU]/ML
INJECTION, SOLUTION INTRAVENOUS; SUBCUTANEOUS
Qty: 1 VIAL | Refills: 0 | Status: SHIPPED | OUTPATIENT
Start: 2017-05-08 | End: 2018-02-22 | Stop reason: CLARIF

## 2017-05-08 RX ORDER — DEXTROMETHORPHAN POLISTIREX 30 MG/5 ML
1 SUSPENSION, EXTENDED RELEASE 12 HR ORAL AS NEEDED
Qty: 10 BOTTLE | Refills: 0 | Status: SHIPPED | OUTPATIENT
Start: 2017-05-08 | End: 2017-05-11

## 2017-05-08 RX ORDER — GUAIFENESIN 100 MG/5ML
81 LIQUID (ML) ORAL DAILY
Qty: 30 TAB | Refills: 1 | Status: SHIPPED | OUTPATIENT
Start: 2017-05-08 | End: 2018-02-22 | Stop reason: CLARIF

## 2017-05-08 RX ORDER — CLOPIDOGREL BISULFATE 75 MG/1
75 TABLET ORAL DAILY
Qty: 30 TAB | Refills: 1 | Status: SHIPPED | OUTPATIENT
Start: 2017-05-08 | End: 2018-02-22

## 2017-05-08 RX ADMIN — CYANOCOBALAMIN TAB 1000 MCG 1000 MCG: 1000 TAB at 09:03

## 2017-05-08 RX ADMIN — ACETAMINOPHEN 650 MG: 325 TABLET ORAL at 09:03

## 2017-05-08 RX ADMIN — METHIMAZOLE 5 MG: 10 TABLET ORAL at 09:03

## 2017-05-08 RX ADMIN — DOCUSATE SODIUM 100 MG: 100 CAPSULE, LIQUID FILLED ORAL at 09:02

## 2017-05-08 RX ADMIN — HEPARIN SODIUM 5000 UNITS: 5000 INJECTION, SOLUTION INTRAVENOUS; SUBCUTANEOUS at 04:02

## 2017-05-08 RX ADMIN — POLYETHYLENE GLYCOL 3350 17 G: 17 POWDER, FOR SOLUTION ORAL at 11:57

## 2017-05-08 RX ADMIN — Medication 5 ML: at 06:00

## 2017-05-08 RX ADMIN — INSULIN LISPRO 6 UNITS: 100 INJECTION, SOLUTION INTRAVENOUS; SUBCUTANEOUS at 11:56

## 2017-05-08 RX ADMIN — ASPIRIN 81 MG CHEWABLE TABLET 81 MG: 81 TABLET CHEWABLE at 09:03

## 2017-05-08 RX ADMIN — CLOPIDOGREL BISULFATE 75 MG: 75 TABLET ORAL at 09:03

## 2017-05-08 RX ADMIN — AMLODIPINE BESYLATE 5 MG: 5 TABLET ORAL at 09:03

## 2017-05-08 RX ADMIN — FERROUS SULFATE TAB 325 MG (65 MG ELEMENTAL FE) 325 MG: 325 (65 FE) TAB at 09:02

## 2017-05-08 NOTE — PROGRESS NOTES
Problem: Mobility Impaired (Adult and Pediatric)  Goal: *Acute Goals and Plan of Care (Insert Text)  Physical Therapy Goals  Initiated 5/5/2017 and to be accomplished within 7 day(s): 5/12/2017  1. Patient will move from supine to sit and sit to supine in bed with modified independence. 2. Patient will transfer from bed to chair and chair to bed with modified independence using the least restrictive device. 3. Patient will perform sit to stand with modified independence. 4. Patient will ambulate with w/c with minimal assistance/contact guard assist for 50 feet using bilat UE and bilat LE for propulsion. PHYSICAL THERAPY TREATMENT     Patient: Ganesh Barraza (1 y.o. female)  Date: 5/8/2017  Diagnosis: STEMI (ST elevation myocardial infarction) ([facility] [address]  STEMI (ST elevation myocardial infarction) ([facility] <principal problem not specified>       Precautions: Fall  Chart, physical therapy assessment, plan of care and goals were reviewed. ASSESSMENT:  Pt presents today alert and agreeable to therapy and transferred from sit to stand and used RW to amb 110ft with CG in hallway. Pt then amb to bathroom where she transferred on/off commode. Pt instructed on deep breathing during BM and required consistent cueing and variety of methods before pt demonstrated understanding. Pt then stood to wash hands at sink and experienced LOB required CG/Ammon from PT; pt reports dizziness and transferred back into locked recliner. Sx abated at this point and pt was left resting in chair with proper handoff to NICOLE for continued OT session. Pt currently improving ambulation distance and demonstrates decreased balance and endurance; will continue to benefit form PT.     Progression toward goals:  [X]      Improving appropriately and progressing toward goals  [ ]      Improving slowly and progressing toward goals  [ ]      Not making progress toward goals and plan of care will be adjusted       PLAN:  Patient continues to benefit from skilled intervention to address the above impairments. Continue treatment per established plan of care. Discharge Recommendations:  Sebastien Vazquezuel  Further Equipment Recommendations for Discharge:  N/A       G-CODES:      Mobility  Current  CJ= 20-39%   Goal  CI= 1-19%. The severity rating is based on the Level of Assistance required for Functional Mobility and ADLs. SUBJECTIVE:   Patient stated I want to keep doing my best.      OBJECTIVE DATA SUMMARY:   Critical Behavior:  Neurologic State: Alert  Orientation Level: Oriented X4  Cognition: Follows commands  Safety/Judgement: Awareness of environment, Fall prevention  Functional Mobility Training:  Bed Mobility:   Scooting: Contact guard assistance   Transfers:  Sit to Stand: Minimum assistance  Stand to Sit: Contact guard assistance      Balance:  Sitting: Intact  Standing: Impaired; With support  Standing - Static: Fair  Standing - Dynamic : Fair  Ambulation/Gait Training:  Distance (ft): 110 Feet (ft)  Assistive Device: Walker, rolling  Ambulation - Level of Assistance: Contact guard assistance    Gait Abnormalities: Decreased step clearance; Path deviations;Trunk sway increased   Base of Support: Narrowed   Speed/[place]: Slow  Step Length: Left shortened;Right shortened   Interventions: Visual/Demos     Pain:  Pt reports 0/10 pain or discomfort prior to treatment. Pt reports 0/10 pain or discomfort post treatment. Activity Tolerance:   Pt tolerated activity well and was pleased with progress this session. Pt c/o minimal dizziness at conclusion of session that abated with sitting in locked recliner. Please refer to the flowsheet for vital signs taken during this treatment.   After treatment:   [X] Patient left in no apparent distress sitting up in chair  [ ] Patient left in no apparent distress in bed  [X] Call bell left within reach  [ ] Nursing notified  [ ] Caregiver present  [ ] Bed alarm activated      [address] Lea Garcia, PT   Time Calculation: 23 mins

## 2017-05-08 NOTE — PROGRESS NOTES
Problem: Self Care Deficits Care Plan (Adult)  Goal: *Acute Goals and Plan of Care (Insert Text)  Occupational Therapy Goals  Initiated 5/6/2017 within 7 day(s). 1. Patient will perform lower body dressing with supervision/set-up. 2. Patient will perform functional task in standing for 5 minutes with supervision for balance. 3. Patient will perform toilet transfers with supervision/set-up. 4. Patient will perform all aspects of toileting with supervision/set-up. 5. Patient will participate in upper extremity therapeutic exercise/activities with supervision/set-up for 8 minutes to increase strength/endurance for ADLs. Outcome: Progressing Towards Goal  OCCUPATIONAL THERAPY TREATMENT     Patient: Daquan Zacarias (74 y.o. female)  Date: 5/8/2017  Diagnosis: STEMI (ST elevation myocardial infarction) ([facility] [address]  STEMI (ST elevation myocardial infarction) ([facility] <principal problem not specified>       Precautions: Fall  Chart, occupational therapy assessment, plan of care, and goals were reviewed. ASSESSMENT: Pt progressing with increasing activity tolerance. Pt motivated for participation in ADL tasks and transfers even after just finishing walking with PT. Provided Pt education on benefits on energy conservation techniques during ADL tasks to inhibit fatigue. Pt demonstrated understanding through return demonstration. Following ambulation to<>from BR and toileting tasks, Pt SpO2 = 99%. Please see below for levels of assistance/independence. Progression toward goals:  [X]          Improving appropriately and progressing toward goals  [ ]          Improving slowly and progressing toward goals  [ ]          Not making progress toward goals and plan of care will be adjusted       PLAN:  Patient continues to benefit from skilled intervention to address the above impairments. Continue treatment per established plan of care.   Discharge Recommendations:  Sebastien Fontaine  Further Equipment Recommendations for Discharge:         SUBJECTIVE:   Patient stated *I don't like sitting around all the time. I like to get up and move around.       G-CODES:      Self Care  Current  CI= 1-19%. The severity rating is based on the Level of Assistance required for Functional Mobility and ADLs. OBJECTIVE DATA SUMMARY:   Cognitive/Behavioral Status:  Neurologic State: Alert  Orientation Level: Oriented X4  Cognition: Follows commands, Appropriate for age attention/concentration  Safety/Judgement: Awareness of environment, Fall prevention  Functional Mobility and Transfers for ADLs:              Bed Mobility:  Scooting: Contact guard assistance              Transfers:  Sit to Stand: Minimum assistance                 Toilet Transfer : Contact guard assistance              Balance:  Sitting: Intact  Standing: Impaired; With support  Standing - Static: Fair  Standing - Dynamic : Fair  ADL Intervention:  Grooming  Washing Face: Supervision/set-up  Washing Hands: Stand-by assistance  Upper Body Dressing Assistance  Front Opened Shirt: Independent (sweater)  Lower Body Dressing Assistance  Socks: Supervision/set-up; Independent  Slip on Shoes with Back: Independent  Leg Crossed Method Used: Yes  Position Performed: Seated in chair  Toileting  Bladder Hygiene: Contact guard assistance  Pain:  Pt reports 0/10 pain or discomfort prior to treatment. Pt reports 0/10 pain or discomfort post treatment. Activity Tolerance:    Fair  Please refer to the flowsheet for vital signs taken during this treatment.   After treatment:   [X]  Patient left in no apparent distress sitting up in chair  [ ]  Patient left in no apparent distress in bed  [X]  Call bell left within reach  [ ]  Nursing notified  [ ]  Caregiver present  [ ]  Bed alarm activated     COREY Dillard  Time Calculation: 27 mins

## 2017-05-08 NOTE — DISCHARGE SUMMARY
[facility]  TRANSFER SUMMARY    Name:  Jay Vaca  MR#:  [id number]  :  1926  Account #:  [de-identified]  Date of Adm:  2017  Date of Transfer:  2017      TRANSFER DIAGNOSES  1. Acute inferior ST elevation myocardial infarction. 2. Constipation. 3. Hypertension. 4. Diabetes mellitus. 5. Hyperlipidemia. 6. Chronic obstructive pulmonary disease. 7. Acute kidney injury and dehydration. 8. History of thyroid toxicosis with goiter. 9. History of chronic iron-deficiency anemia. 10. Underweight. SERVICE: The patient was admitted to the hospitalist service. CONSULTS: Cardiology was consulted. Dr. Tiffany Lind was the attending. Palliative Care was consulted as well. HISTORY OF PRESENT ILLNESS: Please refer to admission H and  P.    Briefly, the patient is a 80-year-old female with a past history significant  for the above, who came to the emergency department from the  nursing facility which chest pain, left side. No palpitations, dizziness or  diaphoresis. She was a poor historian. EKG showed STEMI. A  STEMI code was called. Treatment options were discussed with the  patient. The patient did not want to pursue any sort of intervention  including cardiac catheterization. She wanted to maintain medical  treatment if possible. She is on IV nitroglycerin for this reason. She  was chest pain-free when we saw her. Vital signs on presentation were stable and normal with a pulse 58. She appeared to be in no distress, had a regular heart, otherwise. Clear lungs, benign abdomen, no edema. LABORATORY DATA: On admission included a metabolic panel,  normal with a sodium 134, BUN and creatinine of 41/1. 36. CBC on  admission was normal as well. Chest x-ray shows a suggestion of  pulmonary lesions bilaterally. Correlation with outside imaging may be  helpful. If no outside imaging available consider CT.  Specifically, in the  right mid lung field, there was an irregular opacity measuring 4.1 x 2.6  cm. This overlies the right 7th and 8th intercostal space. Overlying the  left lateral tenth rib there is a rounded density measuring 1.5 x 1.4 cm. No effusions identified. EKG as per above. HOSPITAL COURSE BY PROBLEM  1. Acute inferior ST elevation myocardial infarction: Maintained on  medical therapy. Again, the patient did not want to pursue any sort of  intervention. She was maintained on medical therapy. She was kept on  a heparin drip. We maintained her aspirin, Plavix and a statin. No beta  blocker was given because of significant bradycardia and a history of  intermittent junctional bradycardia. She has had no recurrent chest  pain complaints. In discussing this with her in terms of possible plans  of care, she reiterated her DNR status. She would like to pursue efforts  to get her strength back and would like to maximize her quality of life. For her, this means trying to at least being able to ambulate with  assistance and with a walker. She does not want to pursue any sort of  intervention or procedures. 2. Constipation: Chronic, on a bowel regimen. She had a good bowel  movement after a Fleet enema. Continue on an outpatient basis. Continue to monitor. 3. Hypertension: Her pressures have been normal.  4. Diabetes mellitus: Held Amaryl here. This is by history. She can  resume her outpatient medication. While here, her hemoglobin A1c  was measured at 7.4%. No other issues at the current time. 5. Hyperlipidemia: On statin therapy. 6. Chronic obstructive pulmonary disease: By history. No acute issues. 7. Acute kidney injury due to dehydration: She had a peak BUN and  creatinine measured at 40/1. 41. This normalized with hydration with  gentle fluids. BUN and creatinine today measured at 21/0. 74.  8. History of thyroid toxicosis with goiter: She is on methimazole. We  checked a TSH. It is 10.70. Outpatient followup.   9. Chronic iron-deficiency anemia: Baseline hemoglobin 10-12 on  review of the labs. She had a slight drop from admission. Otherwise,  has been stable. Hemoglobin and hematocrit today measured at  9.5/27.9. 10. The patient was noted be underweight. She received nutritional  supplementation, which can be continued. She has been receiving  Glucerna shakes t.i.d., which I will go ahead and continue. 11. Initially, in discussion earlier in her hospital stay, the patient elected  to go and proceed with hospice, however, she rescinded this. She  would like to try therapy to try to get her strength back and be able to  ambulate and resume her outpatient quality of life. She does, however,  reiterate DNR status. The risks associated with not pursuing further  workup or intervention for #1 were discussed at length with the patient  by Cardiology and myself. The patient declines any further workup. 12. Review of the lab work shows that she had a troponin I initially  of 0.08. This peaked to 83.7 on 05/03/2017. Last measured on 05/04  at 29.4. On examination today, vital signs are stable and normal. The patient  denies any fevers or chills, nausea, vomiting, chest pain, shortness of  breath, palpitations, or edema. She would like to be transferred back to  her facility. She has a regular heart, clear lungs, benign abdomen. No calf  tenderness or edema. LABORATORY DATA: today have been reviewed and include a CBC  with an hemoglobin and hematocrit of 0.1/48.9, metabolic panel within  normal limits. DISPOSITION: The patient will be discharged back to skilled nursing  facility. Finally, she had an echocardiogram while here showing EF of 55% to  60% with no obvious wall motion abnormality identified in the views  obtained. MEDICATIONS ON DISCHARGE: NEW MEDICINES AS FOLLOWS  1. Aspirin 81 mg p.o. daily. 2. Plavix 75 mg p.o. daily. 3. Fleet enema as needed p.r. for constipation. OTHERWISE, RESUME THE FOLLOWING  1. Norvasc 5 mg p.o. daily.   2. Humalog per sliding scale before meals and at bedtime. 3. Tylenol 650 mg as needed every 4 hours. 4. Fosamax 70 mg every week. 5. Feosol 325 mg p.o. every a.m.. 6. Fish oil cap 1000 mg p.o. b.i.d.  7. Amaryl 2 mg p.o. b.i.d.  8. Lantus 5 units subcu every day. 9. Mevacor 20 mg p.o. every p.m. 10. Melatonin 3 mg p.o. at bedtime p.r.n. sleep. 11. Tapazole 5 mg p.o. daily. 12. MiraLax 17 g p.o. daily. 13. Digoxin XL 10 mg p.o. every p.m.  14. Starlix 60 mg p.o. before meals. 15. Vitamin B12 1000 mcg p.o. daily. 16. Vitamin C 250 mg p.o. daily. DISCONTINUE  1. Fioricet. 2. Toprol-XL. FOLLOWUP: With her PCP, Dr. Estephanie Momin, in 7-10 days. Total time of discharge greater than 30 minutes. Case was discussed  with .         Rylee Winslow MD    PP / TW  D:  05/08/2017   13:52  T:  05/08/2017   14:29  Job #:  [id number]

## 2017-05-08 NOTE — PROGRESS NOTES
Care Management Interventions  PCP Verified by CM: Yes  Palliative Care Consult (Criteria: CHF and RRAT>21): Yes  Mode of Transport at Discharge: [address]  [address] Time of Discharge: 3015  Transition of Care Consult (CM Consult): Discharge Planning  MyChart Signup: No  Discharge Durable Medical Equipment: No  Health Maintenance Reviewed: Yes  Physical Therapy Consult: Yes  Occupational Therapy Consult: Yes  Speech Therapy Consult: No  Current Support Network: [phone])  Confirm Follow Up Transport: Family  Plan discussed with Pt/Family/Caregiver: Yes  Discharge Location  Discharge Placement: Skilled nursing facility     Pt is being discharged today and will be transferring to [facility] for short term rehab. [facility] is scheduled to  pt at 1730. Pt, pt's nurse, physician and Ayleen Carmichael) has been informed. Admission liaison with [facility] has been mad aware.

## 2017-05-08 NOTE — PROGRESS NOTES
Problem: Self Care Deficits Care Plan (Adult)  Goal: *Acute Goals and Plan of Care (Insert Text)  Occupational Therapy Goals  Initiated 5/6/2017 within 7 day(s). 1. Patient will perform lower body dressing with supervision/set-up. 2. Patient will perform functional task in standing for 5 minutes with supervision for balance. 3. Patient will perform toilet transfers with supervision/set-up. 4. Patient will perform all aspects of toileting with supervision/set-up. 5. Patient will participate in upper extremity therapeutic exercise/activities with supervision/set-up for 8 minutes to increase strength/endurance for ADLs. OCCUPATIONAL THERAPY NOTE     Patient: Isabela Mccollum (89 y.o. female)  Date: 5/8/2017  Diagnosis: STEMI (ST elevation myocardial infarction) ([facility] [address]  [facility] <principal problem not specified>       Precautions: Fall  Chart, occupational therapy assessment, plan of care, and goals were reviewed. Occupational Therapy treatment attempted. Patient is unable to participate due to:  [ ]  Nausea/vomiting  [ ]  Eating  [ ]  Pain  [ ]  Pt lethargic  [ ]  Off Unit  [X] Other:Currently working with pt     Will f/u later as schedule allows. Thank you.      COREY Murillo/DELGADO

## 2017-05-08 NOTE — ROUTINE PROCESS
TRANSFER - OUT REPORT:    Verbal report given to Latonya Alarcon LPN(name) on Bola Delarosa  being transferred to [facility]) for routine progression of care       Report consisted of patients Situation, Background, Assessment and   Recommendations(SBAR). Information from the following report(s) SBAR, Kardex, Intake/Output and MAR was reviewed with the receiving nurse. Lines:   Peripheral IV 05/02/17 Right Antecubital (Active)   Site Assessment Clean, dry, & intact 5/8/2017  9:04 AM   Phlebitis Assessment 0 5/8/2017  9:04 AM   Infiltration Assessment 0 5/8/2017  9:04 AM   Dressing Status Clean, dry, & intact 5/8/2017  9:04 AM   Dressing Type Tape;Transparent 5/8/2017  9:04 AM   Hub Color/Line Status Green; Infusing;Flushed 5/8/2017  9:04 AM       Peripheral IV 05/02/17 Left Wrist (Active)   Site Assessment Clean, dry, & intact 5/8/2017  9:04 AM   Phlebitis Assessment 0 5/8/2017  9:04 AM   Infiltration Assessment 0 5/8/2017  9:04 AM   Dressing Status Clean, dry, & intact 5/8/2017  9:04 AM   Dressing Type Tape;Transparent 5/8/2017  9:04 AM   Hub Color/Line Status Blue;Capped;Flushed 5/8/2017  9:04 AM        Opportunity for questions and clarification was provided.       Patient transported with:   [employer]

## 2017-05-09 ENCOUNTER — PATIENT OUTREACH (OUTPATIENT)
Dept: INTERNAL MEDICINE CLINIC | Age: 82
End: 2017-05-09

## 2017-05-09 NOTE — PROGRESS NOTES
Transitions of Care Coordination    Ysabel Cornejo was admitted to [facility] 5/2-5/8/17 for a STEMI, BURT and transferred to [facility] for rehab. Patient is a resident of [facility] and would like to return. RRAT = 35    Discharge Summary written by Dr. Ramiro Muniz on 8/0/32 is below in italics. TRANSFER DIAGNOSES  1. Acute inferior ST elevation myocardial infarction. 2. Constipation. 3. Hypertension. 4. Diabetes mellitus. 5. Hyperlipidemia. 6. Chronic obstructive pulmonary disease. 7. Acute kidney injury and dehydration. 8. History of thyroid toxicosis with goiter. 9. History of chronic iron-deficiency anemia. 10. Underweight.     SERVICE: The patient was admitted to the hospitalist service.     CONSULTS: Cardiology was consulted. Dr. Anabel Brennan was the attending. Palliative Care was consulted as well.     HISTORY OF PRESENT ILLNESS: Please refer to admission H and  P.     Briefly, the patient is a 80-year-old female with a past history significant  for the above, who came to the emergency department from the  nursing facility which chest pain, left side. No palpitations, dizziness or  diaphoresis. She was a poor historian. EKG showed STEMI. A  STEMI code was called. Treatment options were discussed with the  patient. The patient did not want to pursue any sort of intervention  including cardiac catheterization. She wanted to maintain medical  treatment if possible. She is on IV nitroglycerin for this reason. She  was chest pain-free when we saw her.     Vital signs on presentation were stable and normal with a pulse 58. She appeared to be in no distress, had a regular heart, otherwise. Clear lungs, benign abdomen, no edema.     LABORATORY DATA: On admission included a metabolic panel,  normal with a sodium 134, BUN and creatinine of 41/1. 36. CBC on  admission was normal as well. Chest x-ray shows a suggestion of  pulmonary lesions bilaterally.  Correlation with outside imaging may be  helpful. If no outside imaging available consider CT. Specifically, in the  right mid lung field, there was an irregular opacity measuring 4.1 x 2.6  cm. This overlies the right 7th and 8th intercostal space. Overlying the  left lateral tenth rib there is a rounded density measuring 1.5 x 1.4 cm. No effusions identified. EKG as per above.  [address]  1. Acute inferior ST elevation myocardial infarction: Maintained on  medical therapy. Again, the patient did not want to pursue any sort of  intervention. She was maintained on medical therapy. She was kept on  a heparin drip. We maintained her aspirin, Plavix and a statin. No beta  blocker was given because of significant bradycardia and a history of  intermittent junctional bradycardia. She has had no recurrent chest  pain complaints. In discussing this with her in terms of possible plans  of care, she reiterated her DNR status. She would like to pursue efforts  to get her strength back and would like to maximize her quality of life. For her, this means trying to at least being able to ambulate with  assistance and with a walker. She does not want to pursue any sort of  intervention or procedures. 2. Constipation: Chronic, on a bowel regimen. She had a good bowel  movement after a Fleet enema. Continue on an outpatient basis. Continue to monitor. 3. Hypertension: Her pressures have been normal.  4. Diabetes mellitus: Held Amaryl here. This is by history. She can  resume her outpatient medication. While here, her hemoglobin A1c  was measured at 7.4%. No other issues at the current time. 5. Hyperlipidemia: On statin therapy. 6. Chronic obstructive pulmonary disease: By history. No acute issues. 7. Acute kidney injury due to dehydration: She had a peak BUN and  creatinine measured at 40/1. 41. This normalized with hydration with  gentle fluids. BUN and creatinine today measured at 21/0. 74.  8. History of thyroid toxicosis with goiter: She is on methimazole. We  checked a TSH. It is 10.70. Outpatient followup. 9. Chronic iron-deficiency anemia: Baseline hemoglobin 10-12 on  review of the labs. She had a slight drop from admission. Otherwise,  has been stable. Hemoglobin and hematocrit today measured at  9.5/27.9. 10. The patient was noted be underweight. She received nutritional  supplementation, which can be continued. She has been receiving  Glucerna shakes t.i.d., which I will go ahead and continue. 11. Initially, in discussion earlier in her hospital stay, the patient elected  to go and proceed with hospice, however, she rescinded this. She  would like to try therapy to try to get her strength back and be able to  ambulate and resume her outpatient quality of life. She does, however,  reiterate DNR status. The risks associated with not pursuing further  workup or intervention for #1 were discussed at length with the patient  by Cardiology and myself. The patient declines any further workup. 12. Review of the lab work shows that she had a troponin I initially  of 0.08. This peaked to 83.7 on 05/03/2017. Last measured on 05/04  at 29. 4.     On examination today, vital signs are stable and normal. The patient  denies any fevers or chills, nausea, vomiting, chest pain, shortness of  breath, palpitations, or edema. She would like to be transferred back to  her facility.     She has a regular heart, clear lungs, benign abdomen. No calf  tenderness or edema.     LABORATORY DATA: today have been reviewed and include a CBC  with an hemoglobin and hematocrit of 2.7/49.0, metabolic panel within  normal limits.     DISPOSITION: The patient will be discharged back to skilled nursing  facility.     Finally, she had an echocardiogram while here showing EF of 55% to  60% with no obvious wall motion abnormality identified in the views  obtained.     MEDICATIONS ON DISCHARGE: NEW MEDICINES AS FOLLOWS  1. Aspirin 81 mg p.o. daily. 2. Plavix 75 mg p.o. daily.  3. Fleet enema as needed p.r. for constipation.     OTHERWISE, RESUME THE FOLLOWING  1. Norvasc 5 mg p.o. daily. 2. Humalog per sliding scale before meals and at bedtime. 3. Tylenol 650 mg as needed every 4 hours. 4. Fosamax 70 mg every week. 5. Feosol 325 mg p.o. every a.m.. 6. Fish oil cap 1000 mg p.o. b.i.d.  7. Amaryl 2 mg p.o. b.i.d.  8. Lantus 5 units subcu every day. 9. Mevacor 20 mg p.o. every p.m. 10. Melatonin 3 mg p.o. at bedtime p.r.n. sleep. 11. Tapazole 5 mg p.o. daily. 12. MiraLax 17 g p.o. daily. 13. Digoxin XL 10 mg p.o. every p.m.  14. Starlix 60 mg p.o. before meals. 15. Vitamin B12 1000 mcg p.o. daily. 16. Vitamin C 250 mg p.o. daily.     DISCONTINUE  1. Fioricet. 2. Toprol-XL.    FOLLOWUP: With her PCP, Dr. Niya Gambino, in 7-10 days. Call to [facility]. Reached nurse, Ho Soto, and identified self/role. Per nurse no problems or concerns noted at this time. Medications reconciled.   Will follow.

## 2017-05-11 ENCOUNTER — PATIENT OUTREACH (OUTPATIENT)
Dept: INTERNAL MEDICINE CLINIC | Age: 82
End: 2017-05-11

## 2017-05-11 NOTE — PROGRESS NOTES
Community Care Team Documentation for Patient in [facility]     Patient discharged from [facility] 5/2/2017 - 5/8/2017 to [facility], on 5/8/2017. Hospital Discharge diagnosis:  STEMI. SNF Attending Provider:      Anticipated discharge date from SNF:  6/6/2017    Dispo: return to [address] [place] [address]  PCP : Braxton Cespedes MD    Nurse Navigator: Mirtha Arambula RN    Health maintenance:     HTN, DMII, COPD     walking with RW, contact guard.  DDNR    High Risk            27       Total Score        3 Relationship with PCP    2 Patient Living Status    4 More than 1 Admission in calendar year    18 Charlson Comorbidity Score        Criteria that do not apply:    Patient Length of Stay > 5    Patient Insurance is Medicare, Medicaid or Self Pay

## 2017-05-12 NOTE — DISCHARGE SUMMARY
[address]  [id number] [place] SUMMARY    Name:  Elsa Adam  MR#:  [id number]  :  [id number]  Account #:  [de-identified]  Date of Adm:  2017  Date of Discharge:  2017      ADDENDUM    The patient was noted to be underweight. She had mild malnutrition. She was supplemented through the course of her hospital stay with  Glucerna shakes and vitamin supplementation.         El Curry MD PP / [facility]  D:  2017   15:33  T:  2017   19:50  Job #:  [id number]

## 2017-05-18 ENCOUNTER — PATIENT OUTREACH (OUTPATIENT)
Dept: INTERNAL MEDICINE CLINIC | Age: 82
End: 2017-05-18

## 2017-05-19 NOTE — PROGRESS NOTES
Community Care Team Documentation for Patient in [facility]      Patient discharged from [facility] 5/2/2017 - 5/8/2017 to [facility], on 5/8/2017.   [facility] Discharge diagnosis: STEMI.     SNF Attending Provider:      Anticipated discharge date from SNF: 6/6/2017     Dispo: return to [facility]    PCP : Alyssia Torres MD     Nurse Navigator: Luis Ga RN    CCT rounds conducted, updates provided by facility     HTN, DMII, COPD:stable     walking 150 ft with standby assist. Balance testing.  DDNR     High Risk       27   Total Score       3 Relationship with PCP   2 Patient Living Status   4 More than 1 Admission in calendar year   18 Charlson Comorbidity Score       Criteria that do not apply:   Patient Length of Stay > 5   Patient Insurance is Medicare, Medicaid or Self Pay

## 2017-05-25 ENCOUNTER — PATIENT OUTREACH (OUTPATIENT)
Dept: INTERNAL MEDICINE CLINIC | Age: 82
End: 2017-05-25

## 2017-05-25 NOTE — PROGRESS NOTES
Community Care Team Documentation for Patient in [address]  Patient discharged from [facility] 5/2/2017 - 5/8/2017 to [facility], on 5/8/2017.   [facility] Discharge diagnosis: STEMI.      SNF Attending Provider:       Anticipated discharge date from SNF: 6/6/2017      Dispo: return to [place] [address]  PCP : Robert Vaca MD      Nurse Navigator: James Montelongo RN     CCT rounds conducted, updates provided by facility      HTN, DMII, COPD:stable      walking 200ft with standby assist. Progressing with therapy.       High Risk       27   Total Score       3 Relationship with PCP   2 Patient Living Status   4 More than 1 Admission in calendar year   18 Charlson Comorbidity Score       Criteria that do not apply:   Patient Length of Stay > 5   Patient Insurance is Medicare, Medicaid or Self Pay

## 2017-06-01 ENCOUNTER — PATIENT OUTREACH (OUTPATIENT)
Dept: INTERNAL MEDICINE CLINIC | Age: 82
End: 2017-06-01

## 2017-06-01 ENCOUNTER — HOSPITAL ENCOUNTER (OUTPATIENT)
Dept: LAB | Age: 82
Discharge: HOME | End: 2017-06-01

## 2017-06-01 PROCEDURE — 85025 COMPLETE CBC W/AUTO DIFF WBC: CPT | Performed by: INTERNAL MEDICINE

## 2017-06-01 NOTE — PROGRESS NOTES
Community Care Team Documentation for Patient in [address]  Patient discharged from [address] [place] [address] 5/2/2017 - 5/8/2017 to [facility], on 5/8/2017.   [facility] Discharge diagnosis: STEMI.      SNF Attending Provider:       Anticipated discharge date from SNF: 6/6/2017      Dispo: return to [place] [address]  PCP : Jose Raul Malcolm MD      Nurse Navigator: Bertha Nunez RN      CCT rounds conducted, updates provided by facility      HTN, DMII, COPD:stable      walking 250ft with standby assist. Progressing with therapy. Balance issues. Facility recommending neuro consult.  Labs and UA sent.        High Risk       27

## 2017-06-02 ENCOUNTER — HOSPITAL ENCOUNTER (OUTPATIENT)
Dept: LAB | Age: 82
Discharge: HOME | End: 2017-06-02

## 2017-06-02 LAB
ALBUMIN SERPL BCP-MCNC: 3.3 G/DL (ref 3.4–5)
ALBUMIN/GLOB SERPL: 0.9 {RATIO} (ref 0.8–1.7)
ALP SERPL-CCNC: 49 U/L (ref 45–117)
ALT SERPL-CCNC: 23 U/L (ref 13–56)
ANION GAP BLD CALC-SCNC: 6 MMOL/L (ref 3–18)
APPEARANCE UR: ABNORMAL
AST SERPL W P-5'-P-CCNC: 19 U/L (ref 15–37)
BACTERIA URNS QL MICRO: ABNORMAL /HPF
BASOPHILS # BLD AUTO: 0 K/UL (ref 0–0.1)
BASOPHILS # BLD: 0 % (ref 0–2)
BILIRUB SERPL-MCNC: 0.2 MG/DL (ref 0.2–1)
BILIRUB UR QL: NEGATIVE
BUN SERPL-MCNC: 44 MG/DL (ref 7–18)
BUN/CREAT SERPL: 35 (ref 12–20)
CALCIUM SERPL-MCNC: 9.2 MG/DL (ref 8.5–10.1)
CHLORIDE SERPL-SCNC: 103 MMOL/L (ref 100–108)
CO2 SERPL-SCNC: 27 MMOL/L (ref 21–32)
COLOR UR: YELLOW
CREAT SERPL-MCNC: 1.24 MG/DL (ref 0.6–1.3)
DIFFERENTIAL METHOD BLD: ABNORMAL
EOSINOPHIL # BLD: 0.1 K/UL (ref 0–0.4)
EOSINOPHIL NFR BLD: 2 % (ref 0–5)
EPITH CASTS URNS QL MICRO: ABNORMAL /LPF (ref 0–5)
ERYTHROCYTE [DISTWIDTH] IN BLOOD BY AUTOMATED COUNT: 13.3 % (ref 11.6–14.5)
GLOBULIN SER CALC-MCNC: 3.5 G/DL (ref 2–4)
GLUCOSE SERPL-MCNC: 156 MG/DL (ref 74–99)
GLUCOSE UR STRIP.AUTO-MCNC: NEGATIVE MG/DL
HCT VFR BLD AUTO: 32.8 % (ref 35–45)
HGB BLD-MCNC: 11 G/DL (ref 12–16)
HGB UR QL STRIP: ABNORMAL
KETONES UR QL STRIP.AUTO: NEGATIVE MG/DL
LEUKOCYTE ESTERASE UR QL STRIP.AUTO: ABNORMAL
LYMPHOCYTES # BLD AUTO: 20 % (ref 21–52)
LYMPHOCYTES # BLD: 1 K/UL (ref 0.9–3.6)
MCH RBC QN AUTO: 30.6 PG (ref 24–34)
MCHC RBC AUTO-ENTMCNC: 33.5 G/DL (ref 31–37)
MCV RBC AUTO: 91.1 FL (ref 74–97)
MONOCYTES # BLD: 0.8 K/UL (ref 0.05–1.2)
MONOCYTES NFR BLD AUTO: 16 % (ref 3–10)
NEUTS SEG # BLD: 3.2 K/UL (ref 1.8–8)
NEUTS SEG NFR BLD AUTO: 62 % (ref 40–73)
NITRITE UR QL STRIP.AUTO: POSITIVE
PH UR STRIP: 6 [PH] (ref 5–8)
PLATELET # BLD AUTO: 260 K/UL (ref 135–420)
PMV BLD AUTO: 10.6 FL (ref 9.2–11.8)
POTASSIUM SERPL-SCNC: 4.5 MMOL/L (ref 3.5–5.5)
PROT SERPL-MCNC: 6.8 G/DL (ref 6.4–8.2)
PROT UR STRIP-MCNC: NEGATIVE MG/DL
RBC # BLD AUTO: 3.6 M/UL (ref 4.2–5.3)
RBC #/AREA URNS HPF: ABNORMAL /HPF (ref 0–5)
SODIUM SERPL-SCNC: 136 MMOL/L (ref 136–145)
SP GR UR REFRACTOMETRY: 1.01 (ref 1–1.03)
TSH SERPL DL<=0.05 MIU/L-ACNC: 4.76 UIU/ML (ref 0.36–3.74)
UROBILINOGEN UR QL STRIP.AUTO: 0.2 EU/DL (ref 0.2–1)
WBC # BLD AUTO: 5.2 K/UL (ref 4.6–13.2)
WBC URNS QL MICRO: ABNORMAL /HPF (ref 0–4)

## 2017-06-02 PROCEDURE — 81001 URINALYSIS AUTO W/SCOPE: CPT | Performed by: INTERNAL MEDICINE

## 2017-06-02 PROCEDURE — 87186 SC STD MICRODIL/AGAR DIL: CPT | Performed by: INTERNAL MEDICINE

## 2017-06-02 PROCEDURE — 84443 ASSAY THYROID STIM HORMONE: CPT | Performed by: INTERNAL MEDICINE

## 2017-06-02 PROCEDURE — 87077 CULTURE AEROBIC IDENTIFY: CPT | Performed by: INTERNAL MEDICINE

## 2017-06-02 PROCEDURE — 87086 URINE CULTURE/COLONY COUNT: CPT | Performed by: INTERNAL MEDICINE

## 2017-06-02 PROCEDURE — 80053 COMPREHEN METABOLIC PANEL: CPT | Performed by: INTERNAL MEDICINE

## 2017-06-04 LAB
BACTERIA SPEC CULT: ABNORMAL
SERVICE CMNT-IMP: ABNORMAL

## 2017-06-07 ENCOUNTER — HOME VISIT (OUTPATIENT)
Dept: INTERNAL MEDICINE CLINIC | Age: 82
End: 2017-06-07

## 2017-06-07 ENCOUNTER — PATIENT OUTREACH (OUTPATIENT)
Dept: INTERNAL MEDICINE CLINIC | Age: 82
End: 2017-06-07

## 2017-06-07 DIAGNOSIS — Z79.4 CONTROLLED TYPE 2 DIABETES MELLITUS WITHOUT COMPLICATION, WITH LONG-TERM CURRENT USE OF INSULIN (HCC): ICD-10-CM

## 2017-06-07 DIAGNOSIS — E05.00 THYROTOXICOSIS WITH DIFFUSE GOITER AND WITHOUT THYROID STORM: ICD-10-CM

## 2017-06-07 DIAGNOSIS — E78.2 MIXED HYPERLIPIDEMIA: ICD-10-CM

## 2017-06-07 DIAGNOSIS — M54.2 NECK PAIN: ICD-10-CM

## 2017-06-07 DIAGNOSIS — I10 ESSENTIAL HYPERTENSION: ICD-10-CM

## 2017-06-07 DIAGNOSIS — E11.9 CONTROLLED TYPE 2 DIABETES MELLITUS WITHOUT COMPLICATION, WITH LONG-TERM CURRENT USE OF INSULIN (HCC): ICD-10-CM

## 2017-06-07 DIAGNOSIS — I21.11 ST ELEVATION MYOCARDIAL INFARCTION INVOLVING RIGHT CORONARY ARTERY (HCC): Primary | ICD-10-CM

## 2017-06-07 RX ORDER — ACETAMINOPHEN 325 MG/1
650 TABLET ORAL
COMMUNITY
End: 2019-03-23 | Stop reason: ALTCHOICE

## 2017-06-07 RX ORDER — INSULIN ASPART 100 [IU]/ML
INJECTION, SOLUTION INTRAVENOUS; SUBCUTANEOUS
COMMUNITY
End: 2018-08-06

## 2017-06-07 RX ORDER — CETIRIZINE HCL 10 MG
TABLET ORAL DAILY
COMMUNITY
End: 2018-02-22

## 2017-06-07 RX ORDER — CEPHALEXIN 500 MG/1
500 CAPSULE ORAL 2 TIMES DAILY
COMMUNITY
Start: 2017-06-01 | End: 2017-06-15 | Stop reason: ALTCHOICE

## 2017-06-07 NOTE — PROGRESS NOTES
Transitions of Care Coordination    Kimberly Singh was admitted to [facility] 5/2-5/8/17 for a STEMI, BURT and transferred to [facility] for rehab 5/8-6/6/17. Per hospital notes the patient elected to not pursue any cardiac intervention and wishes to be maintained medically. A DDNR is on file. The patient has returned to [facility] where she is a permanent resident. [facility] is ordered for PT and OT. Dr. Sree Buenrostro saw the patient this morning at [facility]. Call to [facility]. Reached nurse, Damian Dos Santos, and identified self/role. Per nurse patient is doing well with no problems identified at this time. Nurse stated patient is glad to be home. Reconciled medications. Per nurse labs are ordered for tomorrow 6/8/17-CBC, BMP, T4, TSH and will be reported to Dr. Sree Buenrostro. Will follow.

## 2017-06-07 NOTE — PROGRESS NOTES
The patient is being evaluated by me today at [facility] for the chief complaint of neck pain and for transitions of care    HPI     The patient was recently hospitalized at [facility] for an acute ST wave elevation inferior wall MI. The patient was discharged on 5/8/17 to [facility] for skilled care and rehab. The patient was discharged to [facility] on 6/6/17. and contacted by Emily Ramirez on 6/7/17 for follow up. An appointment was made for the patient to see me today. The patient denies chest pain or dyspnea. The patient complains of pain and stiffness in her neck. No clear inciting factors. The pain is worse with movement. The patient remains on medications for hypertension, hyperlipidemia and hyperthyroidism. She is doing ok on the medications. The patient remains on Latus and sliding scale insulin for type II diabetes mellitus. The sugars are slightly high but overall they are doing ok. The patient complains of the cost of the medications. Review of Systems   Respiratory: Negative for shortness of breath. Cardiovascular: Negative for chest pain and leg swelling. Musculoskeletal: Positive for neck pain. Allergies   Allergen Reactions    Nitrofurantoin Nausea and Vomiting       Current Outpatient Prescriptions   Medication Sig Dispense Refill    lidocaine (LIDODERM) 5 % by TransDERmal route every twenty-four (24) hours. Apply patch to the affected area for 12 hours a day and remove for 12 hours a day.  acetaminophen (TYLENOL) 325 mg tablet Take 650 mg by mouth every four (4) hours.  naphazoline-pheniramine (VISINE-A) 0.025-0.3 % ophthalmic solution Administer  to both eyes two (2) times daily as needed.  cetirizine (ZYRTEC) 10 mg tablet Take  by mouth daily.  acetaminophen (TYLENOL) 325 mg tablet Take 650 mg by mouth every six (6) hours as needed for Pain.       insulin aspart (NOVOLOG) 100 unit/mL injection by SubCUTAneous route. Indications: sliding scale      SOD PHOS,M-B/NA PHOS,DI-BA (FLEET ENEMA RE) Insert  into rectum.  amLODIPine (NORVASC) 5 mg tablet Take 1 Tab by mouth daily. 30 Tab 0    aspirin 81 mg chewable tablet Take 1 Tab by mouth daily. 30 Tab 1    clopidogrel (PLAVIX) 75 mg tab Take 1 Tab by mouth daily. 30 Tab 1    oxybutynin chloride XL (DITROPAN XL) 10 mg CR tablet TAKE 1 TABLET BY MOUTH AT BEDTIME FOR BLADDER CONTGROL 30 Tab 0    insulin glargine (LANTUS) 100 unit/mL injection 5 Units by SubCUTAneous route daily (with breakfast).  nateglinide (STARLIX) 60 mg tablet Take 60 mg by mouth Before breakfast, lunch, and dinner.  ascorbic acid, vitamin C, (VITAMIN C) 250 mg tablet Take  by mouth.  ferrous sulfate (FEOSOL) 325 mg (65 mg iron) tablet Take  by mouth Daily (before breakfast).  polyethylene glycol (MIRALAX) 17 gram packet Take 17 g by mouth daily.  TRUEPLUS LANCETS 30 gauge misc Use to check blood glucose up to twice daily DX:e11.9 300 Lancet 3    glimepiride (AMARYL) 2 mg tablet 1 tablet at breakfast.  1 tablet at supper 180 Tab 3    alendronate (FOSAMAX) 70 mg tablet TAKE 1 TABLET WEEKLY 12 Tab 3    lovastatin (MEVACOR) 20 mg tablet TAKE 1 TABLET DAILY 90 Tab 3    ASCENSIA CONTOUR strip USE TO TEST BLOOD SUGARS THREE TIMES DAILY (Patient taking differently: USE TO TEST BLOOD SUGARS THREE TIMES DAILY--embrace strips) 100 Strip 11    melatonin 3 mg tablet Take 3 mg by mouth nightly as needed.  cyanocobalamin (VITAMIN B-12) 1,000 mcg tablet Take 1,000 mcg by mouth daily.  omega-3 fatty acids-vitamin e (FISH OIL) 1,000 mg Cap Take 1 Cap by mouth two (2) times a day.  oxyCODONE IR (ROXICODONE) 5 mg immediate release tablet Take 5 mg by mouth every six (6) hours as needed for Pain (Take 1/2 tablet every 6 hours as needed for pain).       insulin lispro (HUMALOG) 100 unit/mL injection For Blood Sugar (mg/dL) of:     Less than 150 =   0 units 150 -199 =   2 units  200 -249 =   4 units  250 -299 =   6 units  300 -349 =   8 units  350 and above = 10 units and Call Physician 1 Vial 0       Past Medical History:   Diagnosis Date    Abnormality of gait     Acute upper respiratory infections of unspecified site     Broken shoulder     right    Chest pain     Chronic airway obstruction, not elsewhere classified     COPD     Diabetes ([facility] [address]     Essential hypertension, benign     Hearing loss     History of tachycardia     possible SVT    Hypercholesterolemia     Hyperlipidemia     Hypertension     Incontinence     Mitral valve prolapse     MVP (mitral valve prolapse)     OAB (overactive bladder)     Osteoarthritis     spine    Osteoporosis     Pure hypercholesterolemia     Senile osteoporosis     Thyroid disease     Thyrotoxicosis     Type II or unspecified type diabetes mellitus without mention of complication, not stated as uncontrolled     Urinary tract infection, site not specified        Past Surgical History:   Procedure Laterality Date    HX KNEE REPLACEMENT  left    HX OPEN REDUCTION INTERNAL FIXATION Left     patellar fracture    HX OTHER SURGICAL Right     shoulder surgery    REMV CATARACT EXTRACAP,INSERT LENS,COMP  [id number]    bilateral       Social History     Social History    Marital status:      Spouse name: N/A    Number of children: N/A    Years of education: N/A     Occupational History    Not on file. Social History Main Topics    Smoking status: Never Smoker    Smokeless tobacco: Never Used    Alcohol use No    Drug use: No    Sexual activity: No     Other Topics Concern    Not on file     Social History Narrative       Patient does have an advanced directive on file    Physical Exam   Neck: Carotid bruit is not present. No thyromegaly present. Cardiovascular: Normal rate and regular rhythm. Exam reveals no gallop. No murmur heard. Pulmonary/Chest: She has no wheezes.  She has no rales.   Abdominal: Soft. She exhibits no distension. There is no tenderness. Musculoskeletal: She exhibits no edema. Lymphadenopathy:     She has no cervical adenopathy.        Hospital Outpatient Visit on 06/02/2017   Component Date Value Ref Range Status    Color 06/02/2017 YELLOW    Final    Appearance 06/02/2017 CLOUDY    Final    Specific gravity 06/02/2017 1.013  1.005 - 1.030   Final    pH (UA) 06/02/2017 6.0  5.0 - 8.0   Final    Protein 06/02/2017 NEGATIVE   NEG mg/dL Final    Glucose 06/02/2017 NEGATIVE   NEG mg/dL Final    Ketone 06/02/2017 NEGATIVE   NEG mg/dL Final    Bilirubin 06/02/2017 NEGATIVE   NEG   Final    Blood 06/02/2017 TRACE* NEG   Final    Urobilinogen 06/02/2017 0.2  0.2 - 1.0 EU/dL Final    Nitrites 06/02/2017 POSITIVE* NEG   Final    Leukocyte Esterase 06/02/2017 LARGE* NEG   Final    Special Requests: 06/02/2017 NO SPECIAL REQUESTS    Final    Culture result: 06/02/2017 *   Final                    Value:>100,000  COLONIES/mL  KLEBSIELLA PNEUMONIAE      WBC 06/02/2017 TOO NUMEROUS TO COUNT  0 - 4 /hpf Final    RBC 06/02/2017 0 to 3  0 - 5 /hpf Final    Epithelial cells 06/02/2017 1+  0 - 5 /lpf Final    Bacteria 06/02/2017 4+* NEG /hpf Final   Hospital Outpatient Visit on 06/01/2017   Component Date Value Ref Range Status    WBC 06/01/2017 5.2  4.6 - 13.2 K/uL Final    RBC 06/01/2017 3.60* 4.20 - 5.30 M/uL Final    HGB 06/01/2017 11.0* 12.0 - 16.0 g/dL Final    Specimen incubated at 37 degrees to correct for possible cold agglutinin    HCT 06/01/2017 32.8* 35.0 - 45.0 % Final    Specimen incubated at 37 degrees to correct for possible cold agglutinin    MCV 06/01/2017 91.1  74.0 - 97.0 FL Final    MCH 06/01/2017 30.6  24.0 - 34.0 PG Final    Specimen incubated at 37 degrees to correct for possible cold agglutinin    MCHC 06/01/2017 33.5  31.0 - 37.0 g/dL Final    Specimen incubated at 37 degrees to correct for possible cold agglutinin    RDW 06/01/2017 13.3 11.6 - 14.5 % Final    PLATELET 81/30/8771 287  135 - 420 K/uL Final    MPV 06/01/2017 10.6  9.2 - 11.8 FL Final    NEUTROPHILS 06/01/2017 62  40 - 73 % Final    LYMPHOCYTES 06/01/2017 20* 21 - 52 % Final    MONOCYTES 06/01/2017 16* 3 - 10 % Final    EOSINOPHILS 06/01/2017 2  0 - 5 % Final    BASOPHILS 06/01/2017 0  0 - 2 % Final    ABS. NEUTROPHILS 06/01/2017 3.2  1.8 - 8.0 K/UL Final    ABS. LYMPHOCYTES 06/01/2017 1.0  0.9 - 3.6 K/UL Final    ABS. MONOCYTES 06/01/2017 0.8  0.05 - 1.2 K/UL Final    ABS. EOSINOPHILS 06/01/2017 0.1  0.0 - 0.4 K/UL Final    ABS. BASOPHILS 06/01/2017 0.0  0.0 - 0.1 K/UL Final    DF 06/01/2017 AUTOMATED    Final   Hospital Outpatient Visit on 06/01/2017   Component Date Value Ref Range Status    Sodium 06/02/2017 136  136 - 145 mmol/L Final    Potassium 06/02/2017 4.5  3.5 - 5.5 mmol/L Final    Chloride 06/02/2017 103  100 - 108 mmol/L Final    CO2 06/02/2017 27  21 - 32 mmol/L Final    Anion gap 06/02/2017 6  3.0 - 18 mmol/L Final    Glucose 06/02/2017 156* 74 - 99 mg/dL Final    BUN 06/02/2017 44* 7.0 - 18 MG/DL Final    Creatinine 06/02/2017 1.24  0.6 - 1.3 MG/DL Final    BUN/Creatinine ratio 06/02/2017 35* 12 - 20   Final    GFR est AA 06/02/2017 49* >60 ml/min/1.73m2 Final    GFR est non-AA 06/02/2017 41* >60 ml/min/1.73m2 Final    Comment: (NOTE)  Estimated GFR is calculated using the Modification of Diet in Renal   Disease (MDRD) Study equation, reported for both  Americans   (GFRAA) and non- Americans (GFRNA), and normalized to 1.73m2   body surface area. The physician must decide which value applies to   the patient. The MDRD study equation should only be used in   individuals age 25 or older. It has not been validated for the   following: pregnant women, patients with serious comorbid conditions,   or on certain medications, or persons with extremes of body size,   muscle mass, or nutritional status.       Calcium 06/02/2017 9.2  8.5 - 10.1 MG/DL Final    Bilirubin, total 06/02/2017 0.2  0.2 - 1.0 MG/DL Final    ALT (SGPT) 06/02/2017 23  13 - 56 U/L Final    AST (SGOT) 06/02/2017 19  15 - 37 U/L Final    Alk. phosphatase 06/02/2017 49  45 - 117 U/L Final    Protein, total 06/02/2017 6.8  6.4 - 8.2 g/dL Final    Albumin 06/02/2017 3.3* 3.4 - 5.0 g/dL Final    Globulin 06/02/2017 3.5  2.0 - 4.0 g/dL Final    A-G Ratio 06/02/2017 0.9  0.8 - 1.7   Final    TSH 06/02/2017 4.76* 0.36 - 3.74 uIU/mL Final   Admission on 05/02/2017, Discharged on 05/08/2017   No results displayed because visit has over 200 results. Hospital Outpatient Visit on 03/22/2017   Component Date Value Ref Range Status    WBC 03/22/2017 5.2  4.6 - 13.2 K/uL Final    RBC 03/22/2017 3.00* 4.20 - 5.30 M/uL Final    HGB 03/22/2017 11.5* 12.0 - 16.0 g/dL Final    HCT 03/22/2017 28.8* 35.0 - 45.0 % Final    MCV 03/22/2017 96.0  74.0 - 97.0 FL Final    MCH 03/22/2017 38.3* 24.0 - 34.0 PG Final    MCHC 03/22/2017 39.9* 31.0 - 37.0 g/dL Final    RDW 03/22/2017 14.2  11.6 - 14.5 % Final    PLATELET 63/45/7734 565  135 - 420 K/uL Final    MPV 03/22/2017 10.7  9.2 - 11.8 FL Final    NEUTROPHILS 03/22/2017 72  40 - 73 % Final    LYMPHOCYTES 03/22/2017 13* 21 - 52 % Final    MONOCYTES 03/22/2017 13* 3 - 10 % Final    EOSINOPHILS 03/22/2017 2  0 - 5 % Final    BASOPHILS 03/22/2017 0  0 - 2 % Final    ABS. NEUTROPHILS 03/22/2017 3.7  1.8 - 8.0 K/UL Final    ABS. LYMPHOCYTES 03/22/2017 0.7* 0.9 - 3.6 K/UL Final    ABS. MONOCYTES 03/22/2017 0.7  0.05 - 1.2 K/UL Final    ABS. EOSINOPHILS 03/22/2017 0.1  0.0 - 0.4 K/UL Final    ABS.  BASOPHILS 03/22/2017 0.0  0.0 - 0.06 K/UL Final    DF 03/22/2017 AUTOMATED    Final    Triiodothyronine (T3), free 03/22/2017 2.6  2.3 - 4.2 PG/ML Final    T4, Free 03/22/2017 0.7  0.7 - 1.5 NG/DL Final    Sodium 03/22/2017 136  136 - 145 mmol/L Final    Potassium 03/22/2017 4.9  3.5 - 5.5 mmol/L Final    Chloride 03/22/2017 100  100 - 108 mmol/L Final    CO2 03/22/2017 29  21 - 32 mmol/L Final    Anion gap 03/22/2017 7  3.0 - 18 mmol/L Final    Glucose 03/22/2017 219* 74 - 99 mg/dL Final    BUN 03/22/2017 38* 7.0 - 18 MG/DL Final    Creatinine 03/22/2017 1.20  0.6 - 1.3 MG/DL Final    BUN/Creatinine ratio 03/22/2017 32* 12 - 20   Final    GFR est AA 03/22/2017 51* >60 ml/min/1.73m2 Final    GFR est non-AA 03/22/2017 42* >60 ml/min/1.73m2 Final    Comment: (NOTE)  Estimated GFR is calculated using the Modification of Diet in Renal   Disease (MDRD) Study equation, reported for both  Americans   (GFRAA) and non- Americans (GFRNA), and normalized to 1.73m2   body surface area. The physician must decide which value applies to   the patient. The MDRD study equation should only be used in   individuals age 25 or older. It has not been validated for the   following: pregnant women, patients with serious comorbid conditions,   or on certain medications, or persons with extremes of body size,   muscle mass, or nutritional status.  Calcium 03/22/2017 9.4  8.5 - 10.1 MG/DL Final    Bilirubin, total 03/22/2017 0.5  0.2 - 1.0 MG/DL Final    ALT (SGPT) 03/22/2017 23  13 - 56 U/L Final    AST (SGOT) 03/22/2017 19  15 - 37 U/L Final    Alk.  phosphatase 03/22/2017 50  45 - 117 U/L Final    Protein, total 03/22/2017 7.3  6.4 - 8.2 g/dL Final    Albumin 03/22/2017 3.5  3.4 - 5.0 g/dL Final    Globulin 03/22/2017 3.8  2.0 - 4.0 g/dL Final    A-G Ratio 03/22/2017 0.9  0.8 - 1.7   Final    TSH 03/22/2017 4.41* 0.36 - 3.74 uIU/mL Final    Hemoglobin A1c 03/22/2017 7.3* 4.2 - 5.6 % Final    Comment: (NOTE)  HbA1C Interpretive Ranges  <5.7              Normal  5.7 - 6.4         Consider Prediabetes  >6.5              Consider Diabetes      Est. average glucose 03/22/2017 163  mg/dL Final    Comment: (NOTE)  The eAG should be interpreted with patient characteristics in mind   since ethnicity, interindividual differences, red cell lifespan,   variation in rates of glycation, etc. may affect the validity of the   calculation. .No results found for any visits on 06/07/17. Assessment / Plan:      ICD-10-CM ICD-9-CM    1. ST elevation myocardial infarction involving right coronary artery (HCC) I21.11 410.31    2. Controlled type 2 diabetes mellitus without complication, with long-term current use of insulin (HCC) E11.9 250.00     Z79.4 V58.67    3. Mixed hyperlipidemia E78.2 272.2    4. 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 Essential hypertension I10 401.9    6. Neck pain M54.2 723.1      Encounter Diagnoses   Name Primary? 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   Melissa Rivera states that she understands the treatment plan and agrees with the treatment plan      LOS:  [id number]

## 2017-06-08 ENCOUNTER — TELEPHONE (OUTPATIENT)
Dept: INTERNAL MEDICINE CLINIC | Age: 82
End: 2017-06-08

## 2017-06-08 NOTE — TELEPHONE ENCOUNTER
[facility] saw Ms Willis Zhangedler for her PT eval today. She will getting PT 3x a week for 1 month.  Orders will be coming via fax for Dr Christine Mercer to sign

## 2017-06-09 ENCOUNTER — TELEPHONE (OUTPATIENT)
Dept: INTERNAL MEDICINE CLINIC | Age: 82
End: 2017-06-09

## 2017-06-09 NOTE — TELEPHONE ENCOUNTER
Ama Revels with [facility] called to let Dr Anyi Beckett know that she will be giving OT. Any questions give her a call at [phone].

## 2017-06-10 ENCOUNTER — APPOINTMENT (OUTPATIENT)
Dept: CT IMAGING | Age: 82
End: 2017-06-10
Attending: EMERGENCY MEDICINE
Payer: MEDICARE

## 2017-06-10 ENCOUNTER — APPOINTMENT (OUTPATIENT)
Dept: GENERAL RADIOLOGY | Age: 82
End: 2017-06-10
Attending: EMERGENCY MEDICINE
Payer: MEDICARE

## 2017-06-10 ENCOUNTER — HOSPITAL ENCOUNTER (EMERGENCY)
Age: 82
Discharge: OTHER INSTITUTION | End: 2017-06-10
Attending: EMERGENCY MEDICINE | Admitting: EMERGENCY MEDICINE
Payer: MEDICARE

## 2017-06-10 VITALS
DIASTOLIC BLOOD PRESSURE: 83 MMHG | BODY MASS INDEX: 18.69 KG/M2 | OXYGEN SATURATION: 98 % | RESPIRATION RATE: 26 BRPM | HEIGHT: 61 IN | WEIGHT: 99 LBS | TEMPERATURE: 97.8 F | HEART RATE: 103 BPM | SYSTOLIC BLOOD PRESSURE: 152 MMHG

## 2017-06-10 DIAGNOSIS — R03.0 ELEVATED BLOOD PRESSURE READING: ICD-10-CM
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
## 2017-06-10 DIAGNOSIS — S22.42XA CLOSED FRACTURE OF MULTIPLE RIBS OF LEFT SIDE, INITIAL ENCOUNTER: Primary | ICD-10-CM

## 2017-06-10 LAB
ALBUMIN SERPL BCP-MCNC: 3.5 G/DL (ref 3.4–5)
ALBUMIN/GLOB SERPL: 0.8 {RATIO} (ref 0.8–1.7)
ALP SERPL-CCNC: 51 U/L (ref 45–117)
ALT SERPL-CCNC: 24 U/L (ref 13–56)
ANION GAP BLD CALC-SCNC: 6 MMOL/L (ref 3–18)
APTT PPP: 29 SEC (ref 23–36.4)
AST SERPL W P-5'-P-CCNC: 24 U/L (ref 15–37)
BASOPHILS # BLD AUTO: 0 K/UL (ref 0–0.1)
BASOPHILS # BLD: 0 % (ref 0–2)
BILIRUB SERPL-MCNC: 0.4 MG/DL (ref 0.2–1)
BUN SERPL-MCNC: 36 MG/DL (ref 7–18)
BUN/CREAT SERPL: 31 (ref 12–20)
CALCIUM SERPL-MCNC: 9.9 MG/DL (ref 8.5–10.1)
CHLORIDE SERPL-SCNC: 103 MMOL/L (ref 100–108)
CO2 SERPL-SCNC: 31 MMOL/L (ref 21–32)
CREAT SERPL-MCNC: 1.16 MG/DL (ref 0.6–1.3)
DIFFERENTIAL METHOD BLD: ABNORMAL
EOSINOPHIL # BLD: 0.1 K/UL (ref 0–0.4)
EOSINOPHIL NFR BLD: 1 % (ref 0–5)
ERYTHROCYTE [DISTWIDTH] IN BLOOD BY AUTOMATED COUNT: 13.1 % (ref 11.6–14.5)
GLOBULIN SER CALC-MCNC: 4.3 G/DL (ref 2–4)
GLUCOSE SERPL-MCNC: 77 MG/DL (ref 74–99)
HCT VFR BLD AUTO: 36.5 % (ref 35–45)
HGB BLD-MCNC: 12.6 G/DL (ref 12–16)
INR PPP: 1 (ref 0.8–1.2)
LYMPHOCYTES # BLD AUTO: 6 % (ref 21–52)
LYMPHOCYTES # BLD: 0.6 K/UL (ref 0.9–3.6)
MCH RBC QN AUTO: 31.4 PG (ref 24–34)
MCHC RBC AUTO-ENTMCNC: 34.5 G/DL (ref 31–37)
MCV RBC AUTO: 91 FL (ref 74–97)
MONOCYTES # BLD: 0.8 K/UL (ref 0.05–1.2)
MONOCYTES NFR BLD AUTO: 9 % (ref 3–10)
NEUTS SEG # BLD: 7.7 K/UL (ref 1.8–8)
NEUTS SEG NFR BLD AUTO: 84 % (ref 40–73)
PLATELET # BLD AUTO: 251 K/UL (ref 135–420)
PMV BLD AUTO: 10.2 FL (ref 9.2–11.8)
POTASSIUM SERPL-SCNC: 4.7 MMOL/L (ref 3.5–5.5)
PROT SERPL-MCNC: 7.8 G/DL (ref 6.4–8.2)
PROTHROMBIN TIME: 12.6 SEC (ref 11.5–15.2)
RBC # BLD AUTO: 4.01 M/UL (ref 4.2–5.3)
SODIUM SERPL-SCNC: 140 MMOL/L (ref 136–145)
WBC # BLD AUTO: 9.1 K/UL (ref 4.6–13.2)

## 2017-06-10 PROCEDURE — 99285 EMERGENCY DEPT VISIT HI MDM: CPT

## 2017-06-10 PROCEDURE — 85730 THROMBOPLASTIN TIME PARTIAL: CPT | Performed by: EMERGENCY MEDICINE

## 2017-06-10 PROCEDURE — 96374 THER/PROPH/DIAG INJ IV PUSH: CPT

## 2017-06-10 PROCEDURE — 85610 PROTHROMBIN TIME: CPT | Performed by: EMERGENCY MEDICINE

## 2017-06-10 PROCEDURE — 85025 COMPLETE CBC W/AUTO DIFF WBC: CPT | Performed by: EMERGENCY MEDICINE

## 2017-06-10 PROCEDURE — 74011250637 HC RX REV CODE- 250/637: Performed by: EMERGENCY MEDICINE

## 2017-06-10 PROCEDURE — 71101 X-RAY EXAM UNILAT RIBS/CHEST: CPT

## 2017-06-10 PROCEDURE — 74011250636 HC RX REV CODE- 250/636: Performed by: EMERGENCY MEDICINE

## 2017-06-10 PROCEDURE — 77030005514 HC CATH URETH FOL14 BARD -A

## 2017-06-10 PROCEDURE — 80053 COMPREHEN METABOLIC PANEL: CPT | Performed by: EMERGENCY MEDICINE

## 2017-06-10 PROCEDURE — 51702 INSERT TEMP BLADDER CATH: CPT

## 2017-06-10 PROCEDURE — 70450 CT HEAD/BRAIN W/O DYE: CPT

## 2017-06-10 RX ORDER — OXYCODONE HYDROCHLORIDE 5 MG/1
5 TABLET ORAL ONCE
Status: COMPLETED | OUTPATIENT
Start: 2017-06-10 | End: 2017-06-10

## 2017-06-10 RX ORDER — FENTANYL CITRATE 50 UG/ML
12.5 INJECTION, SOLUTION INTRAMUSCULAR; INTRAVENOUS
Status: COMPLETED | OUTPATIENT
Start: 2017-06-10 | End: 2017-06-10

## 2017-06-10 RX ADMIN — OXYCODONE HYDROCHLORIDE 5 MG: 5 TABLET ORAL at 08:17

## 2017-06-10 RX ADMIN — FENTANYL CITRATE 12.5 MCG: 50 INJECTION INTRAMUSCULAR; INTRAVENOUS at 10:24

## 2017-06-10 NOTE — ED PROVIDER NOTES
HPI Comments: 7:53 AM Socorro Roe is a 80 y.o. female with a hx of DM, HTN, COPD, and other noted PMH who presents to the ED via EMS from [address] [place] [address] c/o L rib pain that began this morning when the pt fell. The pt reports she that she was trying to move from her wheel chair to another chair when she fell and hit her L side on the corner of a table. She notes that her pain is exacerbated with deep inhalation and any movement. Pt denies head trauma and LOC. No other complaints or concerns at this time. PCP:  Braxton Cespedes MD      The history is provided by the patient.         Past Medical History:   Diagnosis Date    Abnormality of gait     Acute upper respiratory infections of unspecified site     Broken shoulder     right    Chest pain     Chronic airway obstruction, not elsewhere classified     COPD     Diabetes ([facility] [address]     Essential hypertension, benign     Hearing loss     History of tachycardia     possible SVT    Hypercholesterolemia     Hyperlipidemia     Hypertension     Incontinence     Mitral valve prolapse     MVP (mitral valve prolapse)     OAB (overactive bladder)     Osteoarthritis     spine    Osteoporosis     Pure hypercholesterolemia     Senile osteoporosis     Thyroid disease     Thyrotoxicosis     Type II or unspecified type diabetes mellitus without mention of complication, not stated as uncontrolled     Urinary tract infection, site not specified        Past Surgical History:   Procedure Laterality Date    HX KNEE REPLACEMENT  left    HX OPEN REDUCTION INTERNAL FIXATION Left     patellar fracture    HX OTHER SURGICAL Right     shoulder surgery    REMV CATARACT EXTRACAP,INSERT LENS,COMP  [id number]    bilateral         Family History:   Problem Relation Age of Onset    Diabetes Mother     Alcohol abuse Father    [facility] Arthritis-rheumatoid Sister     Diabetes Brother     Other Brother      eye problems    Hypertension Son     Diabetes Son    [facility] Cancer Son      Pancreatic cancer    Alcohol abuse Son        Social History     Social History    Marital status:      Spouse name: N/A    Number of children: N/A    Years of education: N/A     Occupational History    Not on file. Social History Main Topics    Smoking status: Never Smoker    Smokeless tobacco: Never Used    Alcohol use No    Drug use: No    Sexual activity: No     Other Topics Concern    Not on file     Social History Narrative         ALLERGIES: Nitrofurantoin    Review of Systems   Constitutional: Negative for fever. HENT: Negative for congestion. Respiratory: Negative for cough and shortness of breath. Cardiovascular: Negative for leg swelling. Gastrointestinal: Negative for abdominal pain, nausea and vomiting. Genitourinary: Negative for dysuria. Musculoskeletal: Positive for myalgias (L chest wall). Neurological: Negative for speech difficulty and headaches. All other systems reviewed and are negative. Vitals:    06/10/17 0830 06/10/17 0930 06/10/17 0945 06/10/17 1000   BP: 162/88 172/81 (!) 153/93    Pulse: 90 96 95 95   Resp: 24 20 27 19   Temp:       SpO2: 98% 100% 95% 96%   Weight:       Height:                Physical Exam   Constitutional: She is oriented to person, place, and time. No distress. HENT:   Head: Atraumatic. Eyes: Conjunctivae are normal.   Neck: Normal range of motion. Neck supple. No JVD present. No tracheal deviation present. Cardiovascular: Normal rate, regular rhythm and normal heart sounds. Pulmonary/Chest: Breath sounds normal. She exhibits tenderness (L). She exhibits no crepitus (L). Ecchymosis to L chest wall. Shallow inspirations 2/2 pain. Abdominal: Soft. Bowel sounds are normal. She exhibits no distension. There is no tenderness. There is no rebound and no guarding. Musculoskeletal: She exhibits no edema, tenderness or deformity. Neurological: She is alert and oriented to person, place, and time.  No cranial nerve deficit or sensory deficit. Moves all extremities spontaneously, speech clear,  strength equal bilaterally. No clonus. Skin: Skin is warm and dry. Psychiatric: She has a normal mood and affect. Nursing note and vitals reviewed. MDM  Number of Diagnoses or Management Options  Closed fracture of multiple ribs of left side, initial encounter:   Fall, initial encounter:   Diagnosis management comments: Daquan Zacarias is a 80 y.o. female presenting after fall. Pt with pain and ecchymosis to left rib cage. sats wnl. Pt initially declined iv so po meds given but pain not controlled. Iv placed and will try to control pain. I have discussed results of work up with patient. Pt agrees with plan of transfer. Remained stable, well appearing, no change in neuro exam.     ED Course       Procedures      Vitals:  Patient Vitals for the past 12 hrs:   Temp Pulse Resp BP SpO2   06/10/17 1000 - 95 19 - 96 %   06/10/17 0945 - 95 27 (!) 153/93 95 %   06/10/17 0930 - 96 20 172/81 100 %   06/10/17 0830 - 90 24 162/88 98 %   06/10/17 0815 - 92 20 166/83 98 %   06/10/17 0802 97.8 °F (36.6 °C) 97 13 (!) 156/111 100 %   06/10/17 0800 - 93 19 159/90 99 %   06/10/17 0758 - 95 18 - 99 %   98% Pulse ox reviewed and WNL. X-Ray, CT or other radiology findings or impressions:  CT HEAD WO CONT   Final Result   IMPRESSION:      Left high calvarial postsurgical changes with adjacent 4 mm extra-axial fluid  collection. This may not necessarily be acute. - Slight hyperattenuation within may represent laminar necrosis versus hematoma. However given history of trauma, recommend follow-up in 12 to 24 hours. No mass  effect. No midline shift. XR RIBS LT W PA CXR MIN 3 V    (Results Pending)           Progress notes, Consult notes or additional Procedure notes:   10:28 AM Consult: I discussed care with Dr. Eleuterio Medel ED provider.  It was a standard discussion, including history of patients chief complaint, available diagnostic results, and treatment course. He accepts pt for transfer. 10:29 AM Consult: I discussed care with Dr. Nhi Pardo, [address] [place] [address] surgeon. It was a standard discussion, including history of patients chief complaint, available diagnostic results, and treatment course. He agrees with plan and transfer. Disposition:  Diagnosis:   1. Closed fracture of multiple ribs of left side, initial encounter    2. Fall, initial encounter    3. Elevated blood pressure reading        Disposition: Transferred to [facility] in stable condition. SCRIBE ATTESTATION STATEMENT  Documented by: Nehal thomas for, and in the presence of, Leslie Conrad MD 06/10/17 10:37 AM     Signed by: Qasim Paul, 06/10/17 9:30 AM     PROVIDER ATTESTATION STATEMENT  I personally performed the services described in the documentation, reviewed the documentation, as recorded by the scribe in my presence, and it accurately and completely records my words and actions.   Leslie Conrad MD

## 2017-06-10 NOTE — ED TRIAGE NOTES
Patient to ED by EMS from [facility] with c/c of fall and left-sided rib pain. Patient with history of broken ribs from falls. A+O x 4. Denies any head, neck, or back trauma or loss of consciousness. Patient had ground level fall and hit nightstand with left side of abdomen while trying to get into her wheelchair. Patient home medication lists shows she is on aspirin as only blood thinner.

## 2017-06-14 ENCOUNTER — TELEPHONE (OUTPATIENT)
Dept: INTERNAL MEDICINE CLINIC | Age: 82
End: 2017-06-14

## 2017-06-14 NOTE — TELEPHONE ENCOUNTER
Patient called to let us know that she go discharged from [place] [address] on 6/13/17. She was told that she needed to follow up with Dr Janelle Harmon. I didn't make an appointment for in the office as I was not sure if Dr Janelle Harmon would go to [facility] or not.

## 2017-06-15 ENCOUNTER — PATIENT OUTREACH (OUTPATIENT)
Dept: INTERNAL MEDICINE CLINIC | Age: 82
End: 2017-06-15

## 2017-06-15 RX ORDER — LIDOCAINE 50 MG/G
PATCH TOPICAL EVERY 24 HOURS
COMMUNITY
End: 2018-02-22

## 2017-06-15 RX ORDER — OXYCODONE HYDROCHLORIDE 5 MG/1
5 TABLET ORAL
COMMUNITY
End: 2018-02-22

## 2017-06-15 RX ORDER — ACETAMINOPHEN 325 MG/1
650 TABLET ORAL EVERY 4 HOURS
COMMUNITY
End: 2017-08-16 | Stop reason: SDUPTHER

## 2017-06-15 RX ORDER — NALOXONE HYDROCHLORIDE 4 MG/.1ML
SPRAY NASAL
COMMUNITY
End: 2017-06-30 | Stop reason: ALTCHOICE

## 2017-06-15 NOTE — PROGRESS NOTES
Transitions of Care Coordination    Rob Sheffield was admitted to [facility] 5/2-5/8/17 for a STEMI, BURT and transferred to [facility] for rehab 5/8-6/6/17. Per hospital notes the patient elected to not pursue any cardiac intervention and wishes to be maintained medically. A DDNR is on file. The patient has returned to [facility] where she is a permanent resident. [facility] is ordered for PT and OT. The patient sustained a fall at [facility] on 6/10/17, was seen in [facility] ED the same day and transferred to [facility] 6/10-6/13/17 for left rib fractures. Ms Adriana Lizarraga was discharged back to [facility]. The Discharge Summary written by BÁRBARA Souza on 0/30/83 is below in italics. Discharge Diagnosis:    Left rib fractures    Operative Procedures:     None    Consultants:  Consulting Physicians  Consulting Physician: Md, Cardio Consult    HPI: Rob Sheffield is a 80 y.o. female s/p fall with left sided rib fractures. Hospital Course: Rob Sheffield is a 80 y.o. female admitted to the Trauma Service after falling and sustaining left sided rib fractures. She has had frequent falls and sustained a SDH in August of 2016. She was placed on the chest trauma protocol but refused to wear the CPAP. Cardiology was consulted due to some EKG changes but had not further recommendations and agreed that she could stop Plavix given her frequent falls. They recommended medical management of the EKG changes with ASA. Her hospitalization was uncomplicated. She was evalauted by RT and was getting [id number] on the ICS. PT/OT recommended home with home health. On HD 4 her pain was controlled, she was tolerating a po diet, voiding without difficulty and medically ready for discharge home with home health PT/OT and nursing. Physical Exam:  /76 mmHg  Pulse 80  Temp(Src) 97 °F (36.1 °C)  Resp 16  Ht 5' 1\" (1.549 m)  Wt 44.3 kg (97 lb 10.6 oz)  BMI 18.46 kg/m2  SpO2 97%    Constitutional:  NAD. Awake and alert. Pulmonary: Clear to auscultation bilaterally. No wheezes, rales, or rhonchi. ICS 1000, left chest tenderness  Cardiovascular: Regular rate and rhythm. No murmurs, rubs, or gallops. Abdominal: Soft, NT, ND. Extremities:Warm, well perfused. No edema. Relevant labs within last 72 hrs:    BMP:    Recent Labs       06/12/17   0758   06/11/17   0455   06/10/17   1300    GLUCOSE   108*   197*   68    CALCIUM   9.4   8.7   9.9    BUN   33*   39*   35*    CREAT   1.1   1.0   0.9    NA   140   134   141    POTASSIUM   4.6   5.1   5.7*    CHLORIDE   103   98   101    CO2   24   23   27      CBC:     Recent Labs       06/12/17 0758   06/11/17   0455   06/10/17   1300    WBC   7.2   5.0   8.9    RBC   3.56*   3.50*   3.11*    HEMOGLOBIN   11.5*   10.6*   12.0    HCT   34.2*   32.5*   30.1*    MCV   96*   93   97*    MCH   32   30   39*    MCHC   34   33   40*    PLATELET   255   767   247    RDW   13.2   13.1   13.2        Significant Diagnostic Studies:       CXR:    Rib fractures 7-10, no ptx      Result Impression     IMPRESSION:  Minimally to nondisplaced acute fractures of the left 7 through  10th posterior ribs. Result Narrative     EXAM:  XR RIBS LT W PA CXR MIN 3 V    INDICATION:   fall, pain in left rib cage    COMPARISON: Chest portable 5/17    FINDINGS: A frontal radiograph of the chest and 3 oblique views of the left ribs  demonstrate minimally to nondisplaced acute fractures of the left 7 through 10th  posterior ribs. Cardiac silhouette and pulmonary vascularity are stable. Biapical pleural parenchymal changes. Left lung base nodular opacity is not  visualized on today's study. Similar right upper lung field density. Previously  nonemergent cross-sectional imaging was recommended. Apparent retrocardiac  elongated density again noted. Old right humeral fracture again noted.          FAST: Negative    CT Head:     Result Impression     IMPRESSION:     Left high calvarial postsurgical changes with adjacent 4 mm extra-axial fluid  collection. This may not necessarily be acute. - Slight hyperattenuation within may represent laminar necrosis versus hematoma. However given history of trauma, recommend follow-up in 12 to 24 hours. No mass  effect. No midline shift. Result Narrative     INDICATION: Trauma    COMPARISON: None    TECHNIQUE: Unenhanced CT of the head was performed using 5 mm images. Brain and  bone windows were generated. All CT scans at this facility are performed using  doze optimization technique as appropriate to a performed exam, to include  automated exposure control, adjustment of the mA and/or KV according to patient  size (including appropriate matching for site specific examinations), or use of  iterative reconstruction technique. FINDINGS:    Moderate global volume loss. Marked periventricular and subcortical white matter  hypoattenuation, presumed chronic microvascular ischemic change. Probable old  infarct left high parietal region. Adjacent to the postsurgical changes on the  left, there is small amount of low-density extra-axial fluid collection (4 mm)  with slight hyperattenuation within. There is no additional area of intracranial  hemorrhage, mass, mass effect or midline shift. The basilar cisterns are open. No acute infarct is identified. Postsurgical changes left calvarium. Multiple  oval lucencies posterior mid calvarium, nonspecific given no priors. Visualized  portions of the paranasal sinuses and mastoid air cells are clear. CXR 6/11:       1. No significant interval change in left-sided rib fractures. 2. Left lung base atelectasis. Possible right lung base atelectasis. Discharge: [facility]. .. Signs and Symptoms    Notify Trauma Clinic at [phone] for signs and symptoms: severe shortness of breath, severe chest pain or temperature GREATER than 101.5 F.     Please call [phone] after hours or on weekends with any questions or concerns. Discharge: Burn / Trauma Surgery Office    Follow up with BÁRBARA Singh in 2 weeks. The Trauma Surgery Office is located in [facility] (6th floor)[address] [facility]. Please call [phone] to schedule your appointment. Discharge: Follow Up with Primary Care Physician     Call to [facility]. Reached nurse, Gita Yu, who stated patient is still sore but otherwise doing OK. Reviewed medications. Nurse was unable to provide details of patient's fall. Will follow.

## 2017-06-16 ENCOUNTER — TELEPHONE (OUTPATIENT)
Dept: INTERNAL MEDICINE CLINIC | Age: 82
End: 2017-06-16

## 2017-06-16 DIAGNOSIS — K59.00 CONSTIPATION, UNSPECIFIED CONSTIPATION TYPE: Primary | ICD-10-CM

## 2017-06-16 NOTE — TELEPHONE ENCOUNTER
Needs a order for something sent to [facility] to help with her Constipation as soon as possible please call her with any questions [phone]

## 2017-06-16 NOTE — TELEPHONE ENCOUNTER
Spoke with [facility]--patient takes miralax once daily but doesn't think it's helping enough. Staff states they can increase to twice daily if Dr Layla Steward ok's it.

## 2017-06-19 ENCOUNTER — HOME VISIT (OUTPATIENT)
Dept: INTERNAL MEDICINE CLINIC | Age: 82
End: 2017-06-19

## 2017-06-19 VITALS — HEART RATE: 98 BPM | SYSTOLIC BLOOD PRESSURE: 102 MMHG | DIASTOLIC BLOOD PRESSURE: 50 MMHG

## 2017-06-19 DIAGNOSIS — S22.42XA CLOSED FRACTURE OF MULTIPLE RIBS OF LEFT SIDE, INITIAL ENCOUNTER: ICD-10-CM

## 2017-06-19 DIAGNOSIS — H01.001 BLEPHARITIS OF UPPER EYELIDS OF BOTH EYES, UNSPECIFIED TYPE: Primary | ICD-10-CM

## 2017-06-19 DIAGNOSIS — E11.9 CONTROLLED TYPE 2 DIABETES MELLITUS WITHOUT COMPLICATION, WITH LONG-TERM CURRENT USE OF INSULIN (HCC): ICD-10-CM

## 2017-06-19 DIAGNOSIS — H01.004 BLEPHARITIS OF UPPER EYELIDS OF BOTH EYES, UNSPECIFIED TYPE: Primary | ICD-10-CM

## 2017-06-19 DIAGNOSIS — Z79.4 CONTROLLED TYPE 2 DIABETES MELLITUS WITHOUT COMPLICATION, WITH LONG-TERM CURRENT USE OF INSULIN (HCC): ICD-10-CM

## 2017-06-19 DIAGNOSIS — K59.03 DRUG-INDUCED CONSTIPATION: ICD-10-CM

## 2017-06-19 NOTE — PROGRESS NOTES
The patient is being evaluated by me today at [address] [place] [address] for the chief complaint of InfectedEye Lids    HPI     The patient  was seen today for follow up of fracture of multiple left ribs that occurred in a fall. The patient persists with pain on her left side. This pain is increased with movement or deep breaths. The patient's chief complaint is of irritation of her eyelids. The patient remains on Lantus plus sliding scale insulin for type II diabetes mellitus. This is in fair control. Review of Systems   Respiratory: Negative for shortness of breath. Cardiovascular: Positive for chest pain (Rib pain). Negative for leg swelling. Allergies   Allergen Reactions    Nitrofurantoin Nausea and Vomiting       Current Outpatient Prescriptions   Medication Sig Dispense Refill    oxyCODONE IR (ROXICODONE) 5 mg immediate release tablet Take 5 mg by mouth every six (6) hours as needed for Pain (Take 1/2 tablet every 6 hours as needed for pain).  lidocaine (LIDODERM) 5 % by TransDERmal route every twenty-four (24) hours. Apply patch to the affected area for 12 hours a day and remove for 12 hours a day.  acetaminophen (TYLENOL) 325 mg tablet Take 650 mg by mouth every four (4) hours.  naphazoline-pheniramine (VISINE-A) 0.025-0.3 % ophthalmic solution Administer  to both eyes two (2) times daily as needed.  cetirizine (ZYRTEC) 10 mg tablet Take  by mouth daily.  insulin aspart (NOVOLOG) 100 unit/mL injection by SubCUTAneous route. Indications: sliding scale      SOD PHOS,M-B/NA PHOS,DI-BA (FLEET ENEMA RE) Insert  into rectum.  amLODIPine (NORVASC) 5 mg tablet Take 1 Tab by mouth daily. 30 Tab 0    aspirin 81 mg chewable tablet Take 1 Tab by mouth daily. 30 Tab 1    clopidogrel (PLAVIX) 75 mg tab Take 1 Tab by mouth daily.  30 Tab 1    insulin lispro (HUMALOG) 100 unit/mL injection For Blood Sugar (mg/dL) of:     Less than 150 =   0 units           150 -199 =   2 units  200 -249 =   4 units  250 -299 =   6 units  300 -349 =   8 units  350 and above = 10 units and Call Physician 1 Vial 0    insulin glargine (LANTUS) 100 unit/mL injection 5 Units by SubCUTAneous route daily (with breakfast).  nateglinide (STARLIX) 60 mg tablet Take 60 mg by mouth Before breakfast, lunch, and dinner.  ascorbic acid, vitamin C, (VITAMIN C) 250 mg tablet Take  by mouth.  acetaminophen (TYLENOL) 650 mg suppository Insert  into rectum every four (4) hours as needed for Fever.  ferrous sulfate (FEOSOL) 325 mg (65 mg iron) tablet Take  by mouth Daily (before breakfast).  polyethylene glycol (MIRALAX) 17 gram packet Take 17 g by mouth daily.  TRUEPLUS LANCETS 30 gauge misc Use to check blood glucose up to twice daily DX:e11.9 300 Lancet 3    glimepiride (AMARYL) 2 mg tablet 1 tablet at breakfast.  1 tablet at supper 180 Tab 3    alendronate (FOSAMAX) 70 mg tablet TAKE 1 TABLET WEEKLY 12 Tab 3    lovastatin (MEVACOR) 20 mg tablet TAKE 1 TABLET DAILY 90 Tab 3    ASCENSIA CONTOUR strip USE TO TEST BLOOD SUGARS THREE TIMES DAILY (Patient taking differently: USE TO TEST BLOOD SUGARS THREE TIMES DAILY--embrace strips) 100 Strip 11    melatonin 3 mg tablet Take 3 mg by mouth nightly as needed.  methimazole (TAPAZOLE) 5 mg tablet Take 1 Tab by mouth daily. 90 Tab 3    cyanocobalamin (VITAMIN B-12) 1,000 mcg tablet Take 1,000 mcg by mouth daily.  omega-3 fatty acids-vitamin e (FISH OIL) 1,000 mg Cap Take 1 Cap by mouth two (2) times a day.  acetaminophen (TYLENOL) 325 mg tablet Take 650 mg by mouth every six (6) hours as needed for Pain.       oxybutynin chloride XL (DITROPAN XL) 10 mg CR tablet TAKE 1 TABLET BY MOUTH AT BEDTIME FOR BLADDER CONTGROL 30 Tab 0       Past Medical History:   Diagnosis Date    Abnormality of gait     Acute upper respiratory infections of unspecified site     Broken shoulder     right    Chest pain     Chronic airway obstruction, not elsewhere classified     COPD     Diabetes ([facility] [address]     Essential hypertension, benign     Hearing loss     History of tachycardia     possible SVT    Hypercholesterolemia     Hyperlipidemia     Hypertension     Incontinence     Mitral valve prolapse     MVP (mitral valve prolapse)     OAB (overactive bladder)     Osteoarthritis     spine    Osteoporosis     Pure hypercholesterolemia     Senile osteoporosis     Thyroid disease     Thyrotoxicosis     Type II or unspecified type diabetes mellitus without mention of complication, not stated as uncontrolled     Urinary tract infection, site not specified        Past Surgical History:   Procedure Laterality Date    HX KNEE REPLACEMENT  left    HX OPEN REDUCTION INTERNAL FIXATION Left     patellar fracture    HX OTHER SURGICAL Right     shoulder surgery    REMV CATARACT EXTRACAP,INSERT LENS,COMP  [id number]    bilateral       Social History     Social History    Marital status:      Spouse name: N/A    Number of children: N/A    Years of education: N/A     Occupational History    Not on file. Social History Main Topics    Smoking status: Never Smoker    Smokeless tobacco: Never Used    Alcohol use No    Drug use: No    Sexual activity: No     Other Topics Concern    Not on file     Social History Narrative       Patient does have an advanced directive on file    Physical Exam   Cardiovascular: Normal rate and regular rhythm. Exam reveals no gallop. No murmur heard. Pulmonary/Chest: She has no wheezes. She has no rales. Abdominal: Soft. Bowel sounds are normal. She exhibits no distension and no mass. There is no tenderness. Musculoskeletal: She exhibits no edema.    Left chest tenderness    Admission on 06/10/2017, Discharged on 06/10/2017   Component Date Value Ref Range Status    WBC 06/10/2017 9.1  4.6 - 13.2 K/uL Final    RBC 06/10/2017 4.01* 4.20 - 5.30 M/uL Final    HGB 06/10/2017 12.6  12.0 - 16.0 g/dL Final    Specimen incubated at 37 degrees to correct for possible cold agglutinin    HCT 06/10/2017 36.5  35.0 - 45.0 % Final    MCV 06/10/2017 91.0  74.0 - 97.0 FL Final    MCH 06/10/2017 31.4  24.0 - 34.0 PG Final    Specimen incubated at 37 degrees to correct for possible cold agglutinin    MCHC 06/10/2017 34.5  31.0 - 37.0 g/dL Final    Specimen incubated at 37 degrees to correct for possible cold agglutinin    RDW 06/10/2017 13.1  11.6 - 14.5 % Final    PLATELET 25/58/4050 168  135 - 420 K/uL Final    MPV 06/10/2017 10.2  9.2 - 11.8 FL Final    NEUTROPHILS 06/10/2017 84* 40 - 73 % Final    LYMPHOCYTES 06/10/2017 6* 21 - 52 % Final    MONOCYTES 06/10/2017 9  3 - 10 % Final    EOSINOPHILS 06/10/2017 1  0 - 5 % Final    BASOPHILS 06/10/2017 0  0 - 2 % Final    ABS. NEUTROPHILS 06/10/2017 7.7  1.8 - 8.0 K/UL Final    ABS. LYMPHOCYTES 06/10/2017 0.6* 0.9 - 3.6 K/UL Final    ABS. MONOCYTES 06/10/2017 0.8  0.05 - 1.2 K/UL Final    ABS. EOSINOPHILS 06/10/2017 0.1  0.0 - 0.4 K/UL Final    ABS. BASOPHILS 06/10/2017 0.0  0.0 - 0.1 K/UL Final    DF 06/10/2017 AUTOMATED    Final    Sodium 06/10/2017 140  136 - 145 mmol/L Final    Potassium 06/10/2017 4.7  3.5 - 5.5 mmol/L Final    Chloride 06/10/2017 103  100 - 108 mmol/L Final    CO2 06/10/2017 31  21 - 32 mmol/L Final    Anion gap 06/10/2017 6  3.0 - 18 mmol/L Final    Glucose 06/10/2017 77  74 - 99 mg/dL Final    BUN 06/10/2017 36* 7.0 - 18 MG/DL Final    Creatinine 06/10/2017 1.16  0.6 - 1.3 MG/DL Final    BUN/Creatinine ratio 06/10/2017 31* 12 - 20   Final    GFR est AA 06/10/2017 53* >60 ml/min/1.73m2 Final    GFR est non-AA 06/10/2017 44* >60 ml/min/1.73m2 Final    Comment: (NOTE)  Estimated GFR is calculated using the Modification of Diet in Renal   Disease (MDRD) Study equation, reported for both  Americans   (GFRAA) and non- Americans (GFRNA), and normalized to 1.73m2   body surface area.  The physician must decide which value applies to   the patient. The MDRD study equation should only be used in   individuals age 25 or older. It has not been validated for the   following: pregnant women, patients with serious comorbid conditions,   or on certain medications, or persons with extremes of body size,   muscle mass, or nutritional status.  Calcium 06/10/2017 9.9  8.5 - 10.1 MG/DL Final    Bilirubin, total 06/10/2017 0.4  0.2 - 1.0 MG/DL Final    ALT (SGPT) 06/10/2017 24  13 - 56 U/L Final    AST (SGOT) 06/10/2017 24  15 - 37 U/L Final    Alk.  phosphatase 06/10/2017 51  45 - 117 U/L Final    Protein, total 06/10/2017 7.8  6.4 - 8.2 g/dL Final    Albumin 06/10/2017 3.5  3.4 - 5.0 g/dL Final    Globulin 06/10/2017 4.3* 2.0 - 4.0 g/dL Final    A-G Ratio 06/10/2017 0.8  0.8 - 1.7   Final    Prothrombin time 06/10/2017 12.6  11.5 - 15.2 sec Final    INR 06/10/2017 1.0  0.8 - 1.2   Final    Comment:            INR Therapeutic Ranges         (on stable oral anticoagulant):     INDICATION                INR  DVT/PE/Atrial Fib          2.0-3.0  MI/Mechanical Heart Valve  2.5-3.5      aPTT 06/10/2017 29.0  23.0 - 36.4 [address] Final   Hospital Outpatient Visit on 06/02/2017   Component Date Value Ref Range Status    Color 06/02/2017 YELLOW    Final    Appearance 06/02/2017 CLOUDY    Final    Specific gravity 06/02/2017 1.013  1.005 - 1.030   Final    pH (UA) 06/02/2017 6.0  5.0 - 8.0   Final    Protein 06/02/2017 NEGATIVE   NEG mg/dL Final    Glucose 06/02/2017 NEGATIVE   NEG mg/dL Final    Ketone 06/02/2017 NEGATIVE   NEG mg/dL Final    Bilirubin 06/02/2017 NEGATIVE   NEG   Final    Blood 06/02/2017 TRACE* NEG   Final    Urobilinogen 06/02/2017 0.2  0.2 - 1.0 EU/dL Final    Nitrites 06/02/2017 POSITIVE* NEG   Final    Leukocyte Esterase 06/02/2017 LARGE* NEG   Final    Special Requests: 06/02/2017 NO SPECIAL REQUESTS    Final    Culture result: 06/02/2017 *   Final Value:>100,000  COLONIES/mL  KLEBSIELLA PNEUMONIAE      WBC 06/02/2017 TOO NUMEROUS TO COUNT  0 - 4 /hpf Final    RBC 06/02/2017 0 to 3  0 - 5 /hpf Final    Epithelial cells 06/02/2017 1+  0 - 5 /lpf Final    Bacteria 06/02/2017 4+* NEG /hpf Final   Hospital Outpatient Visit on 06/01/2017   Component Date Value Ref Range Status    WBC 06/01/2017 5.2  4.6 - 13.2 K/uL Final    RBC 06/01/2017 3.60* 4.20 - 5.30 M/uL Final    HGB 06/01/2017 11.0* 12.0 - 16.0 g/dL Final    Specimen incubated at 37 degrees to correct for possible cold agglutinin    HCT 06/01/2017 32.8* 35.0 - 45.0 % Final    Specimen incubated at 37 degrees to correct for possible cold agglutinin    MCV 06/01/2017 91.1  74.0 - 97.0 FL Final    MCH 06/01/2017 30.6  24.0 - 34.0 PG Final    Specimen incubated at 37 degrees to correct for possible cold agglutinin    MCHC 06/01/2017 33.5  31.0 - 37.0 g/dL Final    Specimen incubated at 37 degrees to correct for possible cold agglutinin    RDW 06/01/2017 13.3  11.6 - 14.5 % Final    PLATELET 98/44/2382 950  135 - 420 K/uL Final    MPV 06/01/2017 10.6  9.2 - 11.8 FL Final    NEUTROPHILS 06/01/2017 62  40 - 73 % Final    LYMPHOCYTES 06/01/2017 20* 21 - 52 % Final    MONOCYTES 06/01/2017 16* 3 - 10 % Final    EOSINOPHILS 06/01/2017 2  0 - 5 % Final    BASOPHILS 06/01/2017 0  0 - 2 % Final    ABS. NEUTROPHILS 06/01/2017 3.2  1.8 - 8.0 K/UL Final    ABS. LYMPHOCYTES 06/01/2017 1.0  0.9 - 3.6 K/UL Final    ABS. MONOCYTES 06/01/2017 0.8  0.05 - 1.2 K/UL Final    ABS. EOSINOPHILS 06/01/2017 0.1  0.0 - 0.4 K/UL Final    ABS.  BASOPHILS 06/01/2017 0.0  0.0 - 0.1 K/UL Final    DF 06/01/2017 AUTOMATED    Final   Hospital Outpatient Visit on 06/01/2017   Component Date Value Ref Range Status    Sodium 06/02/2017 136  136 - 145 mmol/L Final    Potassium 06/02/2017 4.5  3.5 - 5.5 mmol/L Final    Chloride 06/02/2017 103  100 - 108 mmol/L Final    CO2 06/02/2017 27  21 - 32 mmol/L Final    Anion gap 06/02/2017 6  3.0 - 18 mmol/L Final    Glucose 06/02/2017 156* 74 - 99 mg/dL Final    BUN 06/02/2017 44* 7.0 - 18 MG/DL Final    Creatinine 06/02/2017 1.24  0.6 - 1.3 MG/DL Final    BUN/Creatinine ratio 06/02/2017 35* 12 - 20   Final    GFR est AA 06/02/2017 49* >60 ml/min/1.73m2 Final    GFR est non-AA 06/02/2017 41* >60 ml/min/1.73m2 Final    Comment: (NOTE)  Estimated GFR is calculated using the Modification of Diet in Renal   Disease (MDRD) Study equation, reported for both  Americans   (GFRAA) and non- Americans (GFRNA), and normalized to 1.73m2   body surface area. The physician must decide which value applies to   the patient. The MDRD study equation should only be used in   individuals age 25 or older. It has not been validated for the   following: pregnant women, patients with serious comorbid conditions,   or on certain medications, or persons with extremes of body size,   muscle mass, or nutritional status.  Calcium 06/02/2017 9.2  8.5 - 10.1 MG/DL Final    Bilirubin, total 06/02/2017 0.2  0.2 - 1.0 MG/DL Final    ALT (SGPT) 06/02/2017 23  13 - 56 U/L Final    AST (SGOT) 06/02/2017 19  15 - 37 U/L Final    Alk. phosphatase 06/02/2017 49  45 - 117 U/L Final    Protein, total 06/02/2017 6.8  6.4 - 8.2 g/dL Final    Albumin 06/02/2017 3.3* 3.4 - 5.0 g/dL Final    Globulin 06/02/2017 3.5  2.0 - 4.0 g/dL Final    A-G Ratio 06/02/2017 0.9  0.8 - 1.7   Final    TSH 06/02/2017 4.76* 0.36 - 3.74 uIU/mL Final   Admission on 05/02/2017, Discharged on 05/08/2017   No results displayed because visit has over 200 results.       Hospital Outpatient Visit on 03/22/2017   Component Date Value Ref Range Status    WBC 03/22/2017 5.2  4.6 - 13.2 K/uL Final    RBC 03/22/2017 3.00* 4.20 - 5.30 M/uL Final    HGB 03/22/2017 11.5* 12.0 - 16.0 g/dL Final    HCT 03/22/2017 28.8* 35.0 - 45.0 % Final    MCV 03/22/2017 96.0  74.0 - 97.0 FL Final    MCH 03/22/2017 38.3* 24.0 - 34.0 PG Final    MCHC 03/22/2017 39.9* 31.0 - 37.0 g/dL Final    RDW 03/22/2017 14.2  11.6 - 14.5 % Final    PLATELET 38/27/5240 728  135 - 420 K/uL Final    MPV 03/22/2017 10.7  9.2 - 11.8 FL Final    NEUTROPHILS 03/22/2017 72  40 - 73 % Final    LYMPHOCYTES 03/22/2017 13* 21 - 52 % Final    MONOCYTES 03/22/2017 13* 3 - 10 % Final    EOSINOPHILS 03/22/2017 2  0 - 5 % Final    BASOPHILS 03/22/2017 0  0 - 2 % Final    ABS. NEUTROPHILS 03/22/2017 3.7  1.8 - 8.0 K/UL Final    ABS. LYMPHOCYTES 03/22/2017 0.7* 0.9 - 3.6 K/UL Final    ABS. MONOCYTES 03/22/2017 0.7  0.05 - 1.2 K/UL Final    ABS. EOSINOPHILS 03/22/2017 0.1  0.0 - 0.4 K/UL Final    ABS. BASOPHILS 03/22/2017 0.0  0.0 - 0.06 K/UL Final    DF 03/22/2017 AUTOMATED    Final    Triiodothyronine (T3), free 03/22/2017 2.6  2.3 - 4.2 PG/ML Final    T4, Free 03/22/2017 0.7  0.7 - 1.5 NG/DL Final    Sodium 03/22/2017 136  136 - 145 mmol/L Final    Potassium 03/22/2017 4.9  3.5 - 5.5 mmol/L Final    Chloride 03/22/2017 100  100 - 108 mmol/L Final    CO2 03/22/2017 29  21 - 32 mmol/L Final    Anion gap 03/22/2017 7  3.0 - 18 mmol/L Final    Glucose 03/22/2017 219* 74 - 99 mg/dL Final    BUN 03/22/2017 38* 7.0 - 18 MG/DL Final    Creatinine 03/22/2017 1.20  0.6 - 1.3 MG/DL Final    BUN/Creatinine ratio 03/22/2017 32* 12 - 20   Final    GFR est AA 03/22/2017 51* >60 ml/min/1.73m2 Final    GFR est non-AA 03/22/2017 42* >60 ml/min/1.73m2 Final    Comment: (NOTE)  Estimated GFR is calculated using the Modification of Diet in Renal   Disease (MDRD) Study equation, reported for both  Americans   (GFRAA) and non- Americans (GFRNA), and normalized to 1.73m2   body surface area. The physician must decide which value applies to   the patient. The MDRD study equation should only be used in   individuals age 25 or older.  It has not been validated for the   following: pregnant women, patients with serious comorbid conditions,   or on certain medications, or persons with extremes of body size,   muscle mass, or nutritional status.  Calcium 03/22/2017 9.4  8.5 - 10.1 MG/DL Final    Bilirubin, total 03/22/2017 0.5  0.2 - 1.0 MG/DL Final    ALT (SGPT) 03/22/2017 23  13 - 56 U/L Final    AST (SGOT) 03/22/2017 19  15 - 37 U/L Final    Alk. phosphatase 03/22/2017 50  45 - 117 U/L Final    Protein, total 03/22/2017 7.3  6.4 - 8.2 g/dL Final    Albumin 03/22/2017 3.5  3.4 - 5.0 g/dL Final    Globulin 03/22/2017 3.8  2.0 - 4.0 g/dL Final    A-G Ratio 03/22/2017 0.9  0.8 - 1.7   Final    TSH 03/22/2017 4.41* 0.36 - 3.74 uIU/mL Final    Hemoglobin A1c 03/22/2017 7.3* 4.2 - 5.6 % Final    Comment: (NOTE)  HbA1C Interpretive Ranges  <5.7              Normal  5.7 - 6.4         Consider Prediabetes  >6.5              Consider Diabetes      Est. average glucose 03/22/2017 163  mg/dL Final    Comment: (NOTE)  The eAG should be interpreted with patient characteristics in mind   since ethnicity, interindividual differences, red cell lifespan,   variation in rates of glycation, etc. may affect the validity of the   calculation. .No results found for any visits on 06/19/17. Assessment / Plan:      ICD-10-CM ICD-9-CM    1. Blepharitis of upper eyelids of both eyes, unspecified type H01.001 373.00     H01.004     2. Closed fracture of multiple ribs of left side, initial encounter S22.42XA 807.09    3. Drug-induced constipation K59.03 564.09      E980.5    4. Controlled type 2 diabetes mellitus without complication, with long-term current use of insulin (HCC) E11.9 250.00     Z79.4 V58.67      Keflex   Cipro opth  she was advised to continue her maintenance medications    Follow up: One month    I asked Mellissa Baker if she has any questions and I answered the questions.   Mellissa Baker states that she understands the treatment plan and agrees with the treatment plan    LOS:  [id number]

## 2017-06-26 ENCOUNTER — PATIENT OUTREACH (OUTPATIENT)
Dept: INTERNAL MEDICINE CLINIC | Age: 82
End: 2017-06-26

## 2017-06-26 NOTE — PROGRESS NOTES
Transitions of Care Coordination      Follow for fall on 6/10/17 and admission to [facility] 6/10-6/13/17 for left rib fractures. Ms Rukhsana Edouard was discharged back to [facility]. Call to [facility]. Reached , Elvira, and identified self/role. Per patient's aid patient is doing well. No concerns or problems identified. Jono follow.

## 2017-06-29 ENCOUNTER — TELEPHONE (OUTPATIENT)
Dept: INTERNAL MEDICINE CLINIC | Age: 82
End: 2017-06-29

## 2017-06-29 NOTE — TELEPHONE ENCOUNTER
Patient called requesting Sarah Monsivais call her she had a question concerning her insulin.  #[phone]

## 2017-06-29 NOTE — TELEPHONE ENCOUNTER
Informed that patient wants to stop one of her insulins due to cost--per Dr Negrita Baptiste patient needs to continue all insulins due to her diabetic control

## 2017-07-05 ENCOUNTER — PATIENT OUTREACH (OUTPATIENT)
Dept: INTERNAL MEDICINE CLINIC | Age: 82
End: 2017-07-05

## 2017-07-05 VITALS
HEART RATE: 90 BPM | SYSTOLIC BLOOD PRESSURE: 104 MMHG | DIASTOLIC BLOOD PRESSURE: 58 MMHG | WEIGHT: 98 LBS | BODY MASS INDEX: 17.92 KG/M2

## 2017-07-05 PROBLEM — M54.2 NECK PAIN: Status: ACTIVE | Noted: 2017-07-05

## 2017-07-05 NOTE — PROGRESS NOTES
Transitions of Care Coordination    Follow for fall on 6/10/17 and admission to [facility] 6/10-6/13/17 for left rib fractures.  Jennifer Rao was discharged back to [facility]. Call to [facility]. Reached nurse, Jenni Salas, and identified self/role. Per nurse patient is \"getting along just fine. \"  Nurse stated patient is recovering from rib fractures and is not in significant pain. Patient went to the dining [employer]itt[employer] for breakfast this morning. No problems or concerns identified. Will follow.

## 2017-07-14 ENCOUNTER — PATIENT OUTREACH (OUTPATIENT)
Dept: INTERNAL MEDICINE CLINIC | Age: 82
End: 2017-07-14

## 2017-07-14 NOTE — PROGRESS NOTES
Transitions of Care Coordination    Follow for fall on 6/10/17 and admission to [facility] 6/10-6/13/17 for left rib fractures.   Ms Dlalas Robertson was discharged back to [facility]. Call to [facility]. Reached nurse, Gustavo, and identified self/role. Per nurse patient is doing well. No problems or concerns identified. Goals met. Episode resolved:  No hospitalization or ED visit 30 days from discharge on 6/13/17.

## 2017-07-25 DIAGNOSIS — K59.00 CONSTIPATION, UNSPECIFIED CONSTIPATION TYPE: Primary | ICD-10-CM

## 2017-07-25 NOTE — TELEPHONE ENCOUNTER
Calling for an order for suppository, she has tried everything but has not helped, she is miserable. She is at [facility].  Please let her know, [phone]

## 2017-07-26 RX ORDER — FACIAL-BODY WIPES
10 EACH TOPICAL DAILY
Qty: 30 SUPPOSITORY | Refills: 1 | Status: SHIPPED | OUTPATIENT
Start: 2017-07-26 | End: 2018-08-06 | Stop reason: DRUGHIGH

## 2017-08-07 RX ORDER — AZELASTINE HYDROCHLORIDE 0.5 MG/ML
SOLUTION/ DROPS OPHTHALMIC
Qty: 6 ML | Refills: 0 | Status: SHIPPED | OUTPATIENT
Start: 2017-08-07 | End: 2017-08-22 | Stop reason: SDUPTHER

## 2017-08-07 NOTE — TELEPHONE ENCOUNTER
Patient called asking Dr Tay Burnett if he can send something to the pharmacy for her itching eyes they are also red. Please call with any question.  (She wanted Dr Tay Burnett to know that Dr Lee Ann Crain had told her to put hot compresses on her eyes which she thinks irritated it even more )

## 2017-08-22 RX ORDER — AZELASTINE HYDROCHLORIDE 0.5 MG/ML
SOLUTION/ DROPS OPHTHALMIC
Qty: 6 ML | Refills: 0 | Status: SHIPPED | OUTPATIENT
Start: 2017-08-22 | End: 2018-02-22

## 2017-08-22 NOTE — TELEPHONE ENCOUNTER
Requested Prescriptions     Pending Prescriptions Disp Refills    azelastine (OPTIVAR) 0.05 % ophthalmic solution 6 mL 0     Sig: Instill 1 drop in both eyes twice daily for itchy eyes     Note:  Patient states she saw eye doctor and he didn't mention the drops but she feels she needs them. Also, she takes probiotics, one daily but says they are packaged as 2. She thinks she should take both of them together. Please advise.

## 2017-08-28 ENCOUNTER — TELEPHONE (OUTPATIENT)
Dept: INTERNAL MEDICINE CLINIC | Age: 82
End: 2017-08-28

## 2017-09-06 ENCOUNTER — TELEPHONE (OUTPATIENT)
Dept: INTERNAL MEDICINE CLINIC | Age: 82
End: 2017-09-06

## 2017-09-06 NOTE — TELEPHONE ENCOUNTER
Wants order to Discontinue Probiotics when she runs out she no longer wants to take it she needs order to stop sent to [facility]

## 2017-10-09 ENCOUNTER — TELEPHONE (OUTPATIENT)
Dept: INTERNAL MEDICINE CLINIC | Age: 82
End: 2017-10-09

## 2017-10-09 NOTE — TELEPHONE ENCOUNTER
Patient would like to know when Dr. Shalini Turcios will be out to see her. Also, she is still having problems with her bowels. She was taking Ducolax, but the facility has taken it and won't let her continue to take it because she does not have an order for it.

## 2017-10-26 NOTE — TELEPHONE ENCOUNTER
Patient call wanting to know if she is currently on any Med for Bladder control also is she on something for anemia.  She would like someone to call her at [phone]

## 2017-10-26 NOTE — TELEPHONE ENCOUNTER
Unable to reach patient via phone or leave message      Patient is already on iron supp but no bladder control medication that I can see.

## 2017-10-27 RX ORDER — OXYBUTYNIN CHLORIDE 5 MG/1
5 TABLET ORAL
Qty: 30 TAB | Refills: 5 | Status: SHIPPED | OUTPATIENT
Start: 2017-10-27 | End: 2018-05-05 | Stop reason: ALTCHOICE

## 2017-10-27 NOTE — TELEPHONE ENCOUNTER
Patient returned call and was made aware of message. Patient voiced understanding but added that she has complaints of frequent urination at night and would like an order for a bladder control medication and a prn order for ducolax as she continues to have trouble with little to no bowel movements. Patient added that she would still like to come off diabetic medications to which she has been advised she needs to continue to keep levels stable. Order for ducolax already on MAR.  Oxybutynin order awaiting approval.

## 2017-11-03 ENCOUNTER — TELEPHONE (OUTPATIENT)
Dept: INTERNAL MEDICINE CLINIC | Age: 82
End: 2017-11-03

## 2017-11-03 NOTE — TELEPHONE ENCOUNTER
Patient called the office today requesting that orders again be sent to [address] [place] [address] for oral dulcolax and for bladder control medication. Advised patient that bladder control medication had been taken care of but since patient believes she has not been receiving it new order was typed and faxed to [facility].

## 2017-12-08 ENCOUNTER — TELEPHONE (OUTPATIENT)
Dept: INTERNAL MEDICINE CLINIC | Age: 82
End: 2017-12-08

## 2017-12-08 DIAGNOSIS — R35.0 URINARY FREQUENCY: Primary | ICD-10-CM

## 2017-12-08 NOTE — TELEPHONE ENCOUNTER
Patient returned call stating that she is having increased trouble with urinary incontinence and questions what to do next. Advised patient that she could watch how late at night she's drinking fluids (ie: try to cut off/back between 8 and 10 pm) to see if that helps. Added that I would check with Dr Davila How to see if he would like to increase ditropan when he returns to the office on 12/11--patient voiced understanding.

## 2017-12-11 NOTE — TELEPHONE ENCOUNTER
Per Dr Pedro Calhoun patient needs to be see by urology--will not change medications at this time.     Referral placed for Dr Lina Bess per Dr Pasha Gardiner request

## 2018-01-15 ENCOUNTER — OFFICE VISIT (OUTPATIENT)
Dept: UROLOGY | Age: 83
End: 2018-01-15

## 2018-01-15 VITALS
DIASTOLIC BLOOD PRESSURE: 76 MMHG | OXYGEN SATURATION: 98 % | SYSTOLIC BLOOD PRESSURE: 148 MMHG | HEIGHT: 61 IN | HEART RATE: 80 BPM

## 2018-01-15 DIAGNOSIS — R32 URINARY INCONTINENCE, UNSPECIFIED TYPE: Primary | ICD-10-CM

## 2018-01-15 DIAGNOSIS — N81.11 MIDLINE CYSTOCELE: ICD-10-CM

## 2018-01-15 DIAGNOSIS — N34.2 ATROPHIC URETHRITIS: ICD-10-CM

## 2018-01-15 DIAGNOSIS — N95.2 ATROPHIC VAGINITIS: ICD-10-CM

## 2018-01-15 PROBLEM — E11.21 TYPE 2 DIABETES MELLITUS WITH NEPHROPATHY (HCC): Status: ACTIVE | Noted: 2018-01-15

## 2018-01-15 LAB
BILIRUB UR QL STRIP: NEGATIVE
GLUCOSE UR-MCNC: NEGATIVE MG/DL
KETONES P FAST UR STRIP-MCNC: NEGATIVE MG/DL
PH UR STRIP: 7 [PH] (ref 4.6–8)
PROT UR QL STRIP: NEGATIVE
SP GR UR STRIP: 1.01 (ref 1–1.03)
UA UROBILINOGEN AMB POC: NORMAL (ref 0.2–1)
URINALYSIS CLARITY POC: CLEAR
URINALYSIS COLOR POC: YELLOW
URINE BLOOD POC: NORMAL
URINE LEUKOCYTES POC: NEGATIVE
URINE NITRITES POC: NEGATIVE

## 2018-01-15 RX ORDER — ESTRADIOL 0.1 MG/G
CREAM VAGINAL
Qty: 42.5 G | Refills: 0 | Status: SHIPPED | OUTPATIENT
Start: 2018-01-15 | End: 2018-05-05 | Stop reason: ALTCHOICE

## 2018-01-15 NOTE — MR AVS SNAPSHOT
Visit Information Date & Time Provider Department Dept. Phone Encounter #  
 1/15/2018 10:30 AM Sampson Jung, [facility] [phone] [id number] Your Appointments 4/2/2018  1:30 PM  
Office Visit with Sampson Jung MD  
[facility] [address]) Appt Note: check up [address] [place] A [address] [address]  
[phone] [address] [facility] [address] Upcoming Health Maintenance Date Due  
 FOOT EXAM Q1 4/24/1936 ZOSTER VACCINE AGE 60> 2/24/1986 Pneumococcal 65+ Low/Medium Risk (1 of 2 - PCV13) 4/24/1991 MICROALBUMIN Q1 1/19/2017 MEDICARE YEARLY EXAM 7/22/2017 Influenza Age 5 to Adult 8/1/2017 HEMOGLOBIN A1C Q6M 4/16/2018 LIPID PANEL Q1 5/3/2018 EYE EXAM RETINAL OR DILATED Q1 5/19/2018 GLAUCOMA SCREENING Q2Y 5/19/2019 DTaP/Tdap/Td series (2 - Td) 3/9/2025 Allergies as of 1/15/2018  Review Complete On: 1/15/2018 By: Vick Edouard LPN Severity Noted Reaction Type Reactions Nitrofurantoin  12/02/2015   Systemic Nausea and Vomiting Current Immunizations  Never Reviewed Name Date Influenza High Dose Vaccine PF 9/22/2015 Tdap 3/9/2015  9:28 PM  
  
 Not reviewed this visit You Were Diagnosed With   
  
 Codes Comments Urinary incontinence, unspecified type    -  Primary ICD-10-CM: R32 
ICD-9-CM: 788.30 Vitals BP Pulse Height(growth percentile) SpO2 OB Status Smoking Status 148/76 (BP 1 Location: Right arm, BP Patient Position: Sitting) 80 5' 1\" (1.549 m) 98% Postmenopausal Never Smoker Vitals History Preferred Pharmacy Pharmacy Name Phone [facility] [address] [facility] 2/3 [phone] Your Updated Medication List  
  
   
This list is accurate as of: 1/15/18 11:56 AM.  Always use your most recent med list.  
  
  
  
  
 alendronate 70 mg tablet Commonly known as:  FOSAMAX TAKE 1 TABLET WEEKLY  
  
 amLODIPine 5 mg tablet Commonly known as:  Cecilia Kay Take 1 Tab by mouth daily. [employer]ensia CONTOUR strip Generic drug:  glucose blood VI test strips USE TO TEST BLOOD SUGARS THREE TIMES DAILY  
  
 aspirin 81 mg chewable tablet Take 1 Tab by mouth daily. azelastine 0.05 % ophthalmic solution Commonly known as:  OPTIVAR Instill 1 drop in both eyes twice daily for itchy eyes  
  
 bisacodyl 10 mg suppository Commonly known as:  DULCOLAX Insert 10 mg into rectum daily. clopidogrel 75 mg Tab Commonly known as:  PLAVIX Take 1 Tab by mouth daily. FEOSOL 325 mg (65 mg iron) tablet Generic drug:  ferrous sulfate Take  by mouth Daily (before breakfast). FISH OIL 1,000 mg Cap Generic drug:  omega-3 fatty acids-vitamin e Take 1 Cap by mouth two (2) times a day. FLEET ENEMA RE Insert  into rectum. glimepiride 2 mg tablet Commonly known as:  AMARYL  
1 tablet at breakfast.  1 tablet at supper  
  
 insulin lispro 100 unit/mL injection Commonly known as:  HUMALOG For Blood Sugar (mg/dL) of:    Less than 150 =   0 units          150 -199 =   2 units 200 -249 =   4 units 250 -299 =   6 units 300 -349 =   8 units 350 and above = 10 units and Call Physician LANTUS 100 unit/mL injection Generic drug:  insulin glargine 5 Units by SubCUTAneous route daily (with breakfast). LIDODERM 5 % Generic drug:  lidocaine  
by TransDERmal route every twenty-four (24) hours. Apply patch to the affected area for 12 hours a day and remove for 12 hours a day. lovastatin 20 mg tablet Commonly known as:  MEVACOR  
TAKE 1 TABLET DAILY  
  
 melatonin 3 mg tablet Take 3 mg by mouth nightly as needed. MIRALAX 17 gram packet Generic drug:  polyethylene glycol Take 17 g by mouth daily. NovoLOG 100 unit/mL injection Generic drug:  insulin aspart by SubCUTAneous route. Indications: sliding scale ONE-A-DAY WOMEN'S 50 PLUS PO Take  by mouth. * oxybutynin chloride XL 10 mg CR tablet Commonly known as:  DITROPAN XL  
TAKE 1 TABLET BY MOUTH AT BEDTIME FOR BLADDER CONTGROL * oxybutynin 5 mg tablet Commonly known as:  TVRULZGZ Take 1 Tab by mouth nightly. ROXICODONE 5 mg immediate release tablet Generic drug:  oxyCODONE IR Take 5 mg by mouth every six (6) hours as needed for Pain (Take 1/2 tablet every 6 hours as needed for pain). STARLIX 60 mg tablet Generic drug:  nateglinide Take 60 mg by mouth Before breakfast, lunch, and dinner. TRUEPLUS LANCETS 30 gauge Misc Generic drug:  lancets Use to check blood glucose up to twice daily DX:e11.9 TYLENOL 325 mg tablet Generic drug:  acetaminophen Take 650 mg by mouth every six (6) hours as needed for Pain. VISINE-A 0.025-0.3 % ophthalmic solution Generic drug:  naphazoline-pheniramine Administer  to both eyes two (2) times daily as needed. VITAMIN B-12 1,000 mcg tablet Generic drug:  cyanocobalamin Take 1,000 mcg by mouth daily. VITAMIN C 250 mg tablet Generic drug:  ascorbic acid (vitamin C) Take  by mouth. ZyrTEC 10 mg tablet Generic drug:  cetirizine Take  by mouth daily. * Notice: This list has 2 medication(s) that are the same as other medications prescribed for you. Read the directions carefully, and ask your doctor or other care provider to review them with you. We Performed the Following AMB POC URINALYSIS DIP STICK AUTO W/O MICRO [39417 CPT(R)] Patient Instructions Preventing Falls: Care Instructions Your Care Instructions Getting around your home safely can be a challenge if you have injuries or health problems that make it easy for you to fall.  Loose rugs and furniture in walkways are among the dangers for many older people who have problems walking or who have poor eyesight. People who have conditions such as arthritis, osteoporosis, or dementia also have to be careful not to fall. You can make your home safer with a few simple measures. Follow-up care is a key part of your treatment and safety. Be sure to make and go to all appointments, and call your doctor if you are having problems. It's also a good idea to know your test results and keep a list of the medicines you take. How can you care for yourself at home? Taking care of yourself · You may get dizzy if you do not drink enough water. To prevent dehydration, drink plenty of fluids, enough so that your urine is light yellow or clear like water. Choose water and other caffeine-free clear liquids. If you have kidney, heart, or liver disease and have to limit fluids, talk with your doctor before you increase the amount of fluids you drink. · Exercise regularly to improve your strength, muscle tone, and balance. Walk if you can. Swimming may be a good choice if you cannot walk easily. · Have your vision and hearing checked each year or any time you notice a change. If you have trouble seeing and hearing, you might not be able to avoid objects and could lose your balance. · Know the side effects of the medicines you take. Ask your doctor or pharmacist whether the medicines you take can affect your balance. Sleeping pills or sedatives can affect your balance. · Limit the amount of alcohol you drink. Alcohol can impair your balance and other senses. · Ask your doctor whether calluses or corns on your feet need to be removed. If you wear loose-fitting shoes because of calluses or corns, you can lose your balance and fall. · Talk to your doctor if you have numbness in your feet. Preventing falls at home · Remove raised doorway thresholds, throw rugs, and clutter. Repair loose carpet or raised areas in the floor. · Move furniture and electrical cords to keep them out of walking paths. · Use nonskid floor wax, and wipe up spills right away, especially on ceramic tile floors. · If you use a walker or cane, put rubber tips on it. If you use crutches, clean the bottoms of them regularly with an abrasive pad, such as steel wool. · Keep your house well lit, especially [place], and outside walkways. Use night-lights in areas such as hallways and bathrooms. Add extra light switches or use remote switches (such as switches that go on or off when you clap your hands) to make it easier to turn lights on if you have to get up during the night. · Install sturdy handrails on stairways. · Move items in your cabinets so that the things you use a lot are on the lower shelves (about waist level). · Keep a cordless phone and a flashlight with new batteries by your bed. If possible, put a phone in each of the main rooms of your house, or carry a cell phone in case you fall and cannot reach a phone. Or, you can wear a device around your neck or wrist. You push a button that sends a signal for help. · Wear low-heeled shoes that fit well and give your feet good support. Use footwear with nonskid soles. Check the heels and soles of your shoes for wear. Repair or replace worn heels or soles. · Do not wear socks without shoes on wood floors. · Walk on the grass when the sidewalks are slippery. If you live in an area that gets snow and ice in the winter, sprinkle salt on slippery steps and sidewalks. Preventing falls in the bath · Install grab bars and nonskid mats inside and outside your shower or tub and near the toilet and sinks. · Use shower chairs and bath benches. · Use a hand-held shower head that will allow you to sit while showering.  
· Get into a tub or shower by putting the weaker leg in first. Get out of a tub or shower with your strong side first. 
 · Repair loose toilet seats and consider installing a raised toilet seat to make getting on and off the toilet easier. · Keep your bathroom door unlocked while you are in the shower. Where can you learn more? Go to http://penelope-jennifer.info/. Enter [phone] in the search box to learn more about \"Preventing Falls: Care Instructions. \" Current as of: May 12, 2017 Content Version: 11.4 © 2458-6924 [employer]. Care instructions adapted under license by [employer] (which disclaims liability or warranty for this information). If you have questions about a medical condition or this instruction, always ask your healthcare professional. [place] [address] any warranty or liability for your use of this information. Introducing hospitals & HEALTH SERVICES! [employer] introduces [employer] patient portal. Now you can access parts of your medical record, email your doctor's office, and request medication refills online. 1. In your internet browser, go to [email or web address]" 2. Click on the First Time User? Click Here link in the Sign In box. You will see the New Member Sign Up page. 3. Enter your [employer] Access Code exactly as it appears below. You will not need to use this code after youve completed the sign-up process. If you do not sign up before the expiration date, you must request a new code. · [employer] Access Code: [email or web address] Expires: 4/15/2018 10:47 AM 
 
4. Enter the last four digits of your Social Security Number (xxxx) and Date of Birth (mm/dd/yyyy) as indicated and click Submit. You will be taken to the next sign-up page. 5. Create a Mac[employer]ill ID. This will be your [employer] login ID and cannot be changed, so think of one that is secure and easy to remember. 6. Create a [employer] password. You can change your password at any time. 7. Enter your Password Reset Question and Answer.  This can be used at a later time if you forget your password. 8. Enter your e-mail address. You will receive e-mail notification when new information is available in [address] 9. Click Sign Up. You can now view and download portions of your medical record. 10. Click the Download Summary menu link to download a portable copy of your medical information. If you have questions, please visit the Frequently Asked Questions section of the [employer] website. Remember, [employer] is NOT to be used for urgent needs. For medical emergencies, dial 911. Now available from your iPhone and Android! Please provide this summary of care documentation to your next provider. Your primary care clinician is listed as Edward Falcon. If you have any questions after today's visit, please call [phone].

## 2018-01-15 NOTE — PROGRESS NOTES
Chief Complaint   Patient presents with    New Patient    Urinary Incontinence    Nocturia       HISTORY OF PRESENT ILLNESS:  Evan Crain is a 80 y.o. female comes in today with a long history of mixed urinary incontinence both urgency and mild stress. She has to wear 2 depends daily and still is unable to get to the bathroom in time to urinate. No bleeding, no history of urinary infections. She is on no estrogen and has had 2 children vaginally with no significant problems during that obstetrical event. She has never used any estrogen cream.  She has been placed on oxybutynin by her primary care physician but this has not seemed to have slowed the problem down much. She has not had any bleeding on tissue or her underclothing. She does have some trouble standing now. She also has mild diabetes but that seems to be under good control and she has had that she says for about 40 years.     Past Medical History:   Diagnosis Date    Abnormality of gait     Acute upper respiratory infections of unspecified site     Anemia     Broken shoulder     right    Chest pain     Chronic airway obstruction, not elsewhere classified     COPD     Diabetes ([address]     Essential hypertension, benign     Hearing loss     History of tachycardia     possible SVT    Hypercholesterolemia     Hyperlipidemia     Hypertension     Incontinence     Mitral valve prolapse     MVP (mitral valve prolapse)     OAB (overactive bladder)     Osteoarthritis     spine    Osteoporosis     Pure hypercholesterolemia     Senile osteoporosis     Thyroid disease     Thyrotoxicosis     Type II or unspecified type diabetes mellitus without mention of complication, not stated as uncontrolled     Urinary tract infection, site not specified        Past Surgical History:   Procedure Laterality Date    HX KNEE REPLACEMENT  left    HX OPEN REDUCTION INTERNAL FIXATION Left     patellar fracture    HX OTHER SURGICAL Right shoulder surgery    REMV CATARACT EXTRACAP,INSERT LENS,COMP  [id number]    bilateral       Social History   Substance Use Topics    Smoking status: Never Smoker    Smokeless tobacco: Never Used    Alcohol use No       Allergies   Allergen Reactions    Nitrofurantoin Nausea and Vomiting       Family History   Problem Relation Age of Onset    Diabetes Mother     Alcohol abuse Father     Arthritis-rheumatoid Sister     Diabetes Brother     Other Brother      eye problems    Hypertension Son     Diabetes Son     Cancer Son      Pancreatic cancer    Alcohol abuse Son        Current Outpatient Prescriptions   Medication Sig Dispense Refill    MV-MN/FOLIC ACID/CALCIUM/VIT K (ONE-A-DAY WOMEN'S 50 PLUS PO) Take  by mouth.  oxybutynin (DITROPAN) 5 mg tablet Take 1 Tab by mouth nightly. 30 Tab 5    bisacodyl (DULCOLAX) 10 mg suppository Insert 10 mg into rectum daily. 30 Suppository 1    naphazoline-pheniramine (VISINE-A) 0.025-0.3 % ophthalmic solution Administer  to both eyes two (2) times daily as needed.  insulin aspart (NOVOLOG) 100 unit/mL injection by SubCUTAneous route. Indications: sliding scale      amLODIPine (NORVASC) 5 mg tablet Take 1 Tab by mouth daily. 30 Tab 0    aspirin 81 mg chewable tablet Take 1 Tab by mouth daily. 30 Tab 1    insulin lispro (HUMALOG) 100 unit/mL injection For Blood Sugar (mg/dL) of:     Less than 150 =   0 units           150 -199 =   2 units  200 -249 =   4 units  250 -299 =   6 units  300 -349 =   8 units  350 and above = 10 units and Call Physician 1 Vial 0    insulin glargine (LANTUS) 100 unit/mL injection 5 Units by SubCUTAneous route daily (with breakfast).  ferrous sulfate (FEOSOL) 325 mg (65 mg iron) tablet Take  by mouth Daily (before breakfast).  polyethylene glycol (MIRALAX) 17 gram packet Take 17 g by mouth daily.       glimepiride (AMARYL) 2 mg tablet 1 tablet at breakfast.  1 tablet at supper 180 Tab 3    alendronate (FOSAMAX) 70 mg tablet TAKE 1 TABLET WEEKLY 12 Tab 3    lovastatin (MEVACOR) 20 mg tablet TAKE 1 TABLET DAILY 90 Tab 3    omega-3 fatty acids-vitamin e (FISH OIL) 1,000 mg Cap Take 1 Cap by mouth two (2) times a day.  azelastine (OPTIVAR) 0.05 % ophthalmic solution Instill 1 drop in both eyes twice daily for itchy eyes 6 mL 0    oxyCODONE IR (ROXICODONE) 5 mg immediate release tablet Take 5 mg by mouth every six (6) hours as needed for Pain (Take 1/2 tablet every 6 hours as needed for pain).  lidocaine (LIDODERM) 5 % by TransDERmal route every twenty-four (24) hours. Apply patch to the affected area for 12 hours a day and remove for 12 hours a day.  cetirizine (ZYRTEC) 10 mg tablet Take  by mouth daily.  acetaminophen (TYLENOL) 325 mg tablet Take 650 mg by mouth every six (6) hours as needed for Pain.  SOD PHOS,M-B/NA PHOS,DI-BA (FLEET ENEMA RE) Insert  into rectum.  clopidogrel (PLAVIX) 75 mg tab Take 1 Tab by mouth daily. 30 Tab 1    oxybutynin chloride XL (DITROPAN XL) 10 mg CR tablet TAKE 1 TABLET BY MOUTH AT BEDTIME FOR BLADDER CONTGROL 30 Tab 0    nateglinide (STARLIX) 60 mg tablet Take 60 mg by mouth Before breakfast, lunch, and dinner.  ascorbic acid, vitamin C, (VITAMIN C) 250 mg tablet Take  by mouth.  TRUEPLUS LANCETS 30 gauge misc Use to check blood glucose up to twice daily DX:e11.9 300 Lancet 3    ASCENSIA CONTOUR strip USE TO TEST BLOOD SUGARS THREE TIMES DAILY (Patient taking differently: USE TO TEST BLOOD SUGARS THREE TIMES DAILY--embrace strips) 100 Strip 11    melatonin 3 mg tablet Take 3 mg by mouth nightly as needed.  cyanocobalamin (VITAMIN B-12) 1,000 mcg tablet Take 1,000 mcg by mouth daily. REVIEW OF SYSTEMS:   Documented on the chart refer to that for details.       PHYSICAL EXAMINATION:     Visit Vitals    /76 (BP 1 Location: Right arm, BP Patient Position: Sitting)    Pulse 80    Ht 5' 1\" (1.549 m)    SpO2 98%     Constitutional: Well developed, well-nourished female in no acute distress. CV:  No peripheral swelling noted  Respiratory: No respiratory distress or difficulties  Abdomen:  Soft and nontender. No masses. No hepatosplenomegaly.  Female: BSU and external genitalia are all remarkable for very advanced atrophic vaginitis and urethritis. She has got noticeable vaginal stenosis. The urethra and bladder however seem to be well maintained support wise. She does not have a significant cystocele although there is a bit of mild midline probably grade 1 or 2 cystocele. No vaginal or urethral bleeding is noted. And no urethral caruncle  Skin:  Normal color. No evidence of jaundice. Neuro/Psych:  Patient with appropriate affect. Alert and oriented. Lymphatic:   No enlargement of supraclavicular lymph nodes. Results for orders placed or performed in visit on 01/15/18   AMB POC URINALYSIS DIP STICK AUTO W/O MICRO   Result Value Ref Range    Color (UA POC) Yellow     Clarity (UA POC) Clear     Glucose (UA POC) Negative Negative    Bilirubin (UA POC) Negative Negative    Ketones (UA POC) Negative Negative    Specific gravity (UA POC) 1.015 1.001 - 1.035    Blood (UA POC) Trace Negative    pH (UA POC) 7.0 4.6 - 8.0    Protein (UA POC) Negative Negative    Urobilinogen (UA POC) 0.2 mg/dL 0.2 - 1    Nitrites (UA POC) Negative Negative    Leukocyte esterase (UA POC) Negative Negative         REVIEW OF LABS AND IMAGING:      Imaging Report Reviewed? NO      Images Reviewed? NO           Other Lab Data Reviewed? YES    ASSESSMENT:     ICD-10-CM ICD-9-CM    1. Urinary incontinence, unspecified type R32 788.30 AMB POC URINALYSIS DIP STICK AUTO W/O MICRO   2. Atrophic vaginitis N95.2 627.3    3. 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  Midline cystocele N81.11 618.01                 PLAN/DISCUSSION: I think she has advanced atrophic vaginitis and urethritis and in combination with her lack of mobility as far as being able to physically ambulate to the bathroom in time to urinate makes her have significant more urge incontinence. I think the oxybutynin is certainly a good first drug but I would like to add Estrace vaginal cream to this and have written a prescription for 1/2 cc applied topically to the urethral meatus twice weekly and I will see her again in about 3 months. Patient voices understanding and agreement to the plan. Marvin Smallwood MD on 1/15/2018           Please note: This document has been produced using voice recognition software. Unrecognized errors in transcription may be present.

## 2018-01-15 NOTE — PROGRESS NOTES
Ms. Vj Powell has a reminder for a \"due or due soon\" health maintenance. I have asked that she contact her primary care provider for follow-up on this health maintenance. RBV Per Dr. Aguillon Fix vaginal cream apply 0.5 grams to urinary meatus twice weekly 45 grams no refills sent to pharmacy.

## 2018-01-15 NOTE — PATIENT INSTRUCTIONS

## 2018-01-30 ENCOUNTER — TELEPHONE (OUTPATIENT)
Dept: INTERNAL MEDICINE CLINIC | Age: 83
End: 2018-01-30

## 2018-01-30 NOTE — TELEPHONE ENCOUNTER
Patient would like Dr Katarina Lott to cut out her insulin in the morning. The medication is too expensive for her right now. She needs to get rid of her most expensive medication. Dr Katarina Lott will need to send an order over to [facility]. She also would like to have a visit from him at [facility]. Please call her at [phone].

## 2018-01-31 NOTE — TELEPHONE ENCOUNTER
Per Dr. Alphonso Brooks, I talked to Paxton Cross at [address] to let her know he is not going to discontinue the Lantus. It is not safe to do this. Patient was notified as well and will be discussing this with the facility.

## 2018-02-20 ENCOUNTER — TELEPHONE (OUTPATIENT)
Dept: INTERNAL MEDICINE CLINIC | Age: 83
End: 2018-02-20

## 2018-02-22 ENCOUNTER — OFFICE VISIT (OUTPATIENT)
Dept: INTERNAL MEDICINE CLINIC | Age: 83
End: 2018-02-22

## 2018-02-22 ENCOUNTER — HOSPITAL ENCOUNTER (OUTPATIENT)
Dept: LAB | Age: 83
Discharge: HOME | End: 2018-02-22
Payer: MEDICARE

## 2018-02-22 DIAGNOSIS — Z79.4 CONTROLLED TYPE 2 DIABETES MELLITUS WITHOUT COMPLICATION, WITH LONG-TERM CURRENT USE OF INSULIN (HCC): Primary | ICD-10-CM

## 2018-02-22 DIAGNOSIS — E05.00 THYROTOXICOSIS WITH DIFFUSE GOITER AND WITHOUT THYROID STORM: ICD-10-CM

## 2018-02-22 DIAGNOSIS — I10 ESSENTIAL HYPERTENSION: ICD-10-CM

## 2018-02-22 DIAGNOSIS — Z79.4 CONTROLLED TYPE 2 DIABETES MELLITUS WITHOUT COMPLICATION, WITH LONG-TERM CURRENT USE OF INSULIN (HCC): ICD-10-CM

## 2018-02-22 DIAGNOSIS — E11.9 CONTROLLED TYPE 2 DIABETES MELLITUS WITHOUT COMPLICATION, WITH LONG-TERM CURRENT USE OF INSULIN (HCC): Primary | ICD-10-CM

## 2018-02-22 DIAGNOSIS — N32.81 OVERACTIVE BLADDER: ICD-10-CM

## 2018-02-22 DIAGNOSIS — Z86.79 HX OF SUBDURAL HEMATOMA: ICD-10-CM

## 2018-02-22 DIAGNOSIS — E11.9 CONTROLLED TYPE 2 DIABETES MELLITUS WITHOUT COMPLICATION, WITH LONG-TERM CURRENT USE OF INSULIN (HCC): ICD-10-CM

## 2018-02-22 LAB
ALBUMIN SERPL-MCNC: 3.6 G/DL (ref 3.4–5)
ALBUMIN/GLOB SERPL: 1 {RATIO} (ref 0.8–1.7)
ALP SERPL-CCNC: 40 U/L (ref 45–117)
ALT SERPL-CCNC: 20 U/L (ref 13–56)
ANION GAP SERPL CALC-SCNC: 8 MMOL/L (ref 3–18)
AST SERPL-CCNC: 20 U/L (ref 15–37)
BASOPHILS # BLD: 0 K/UL (ref 0–0.06)
BASOPHILS NFR BLD: 0 % (ref 0–2)
BILIRUB SERPL-MCNC: 0.2 MG/DL (ref 0.2–1)
BUN SERPL-MCNC: 34 MG/DL (ref 7–18)
BUN/CREAT SERPL: 32 (ref 12–20)
CALCIUM SERPL-MCNC: 9.5 MG/DL (ref 8.5–10.1)
CHLORIDE SERPL-SCNC: 103 MMOL/L (ref 100–108)
CO2 SERPL-SCNC: 27 MMOL/L (ref 21–32)
CREAT SERPL-MCNC: 1.05 MG/DL (ref 0.6–1.3)
DIFFERENTIAL METHOD BLD: ABNORMAL
EOSINOPHIL # BLD: 0.1 K/UL (ref 0–0.4)
EOSINOPHIL NFR BLD: 2 % (ref 0–5)
ERYTHROCYTE [DISTWIDTH] IN BLOOD BY AUTOMATED COUNT: 13.6 % (ref 11.6–14.5)
GLOBULIN SER CALC-MCNC: 3.5 G/DL (ref 2–4)
GLUCOSE SERPL-MCNC: 60 MG/DL (ref 74–99)
HCT VFR BLD AUTO: 31.7 % (ref 35–45)
HGB BLD-MCNC: 11.6 G/DL (ref 12–16)
LYMPHOCYTES # BLD: 0.8 K/UL (ref 0.9–3.6)
LYMPHOCYTES NFR BLD: 15 % (ref 21–52)
MCH RBC QN AUTO: 34.8 PG (ref 24–34)
MCHC RBC AUTO-ENTMCNC: 36.6 G/DL (ref 31–37)
MCV RBC AUTO: 95.2 FL (ref 74–97)
MONOCYTES # BLD: 0.8 K/UL (ref 0.05–1.2)
MONOCYTES NFR BLD: 17 % (ref 3–10)
NEUTS SEG # BLD: 3.4 K/UL (ref 1.8–8)
NEUTS SEG NFR BLD: 66 % (ref 40–73)
PLATELET # BLD AUTO: 231 K/UL (ref 135–420)
PMV BLD AUTO: 10.5 FL (ref 9.2–11.8)
POTASSIUM SERPL-SCNC: 4.2 MMOL/L (ref 3.5–5.5)
PROT SERPL-MCNC: 7.1 G/DL (ref 6.4–8.2)
RBC # BLD AUTO: 3.33 M/UL (ref 4.2–5.3)
SODIUM SERPL-SCNC: 138 MMOL/L (ref 136–145)
TSH SERPL DL<=0.05 MIU/L-ACNC: 0.53 UIU/ML (ref 0.36–3.74)
WBC # BLD AUTO: 5.1 K/UL (ref 4.6–13.2)

## 2018-02-22 PROCEDURE — 80053 COMPREHEN METABOLIC PANEL: CPT | Performed by: INTERNAL MEDICINE

## 2018-02-22 PROCEDURE — 85025 COMPLETE CBC W/AUTO DIFF WBC: CPT | Performed by: INTERNAL MEDICINE

## 2018-02-22 PROCEDURE — 84443 ASSAY THYROID STIM HORMONE: CPT | Performed by: INTERNAL MEDICINE

## 2018-02-22 RX ORDER — CIPROFLOXACIN HYDROCHLORIDE 3.5 MG/ML
SOLUTION/ DROPS TOPICAL
Qty: 2.5 ML | Refills: 2 | Status: SHIPPED | OUTPATIENT
Start: 2018-02-22 | End: 2018-05-05 | Stop reason: ALTCHOICE

## 2018-02-22 RX ORDER — SYRINGE,NEEDLE,INSULN,SF 0.5ML 30 GX5/16"
SYRINGE, EMPTY DISPOSABLE MISCELLANEOUS
COMMUNITY
Start: 2018-01-29 | End: 2021-01-01

## 2018-02-22 RX ORDER — ASPIRIN 81 MG/1
81 TABLET ORAL DAILY
COMMUNITY
End: 2018-06-19 | Stop reason: SDUPTHER

## 2018-02-22 RX ORDER — BISACODYL 5 MG
5 TABLET, DELAYED RELEASE (ENTERIC COATED) ORAL
COMMUNITY
End: 2019-03-23 | Stop reason: ALTCHOICE

## 2018-02-22 RX ORDER — CEPHALEXIN 500 MG/1
CAPSULE ORAL
Qty: 14 CAP | Refills: 0 | Status: SHIPPED | OUTPATIENT
Start: 2018-02-22 | End: 2018-05-05 | Stop reason: ALTCHOICE

## 2018-02-22 NOTE — PATIENT INSTRUCTIONS
Health Maintenance Due   Topic    FOOT EXAM Q1     ZOSTER VACCINE AGE 60>     Pneumococcal 65+ Low/Medium Risk (1 of 2 - PCV13)    MICROALBUMIN Q1     MEDICARE YEARLY EXAM     Influenza Age 5 to Adult

## 2018-02-22 NOTE — PROGRESS NOTES
Medication reconciliation completed at MD request [facility] obtained for reconciliation. Patient is unaware of med names/doses as Ayleen Wiggins I does the medication adminsitration. Daniela Li was notified of these changes.     Allergies   Allergen Reactions    Nitrofurantoin Nausea and Vomiting       Medication List Prior to Visit:   Medications Prior to Visit   Medication    MV-MN/FOLIC ACID/CALCIUM/VIT K (ONE-A-DAY WOMEN'S 50 PLUS PO)    estradiol (ESTRACE) 0.01 % (0.1 mg/gram) vaginal cream    oxybutynin (DITROPAN) 5 mg tablet    bisacodyl (DULCOLAX) 10 mg suppository    naphazoline-pheniramine (VISINE-A) 0.025-0.3 % ophthalmic solution    acetaminophen (TYLENOL) 325 mg tablet    insulin aspart (NOVOLOG) 100 unit/mL injection    SOD PHOS,M-B/NA PHOS,DI-BA (FLEET ENEMA RE)    amLODIPine (NORVASC) 5 mg tablet    insulin glargine (LANTUS) 100 unit/mL injection    ferrous sulfate (FEOSOL) 325 mg (65 mg iron) tablet    polyethylene glycol (MIRALAX) 17 gram packet    TRUEPLUS LANCETS 30 gauge misc    glimepiride (AMARYL) 2 mg tablet    alendronate (FOSAMAX) 70 mg tablet    lovastatin (MEVACOR) 20 mg tablet    ASCENSIA CONTOUR strip    melatonin 3 mg tablet    omega-3 fatty acids-vitamin e (FISH OIL) 1,000 mg Cap    azelastine (OPTIVAR) 0.05 % ophthalmic solution    oxyCODONE IR (ROXICODONE) 5 mg immediate release tablet    lidocaine (LIDODERM) 5 %    cetirizine (ZYRTEC) 10 mg tablet    aspirin 81 mg chewable tablet    clopidogrel (PLAVIX) 75 mg tab    insulin lispro (HUMALOG) 100 unit/mL injection    oxybutynin chloride XL (DITROPAN XL) 10 mg CR tablet    nateglinide (STARLIX) 60 mg tablet    ascorbic acid, vitamin C, (VITAMIN C) 250 mg tablet    cyanocobalamin (VITAMIN B-12) 1,000 mcg tablet       Medications Discontinued / Updated During Visit:   Medications Discontinued During This Encounter   Medication Reason    aspirin 81 mg chewable tablet Formulary Change    insulin lispro (HUMALOG) 100 unit/mL injection Formulary Change    oxybutynin chloride XL (DITROPAN XL) 10 mg CR tablet Not A Current Medication    clopidogrel (PLAVIX) 75 mg tab Not A Current Medication    ascorbic acid, vitamin C, (VITAMIN C) 250 mg tablet Not A Current Medication    cetirizine (ZYRTEC) 10 mg tablet Not A Current Medication    cyanocobalamin (VITAMIN B-12) 1,000 mcg tablet Not A Current Medication    lidocaine (LIDODERM) 5 % Not A Current Medication    nateglinide (STARLIX) 60 mg tablet Not A Current Medication    oxyCODONE IR (ROXICODONE) 5 mg immediate release tablet Not A Current Medication    azelastine (OPTIVAR) 0.05 % ophthalmic solution Not A Current Medication       Updated Medication List   Current Outpatient Prescriptions   Medication Sig    aspirin delayed-release 81 mg tablet Take 81 mg by mouth daily.  bisacodyl (DULCOLAX, BISACODYL,) 5 mg EC tablet Take 5 mg by mouth two (2) times daily as needed for Constipation.  EASY TOUCH INSULIN SAFETY SYR 0.5 mL 30 gauge x 5/16\" syrg Use subcutaneously as directed with insulin.  MV-MN/FOLIC ACID/CALCIUM/VIT K (ONE-A-DAY WOMEN'S 50 PLUS PO) Take 1 Tab by mouth daily.  estradiol (ESTRACE) 0.01 % (0.1 mg/gram) vaginal cream Apply 0.5 grams to urinary meatus twice a week.  oxybutynin (DITROPAN) 5 mg tablet Take 1 Tab by mouth nightly.  bisacodyl (DULCOLAX) 10 mg suppository Insert 10 mg into rectum daily. (Patient taking differently: Insert 10 mg into rectum daily as needed (Constipation). )    naphazoline-pheniramine (VISINE-A) 0.025-0.3 % ophthalmic solution Administer 2 Drops to both eyes three (3) times daily as needed (Eye irritation).  acetaminophen (TYLENOL) 325 mg tablet Take 650 mg by mouth every four (4) hours as needed (Pain/Temp (no more than 3 grams APAP/24 hours)).  insulin aspart (NOVOLOG) 100 unit/mL injection by SubCUTAneous route.  Inject subcutaneously per sliding scale before meals and at bedtime - -250mg/dL = 2 units, 251-300 mg/dL = 4 units, 301-350 mg/dL 6 units, 351-400 mg/dL = 8 units, 401-450 mg/dL = 10 units (call MD if < 70 or > 400 mg/dL)  Indications: sliding scale    SOD PHOS,M-B/NA PHOS,DI-BA (FLEET ENEMA RE) Insert 1 Dose into rectum daily as needed (Constipation (if no BM, notify MD)).  amLODIPine (NORVASC) 5 mg tablet Take 1 Tab by mouth daily.  insulin glargine (LANTUS) 100 unit/mL injection 5 Units by SubCUTAneous route daily (with breakfast).  ferrous sulfate (FEOSOL) 325 mg (65 mg iron) tablet Take 325 mg by mouth Daily (before breakfast).  polyethylene glycol (MIRALAX) 17 gram packet Take 17 g by mouth two (2) times daily as needed (Constipation).  TRUEPLUS LANCETS 30 gauge misc Use to check blood glucose up to twice daily DX:e11.9    glimepiride (AMARYL) 2 mg tablet 1 tablet at breakfast.  1 tablet at supper    alendronate (FOSAMAX) 70 mg tablet TAKE 1 TABLET WEEKLY    lovastatin (MEVACOR) 20 mg tablet TAKE 1 TABLET DAILY    ASCENSIA CONTOUR strip USE TO TEST BLOOD SUGARS THREE TIMES DAILY (Patient taking differently: USE TO TEST BLOOD SUGARS THREE TIMES DAILY--embrace strips)    melatonin 3 mg tablet Take 3 mg by mouth nightly as needed.  omega-3 fatty acids-vitamin e (FISH OIL) 1,000 mg Cap Take 1 Cap by mouth daily. No current facility-administered medications for this visit.           Chris Velasquez, PharmD, BCACP

## 2018-02-22 NOTE — PROGRESS NOTES
1. Have you been to the ER, urgent care clinic since your last visit? Hospitalized since your last visit? No    2. Have you seen or consulted any other health care providers outside of the [address] [place] [address] since your last visit? Include any pap smears or colon screening.  No

## 2018-02-23 VITALS
OXYGEN SATURATION: 97 % | SYSTOLIC BLOOD PRESSURE: 126 MMHG | HEIGHT: 61 IN | TEMPERATURE: 99 F | WEIGHT: 103 LBS | RESPIRATION RATE: 16 BRPM | BODY MASS INDEX: 19.45 KG/M2 | DIASTOLIC BLOOD PRESSURE: 78 MMHG | HEART RATE: 76 BPM

## 2018-02-23 PROBLEM — N32.81 OVERACTIVE BLADDER: Status: ACTIVE | Noted: 2018-02-23

## 2018-02-23 NOTE — PROGRESS NOTES
The patient presents to the office today with the chief complaint of type II diabetes mellitus    HPI    The patient remains on insulin for type II diabetes mellitus. Her sugars are doing well. The patient persists with urinary frequency. The patient has been on Ditropan with a mediocre response. A concern has been raised of possible mental status change on the medication. The patient has been on treatment for hyperthyroidism. She is now off therapy. The patient is status post surgery for a subdural hematoma. She is doing ok neurologically. Review of Systems   Constitutional: Negative for weight loss. Respiratory: Negative for shortness of breath. Cardiovascular: Negative for chest pain and leg swelling. Genitourinary: Positive for frequency. Allergies   Allergen Reactions    Nitrofurantoin Nausea and Vomiting       Current Outpatient Prescriptions   Medication Sig Dispense Refill    aspirin delayed-release 81 mg tablet Take 81 mg by mouth daily.  bisacodyl (DULCOLAX, BISACODYL,) 5 mg EC tablet Take 5 mg by mouth two (2) times daily as needed for Constipation.  EASY TOUCH INSULIN SAFETY SYR 0.5 mL 30 gauge x 5/16\" syrg Use subcutaneously as directed with insulin.  cephALEXin (KEFLEX) 500 mg capsule 1 cap twice per day for one week - Dx:  Eye Infectin 14 Cap 0    mirabegron ER (MYRBETRIQ) 25 mg ER tablet 1 tablet daily   Dx:  Overactive bladder 30 Tab 2    ciprofloxacin HCl (CILOXAN) 0.3 % ophthalmic solution 2 drops in left eye three times per day  Dx:  Eye Infection 2.5 mL 2    MV-MN/FOLIC ACID/CALCIUM/VIT K (ONE-A-DAY WOMEN'S 50 PLUS PO) Take 1 Tab by mouth daily.  estradiol (ESTRACE) 0.01 % (0.1 mg/gram) vaginal cream Apply 0.5 grams to urinary meatus twice a week. 42.5 g 0    oxybutynin (DITROPAN) 5 mg tablet Take 1 Tab by mouth nightly. 30 Tab 5    bisacodyl (DULCOLAX) 10 mg suppository Insert 10 mg into rectum daily.  (Patient taking differently: Insert 10 mg into rectum daily as needed (Constipation). ) 30 Suppository 1    naphazoline-pheniramine (VISINE-A) 0.025-0.3 % ophthalmic solution Administer 2 Drops to both eyes three (3) times daily as needed (Eye irritation).  acetaminophen (TYLENOL) 325 mg tablet Take 650 mg by mouth every four (4) hours as needed (Pain/Temp (no more than 3 grams APAP/24 hours)).  insulin aspart (NOVOLOG) 100 unit/mL injection by SubCUTAneous route. Inject subcutaneously per sliding scale before meals and at bedtime - -250mg/dL = 2 units, 251-300 mg/dL = 4 units, 301-350 mg/dL 6 units, 351-400 mg/dL = 8 units, 401-450 mg/dL = 10 units (call MD if < 70 or > 400 mg/dL)  Indications: sliding scale      SOD PHOS,M-B/NA PHOS,DI-BA (FLEET ENEMA RE) Insert 1 Dose into rectum daily as needed (Constipation (if no BM, notify MD)).  amLODIPine (NORVASC) 5 mg tablet Take 1 Tab by mouth daily. 30 Tab 0    insulin glargine (LANTUS) 100 unit/mL injection 5 Units by SubCUTAneous route daily (with breakfast).  ferrous sulfate (FEOSOL) 325 mg (65 mg iron) tablet Take 325 mg by mouth Daily (before breakfast).  polyethylene glycol (MIRALAX) 17 gram packet Take 17 g by mouth two (2) times daily as needed (Constipation).  TRUEPLUS LANCETS 30 gauge misc Use to check blood glucose up to twice daily DX:e11.9 300 Lancet 3    glimepiride (AMARYL) 2 mg tablet 1 tablet at breakfast.  1 tablet at supper 180 Tab 3    alendronate (FOSAMAX) 70 mg tablet TAKE 1 TABLET WEEKLY 12 Tab 3    lovastatin (MEVACOR) 20 mg tablet TAKE 1 TABLET DAILY 90 Tab 3    ASCENSIA CONTOUR strip USE TO TEST BLOOD SUGARS THREE TIMES DAILY (Patient taking differently: USE TO TEST BLOOD SUGARS THREE TIMES DAILY--embrace strips) 100 Strip 11    melatonin 3 mg tablet Take 3 mg by mouth nightly as needed.  omega-3 fatty acids-vitamin e (FISH OIL) 1,000 mg Cap Take 1 Cap by mouth daily.          Past Medical History:   Diagnosis Date    Abnormality of gait     Acute upper respiratory infections of unspecified site     Anemia     Broken shoulder     right    Chest pain     Chronic airway obstruction, not elsewhere classified     COPD     Diabetes ([facility] [address]     Essential hypertension, benign     Hearing loss     History of tachycardia     possible SVT    Hypercholesterolemia     Hyperlipidemia     Hypertension     Incontinence     Mitral valve prolapse     MVP (mitral valve prolapse)     OAB (overactive bladder)     Osteoarthritis     spine    Osteoporosis     Pure hypercholesterolemia     Senile osteoporosis     Thyroid disease     Thyrotoxicosis     Type II or unspecified type diabetes mellitus without mention of complication, not stated as uncontrolled     Urinary tract infection, site not specified        Past Surgical History:   Procedure Laterality Date    HX KNEE REPLACEMENT  left    HX OPEN REDUCTION INTERNAL FIXATION Left     patellar fracture    HX OTHER SURGICAL Right     shoulder surgery    REMV CATARACT EXTRACAP,INSERT LENS,COMP  [id number]    bilateral       Social History     Social History    Marital status:      Spouse name: N/A    Number of children: N/A    Years of education: N/A     Occupational History    Not on file. Social History Main Topics    Smoking status: Never Smoker    Smokeless tobacco: Never Used    Alcohol use No    Drug use: No    Sexual activity: No     Other Topics Concern    Not on file     Social History Narrative       Patient does have an advanced directive on file    Visit Vitals    /78 (BP 1 Location: Right arm, BP Patient Position: Sitting)    Pulse 76    Temp 99 °F (37.2 °C) (Tympanic)    Resp 16    Ht 5' 1\" (1.549 m)    Wt 103 lb (46.7 kg)    SpO2 97%    BMI 19.46 kg/m2       Physical Exam   Neck: Carotid bruit is not present. Cardiovascular: Normal rate and regular rhythm. Exam reveals no gallop. No murmur heard. Pulmonary/Chest: She has no wheezes.  She has no rales. Abdominal: Soft. Bowel sounds are normal. She exhibits no distension. There is no tenderness. Musculoskeletal: She exhibits no edema. BMI:  [facility] Outpatient Visit on 02/22/2018   Component Date Value Ref Range Status    WBC 02/22/2018 5.1  4.6 - 13.2 K/uL Final    RBC 02/22/2018 3.33* 4.20 - 5.30 M/uL Final    HGB 02/22/2018 11.6* 12.0 - 16.0 g/dL Final    HCT 02/22/2018 31.7* 35.0 - 45.0 % Final    MCV 02/22/2018 95.2  74.0 - 97.0 FL Final    MCH 02/22/2018 34.8* 24.0 - 34.0 PG Final    MCHC 02/22/2018 36.6  31.0 - 37.0 g/dL Final    RDW 02/22/2018 13.6  11.6 - 14.5 % Final    PLATELET 70/95/5319 581  135 - 420 K/uL Final    MPV 02/22/2018 10.5  9.2 - 11.8 FL Final    NEUTROPHILS 02/22/2018 66  40 - 73 % Final    LYMPHOCYTES 02/22/2018 15* 21 - 52 % Final    MONOCYTES 02/22/2018 17* 3 - 10 % Final    EOSINOPHILS 02/22/2018 2  0 - 5 % Final    BASOPHILS 02/22/2018 0  0 - 2 % Final    ABS. NEUTROPHILS 02/22/2018 3.4  1.8 - 8.0 K/UL Final    ABS. LYMPHOCYTES 02/22/2018 0.8* 0.9 - 3.6 K/UL Final    ABS. MONOCYTES 02/22/2018 0.8  0.05 - 1.2 K/UL Final    ABS. EOSINOPHILS 02/22/2018 0.1  0.0 - 0.4 K/UL Final    ABS.  BASOPHILS 02/22/2018 0.0  0.0 - 0.06 K/UL Final    DF 02/22/2018 AUTOMATED    Final    Sodium 02/22/2018 138  136 - 145 mmol/L Final    Potassium 02/22/2018 4.2  3.5 - 5.5 mmol/L Final    Chloride 02/22/2018 103  100 - 108 mmol/L Final    CO2 02/22/2018 27  21 - 32 mmol/L Final    Anion gap 02/22/2018 8  3.0 - 18 mmol/L Final    Glucose 02/22/2018 60* 74 - 99 mg/dL Final    BUN 02/22/2018 34* 7.0 - 18 MG/DL Final    Creatinine 02/22/2018 1.05  0.6 - 1.3 MG/DL Final    BUN/Creatinine ratio 02/22/2018 32* 12 - 20   Final    GFR est AA 02/22/2018 60* >60 ml/min/1.73m2 Final    GFR est non-AA 02/22/2018 49* >60 ml/min/1.73m2 Final    Comment: (NOTE)  Estimated GFR is calculated using the Modification of Diet in Renal   Disease (MDRD) Study equation, reported for both  Americans   (GFRAA) and non- Americans (GFRNA), and normalized to 1.73m2   body surface area. The physician must decide which value applies to   the patient. The MDRD study equation should only be used in   individuals age 25 or older. It has not been validated for the   following: pregnant women, patients with serious comorbid conditions,   or on certain medications, or persons with extremes of body size,   muscle mass, or nutritional status.  Calcium 02/22/2018 9.5  8.5 - 10.1 MG/DL Final    Bilirubin, total 02/22/2018 0.2  0.2 - 1.0 MG/DL Final    ALT (SGPT) 02/22/2018 20  13 - 56 U/L Final    AST (SGOT) 02/22/2018 20  15 - 37 U/L Final    Alk.  phosphatase 02/22/2018 40* 45 - 117 U/L Final    Protein, total 02/22/2018 7.1  6.4 - 8.2 g/dL Final    Albumin 02/22/2018 3.6  3.4 - 5.0 g/dL Final    Globulin 02/22/2018 3.5  2.0 - 4.0 g/dL Final    A-G Ratio 02/22/2018 1.0  0.8 - 1.7   Final    TSH 02/22/2018 0.53  0.36 - 3.74 uIU/mL Final   Office Visit on 01/15/2018   Component Date Value Ref Range Status    Color (UA POC) 01/15/2018 Yellow   Final    Clarity (UA POC) 01/15/2018 Clear   Final    Glucose (UA POC) 01/15/2018 Negative  Negative Final    Bilirubin (UA POC) 01/15/2018 Negative  Negative Final    Ketones (UA POC) 01/15/2018 Negative  Negative Final    Specific gravity (UA POC) 01/15/2018 1.015  1.001 - 1.035 Final    Blood (UA POC) 01/15/2018 Trace  Negative Final    pH (UA POC) 01/15/2018 7.0  4.6 - 8.0 Final    Protein (UA POC) 01/15/2018 Negative  Negative Final    Urobilinogen (UA POC) 01/15/2018 0.2 mg/dL  0.2 - 1 Final    Nitrites (UA POC) 01/15/2018 Negative  Negative Final    Leukocyte esterase (UA POC) 01/15/2018 Negative  Negative Final       .  Results for orders placed or performed during the hospital encounter of 02/22/18   CBC WITH AUTOMATED DIFF   Result Value Ref Range    WBC 5.1 4.6 - 13.2 K/uL    RBC 3.33 (L) 4.20 - 5.30 M/uL HGB 11.6 (L) 12.0 - 16.0 g/dL    HCT 31.7 (L) 35.0 - 45.0 %    MCV 95.2 74.0 - 97.0 FL    MCH 34.8 (H) 24.0 - 34.0 PG    MCHC 36.6 31.0 - 37.0 g/dL    RDW 13.6 11.6 - 14.5 %    PLATELET 100 736 - 548 K/uL    MPV 10.5 9.2 - 11.8 FL    NEUTROPHILS 66 40 - 73 %    LYMPHOCYTES 15 (L) 21 - 52 %    MONOCYTES 17 (H) 3 - 10 %    EOSINOPHILS 2 0 - 5 %    BASOPHILS 0 0 - 2 %    ABS. NEUTROPHILS 3.4 1.8 - 8.0 K/UL    ABS. LYMPHOCYTES 0.8 (L) 0.9 - 3.6 K/UL    ABS. MONOCYTES 0.8 0.05 - 1.2 K/UL    ABS. EOSINOPHILS 0.1 0.0 - 0.4 K/UL    ABS. BASOPHILS 0.0 0.0 - 0.06 K/UL    DF AUTOMATED     METABOLIC PANEL, COMPREHENSIVE   Result Value Ref Range    Sodium 138 136 - 145 mmol/L    Potassium 4.2 3.5 - 5.5 mmol/L    Chloride 103 100 - 108 mmol/L    CO2 27 21 - 32 mmol/L    Anion gap 8 3.0 - 18 mmol/L    Glucose 60 (L) 74 - 99 mg/dL    BUN 34 (H) 7.0 - 18 MG/DL    Creatinine 1.05 0.6 - 1.3 MG/DL    BUN/Creatinine ratio 32 (H) 12 - 20      GFR est AA 60 (L) >60 ml/min/1.73m2    GFR est non-AA 49 (L) >60 ml/min/1.73m2    Calcium 9.5 8.5 - 10.1 MG/DL    Bilirubin, total 0.2 0.2 - 1.0 MG/DL    ALT (SGPT) 20 13 - 56 U/L    AST (SGOT) 20 15 - 37 U/L    Alk. phosphatase 40 (L) 45 - 117 U/L    Protein, total 7.1 6.4 - 8.2 g/dL    Albumin 3.6 3.4 - 5.0 g/dL    Globulin 3.5 2.0 - 4.0 g/dL    A-G Ratio 1.0 0.8 - 1.7     TSH 3RD GENERATION   Result Value Ref Range    TSH 0.53 0.36 - 3.74 uIU/mL       Assessment / Plan      ICD-10-CM ICD-9-CM    1. Controlled type 2 diabetes mellitus without complication, with long-term current use of insulin (HCC) E11.9 250.00 aspirin delayed-release 81 mg tablet    Z79.4 V58.67 bisacodyl (DULCOLAX, BISACODYL,) 5 mg EC tablet      EASY TOUCH INSULIN SAFETY SYR 0.5 mL 30 gauge x 5/16\" syrg      CBC WITH AUTOMATED DIFF      METABOLIC PANEL, COMPREHENSIVE      TSH 3RD GENERATION      MICROALBUMIN, UR, RAND W/ MICROALB/CREAT RATIO   2.  Hx of subdural hematoma Z86.79 V12.59 aspirin delayed-release 81 mg tablet bisacodyl (DULCOLAX, BISACODYL,) 5 mg EC tablet      EASY TOUCH INSULIN SAFETY SYR 0.5 mL 30 gauge x 5/16\" syrg      CBC WITH AUTOMATED DIFF      METABOLIC PANEL, COMPREHENSIVE      TSH 3RD GENERATION      MICROALBUMIN, UR, RAND W/ MICROALB/CREAT RATIO   3. Essential hypertension I10 401.9 aspirin delayed-release 81 mg tablet      bisacodyl (DULCOLAX, BISACODYL,) 5 mg EC tablet      EASY TOUCH INSULIN SAFETY SYR 0.5 mL 30 gauge x 5/16\" syrg      CBC WITH AUTOMATED DIFF      METABOLIC PANEL, COMPREHENSIVE      TSH 3RD GENERATION      MICROALBUMIN, UR, RAND W/ MICROALB/CREAT RATIO   4. 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 Overactive bladder N32.81 596.51        Labs  Change Ditropan to Myrbetriq  she was advised to continue her maintenance medications      Follow-up Disposition:  Return in about 4 months (around 6/22/2018). I asked Kelly Banks if she has any questions and I answered the questions.   Kelly Banks states that she understands the treatment plan and agrees with the treatment plan

## 2018-03-07 ENCOUNTER — TELEPHONE (OUTPATIENT)
Dept: INTERNAL MEDICINE CLINIC | Age: 83
End: 2018-03-07

## 2018-03-07 NOTE — TELEPHONE ENCOUNTER
Patient called for her lab results. She is very concerned about her sugar. Please call her at [phone].

## 2018-03-08 NOTE — TELEPHONE ENCOUNTER
I have attempted to contact this patient by phone with the following results: left message to return my call on answering machine. In reference to random glucose being good and that no Hemoglobin A1C has been performed lately and she can come to office for A! C if she would like.

## 2018-03-14 ENCOUNTER — TELEPHONE (OUTPATIENT)
Dept: INTERNAL MEDICINE CLINIC | Age: 83
End: 2018-03-14

## 2018-03-14 NOTE — TELEPHONE ENCOUNTER
Patient needs someone to send a order to [facility] for her to be able to get her blood pressure taken twice aday .  They will not do it unless they have a order thanks

## 2018-03-20 ENCOUNTER — TELEPHONE (OUTPATIENT)
Dept: INTERNAL MEDICINE CLINIC | Age: 83
End: 2018-03-20

## 2018-03-20 NOTE — TELEPHONE ENCOUNTER
Patient request Dr. Ana Singh discontinue her eye drops. She doesn't feel like they are helping and has no way to get to eye doctor. She also states Dr. Ana Singh wanted her blood pressure checked twice weekly due to the swelling she has in her feet. Facility needs an order to check her blood pressure, and also an order to discontinue eye drops.

## 2018-03-22 ENCOUNTER — TELEPHONE (OUTPATIENT)
Dept: INTERNAL MEDICINE CLINIC | Age: 83
End: 2018-03-22

## 2018-03-22 NOTE — TELEPHONE ENCOUNTER
Patient called to let Dr Mateo Triplett know that she no longer wants the Ciprofloxacin eye drops. She was told to call our office so that they do not get automatically filled again. She also wants Dr Mateo Triplett to send something over stating she needs her blood pressure checked twice a week. Please advise her.

## 2018-04-02 ENCOUNTER — OFFICE VISIT (OUTPATIENT)
Dept: UROLOGY | Age: 83
End: 2018-04-02

## 2018-04-02 VITALS
HEIGHT: 61 IN | OXYGEN SATURATION: 98 % | HEART RATE: 77 BPM | SYSTOLIC BLOOD PRESSURE: 138 MMHG | DIASTOLIC BLOOD PRESSURE: 78 MMHG

## 2018-04-02 DIAGNOSIS — R32 URINARY INCONTINENCE, UNSPECIFIED TYPE: Primary | ICD-10-CM

## 2018-04-02 DIAGNOSIS — N95.2 ATROPHIC VAGINITIS: ICD-10-CM

## 2018-04-02 DIAGNOSIS — N34.2 ATROPHIC URETHRITIS: ICD-10-CM

## 2018-04-02 LAB
BILIRUB UR QL STRIP: NEGATIVE
GLUCOSE UR-MCNC: NEGATIVE MG/DL
KETONES P FAST UR STRIP-MCNC: NEGATIVE MG/DL
PH UR STRIP: 6.5 [PH] (ref 4.6–8)
PROT UR QL STRIP: NEGATIVE
SP GR UR STRIP: 1.01 (ref 1–1.03)
UA UROBILINOGEN AMB POC: NORMAL (ref 0.2–1)
URINALYSIS CLARITY POC: CLEAR
URINALYSIS COLOR POC: YELLOW
URINE BLOOD POC: NEGATIVE
URINE LEUKOCYTES POC: NEGATIVE
URINE NITRITES POC: NEGATIVE

## 2018-04-02 NOTE — PROGRESS NOTES
Ms. Kamini Bedolla has a reminder for a \"due or due soon\" health maintenance. I have asked that she contact her primary care provider for follow-up on this health maintenance.

## 2018-04-02 NOTE — PATIENT INSTRUCTIONS
Urge Incontinence in Women: Care Instructions  Your Care Instructions    Urge incontinence occurs when the need to urinate is so strong that you cannot reach the toilet in time, even when your bladder contains only a small amount of urine. This is also called overactive bladder or unstable bladder. Some women may have no warning before they leak urine. This condition does not cause major health problems. But it can be embarrassing and can affect a woman's self-esteem and confidence. Treatment can cure or improve your symptoms. Follow-up care is a key part of your treatment and safety. Be sure to make and go to all appointments, and call your doctor if you are having problems. It's also a good idea to know your test results and keep a list of the medicines you take. How can you care for yourself at home? · Be safe with medicines. Take your medicines exactly as prescribed. Call your doctor if you think you are having a problem with your medicine. You will get more details on the specific medicines your doctor prescribes. · Limit caffeine and alcohol. They stimulate urine production. · Urinate every 2 to 4 hours during waking hours, even if you feel that you do not have to go. · Do pelvic floor (Kegel) exercises, which tighten and strengthen pelvic muscles. To do Kegel exercises:  ¨ Squeeze the same muscles you would use to stop your urine. Your belly and thighs should not move. ¨ Hold the squeeze for 3 seconds, then relax for 3 seconds. ¨ Start with 3 seconds. Then add 1 second each week until you are able to squeeze for 10 seconds. ¨ Repeat the exercise 10 to 15 times for each session. Do three or more sessions each day. · Try wearing pads that absorb the leaks. · Keep skin in the genital area dry. When should you call for help? Call your doctor now or seek immediate medical care if:  ? · You have new urinary symptoms.  These may include leaking urine, having pain when urinating, or feeling like you need to urinate often. ? Watch closely for changes in your health, and be sure to contact your doctor if:  ? · You do not get better as expected. Where can you learn more? Go to http://penelope-jennifer.info/. Enter W734 in the search box to learn more about \"Urge Incontinence in Women: Care Instructions. \"  Current as of: October 13, 2016  Content Version: 11.4  © 4460-7082 [employer]. Care instructions adapted under license by [employer] (which disclaims liability or warranty for this information). If you have questions about a medical condition or this instruction, always ask your healthcare professional. [address] any warranty or liability for your use of this information.

## 2018-04-02 NOTE — MR AVS SNAPSHOT
[address] [facility] [place] A [address] [address] 
[phone] Patient: Salena Velazquez MRN: [id number] Visit Information Date & Time Provider Department Dept. Phone Encounter #  
 2018  1:30 PM [address] [facility] [phone] [id number] Your Appointments 2018  2:00 PM  
Office Visit with Caridad Joyner MD  
[facility] [address] [place] [address]) Appt Note: check up [address] [place] A [address] [address]  
[phone] [address] 600 [facility] [address] Upcoming Health Maintenance Date Due  
 FOOT EXAM Q1 1936 ZOSTER VACCINE AGE 60> 1986 Bone Densitometry (Dexa) Screening 1991 Pneumococcal 65+ Low/Medium Risk (1 of 2 - PCV13) 1991 MICROALBUMIN Q1 2017 Influenza Age 5 to Adult 2017 MEDICARE YEARLY EXAM 3/14/2018 HEMOGLOBIN A1C Q6M 2018 LIPID PANEL Q1 5/3/2018 EYE EXAM RETINAL OR DILATED Q1 2018 GLAUCOMA SCREENING Q2Y 2019 DTaP/Tdap/Td series (2 - Td) 3/9/2025 Allergies as of 2018  Review Complete On: 2018 By: Jamel Louie LPN Severity Noted Reaction Type Reactions Nitrofurantoin  2015   Systemic Nausea and Vomiting Current Immunizations  Never Reviewed Name Date Influenza High Dose Vaccine PF 2015 Tdap 3/9/2015  9:28 PM  
  
 Not reviewed this visit You Were Diagnosed With   
  
 Codes Comments Urinary incontinence, unspecified type    -  Primary ICD-10-CM: R32 
ICD-9-CM: 788.30 Vitals BP Pulse Height(growth percentile) SpO2 OB Status Smoking Status 138/78 (BP 1 Location: Right arm, BP Patient Position: Sitting) 77 5' 1\" (1.549 m) 98% Postmenopausal Never Smoker Vitals History Preferred Pharmacy Pharmacy Name Phone [facility] [address] 2/3 [phone] Your Updated Medication List  
  
   
This list is accurate as of 4/2/18  2:51 PM.  Always use your most recent med list.  
  
  
  
  
 alendronate 70 mg tablet Commonly known as:  FOSAMAX TAKE 1 TABLET WEEKLY  
  
 amLODIPine 5 mg tablet Commonly known as:  Edith Xavier Take 1 Tab by mouth daily. [employer] CONTOUR strip Generic drug:  glucose blood VI test strips USE TO TEST BLOOD SUGARS THREE TIMES DAILY  
  
 aspirin delayed-release 81 mg tablet Take 81 mg by mouth daily. * DULCOLAX (BISACODYL) 5 mg EC tablet Generic drug:  bisacodyl Take 5 mg by mouth two (2) times daily as needed for Constipation. * bisacodyl 10 mg suppository Commonly known as:  DULCOLAX Insert 10 mg into rectum daily. cephALEXin 500 mg capsule Commonly known as:  KEFLEX  
1 cap twice per day for one week - Dx:  Eye Infectin  
  
 ciprofloxacin HCl 0.3 % ophthalmic solution Commonly known as:  CILOXAN  
2 drops in left eye three times per day  Dx:  Eye Infection EASY TOUCH INSULIN SAFETY SYR 0.5 mL 30 gauge x 5/16\" Syrg Generic drug:  insulin syringe,safetyneedle Use subcutaneously as directed with insulin. estradiol 0.01 % (0.1 mg/gram) vaginal cream  
Commonly known as:  ESTRACE Apply 0.5 grams to urinary meatus twice a week. FEOSOL 325 mg (65 mg iron) tablet Generic drug:  ferrous sulfate Take 325 mg by mouth Daily (before breakfast). FISH OIL 1,000 mg Cap Generic drug:  omega-3 fatty acids-vitamin e Take 1 Cap by mouth daily. FLEET ENEMA RE Insert 1 Dose into rectum daily as needed (Constipation (if no BM, notify MD)). glimepiride 2 mg tablet Commonly known as:  AMARYL  
1 tablet at breakfast.  1 tablet at supper LANTUS U-100 INSULIN 100 unit/mL injection Generic drug:  insulin glargine 5 Units by SubCUTAneous route daily (with breakfast). lovastatin 20 mg tablet Commonly known as:  MEVACOR  
TAKE 1 TABLET DAILY  
  
 melatonin 3 mg tablet Take 3 mg by mouth nightly as needed. mirabegron ER 25 mg ER tablet Commonly known as:  MYRBETRIQ  
1 tablet daily   Dx:  Overactive bladder MIRALAX 17 gram packet Generic drug:  polyethylene glycol Take 17 g by mouth two (2) times daily as needed (Constipation). NovoLOG U-100 Insulin aspart 100 unit/mL injection Generic drug:  insulin aspart U-100  
by SubCUTAneous route. Inject subcutaneously per sliding scale before meals and at bedtime - -250mg/dL = 2 units, 251-300 mg/dL = 4 units, 301-350 mg/dL 6 units, 351-400 mg/dL = 8 units, 401-450 mg/dL = 10 units (call MD if < 70 or > 400 mg/dL)  Indications: sliding scale ONE-A-DAY WOMEN'S 50 PLUS PO Take 1 Tab by mouth daily. oxybutynin 5 mg tablet Commonly known as:  OONXMSYF Take 1 Tab by mouth nightly. TRUEPLUS LANCETS 30 gauge Misc Generic drug:  lancets Use to check blood glucose up to twice daily DX:e11.9 TYLENOL 325 mg tablet Generic drug:  acetaminophen Take 650 mg by mouth every four (4) hours as needed (Pain/Temp (no more than 3 grams APAP/24 hours)). VISINE-A 0.025-0.3 % ophthalmic solution Generic drug:  naphazoline-pheniramine Administer 2 Drops to both eyes three (3) times daily as needed (Eye irritation). * Notice: This list has 2 medication(s) that are the same as other medications prescribed for you. Read the directions carefully, and ask your doctor or other care provider to review them with you. We Performed the Following AMB POC URINALYSIS DIP STICK AUTO W/O MICRO [06547 CPT(R)] Patient Instructions Urge Incontinence in Women: Care Instructions Your Care Instructions Urge incontinence occurs when the need to urinate is so strong that you cannot reach the toilet in time, even when your bladder contains only a small amount of urine. This is also called overactive bladder or unstable bladder. Some women may have no warning before they leak urine. This condition does not cause major health problems. But it can be embarrassing and can affect a woman's self-esteem and confidence. Treatment can cure or improve your symptoms. Follow-up care is a key part of your treatment and safety. Be sure to make and go to all appointments, and call your doctor if you are having problems. It's also a good idea to know your test results and keep a list of the medicines you take. How can you care for yourself at home? · Be safe with medicines. Take your medicines exactly as prescribed. Call your doctor if you think you are having a problem with your medicine. You will get more details on the specific medicines your doctor prescribes. · Limit caffeine and alcohol. They stimulate urine production. · Urinate every 2 to 4 hours during waking hours, even if you feel that you do not have to go. · Do pelvic floor (Kegel) exercises, which tighten and strengthen pelvic muscles. To do Kegel exercises: 
¨ Squeeze the same muscles you would use to stop your urine. Your belly and thighs should not move. ¨ Hold the squeeze for 3 seconds, then relax for 3 seconds. ¨ Start with 3 seconds. Then add 1 second each week until you are able to squeeze for 10 seconds. ¨ Repeat the exercise 10 to 15 times for each session. Do three or more sessions each day. · Try wearing pads that absorb the leaks. · Keep skin in the genital area dry. When should you call for help? Call your doctor now or seek immediate medical care if: 
? · You have new urinary symptoms. These may include leaking urine, having pain when urinating, or feeling like you need to urinate often. ? Watch closely for changes in your health, and be sure to contact your doctor if: 
? · You do not get better as expected. Where can you learn more? Go to http://penelope-jennifer.info/. Enter D740 in the search box to learn more about \"Urge Incontinence in Women: Care Instructions. \" Current as of: October 13, 2016 Content Version: 11.4 © 1480-4558 [employer]. Care instructions adapted under license by [employer] (which disclaims liability or warranty for this information). If you have questions about a medical condition or this instruction, always ask your healthcare professional. [address] any warranty or liability for your use of this information. Introducing [facility]! Paddy Ruano introduces Dealo patient portal. Now you can access parts of your medical record, email your doctor's office, and request medication refills online. 1. In your internet browser, go to [email or web address] 2. Click on the First Time User? Click Here link in the Sign In box. You will see the New Member Sign Up page. 3. Enter your Dealo Access Code exactly as it appears below. You will not need to use this code after youve completed the sign-up process. If you do not sign up before the expiration date, you must request a new code. · Dealo Access Code: [email or web address] Expires: 4/15/2018 11:47 AM 
 
4. Enter the last four digits of your Social Security Number (xxxx) and Date of Birth (mm/dd/yyyy) as indicated and click Submit. You will be taken to the next sign-up page. 5. Create a Dealo ID. This will be your Dealo login ID and cannot be changed, so think of one that is secure and easy to remember. 6. Create a Dealo password. You can change your password at any time. 7. Enter your Password Reset Question and Answer. This can be used at a later time if you forget your password. 8. Enter your e-mail address. You will receive e-mail notification when new information is available in [address] 9. Click Sign Up. You can now view and download portions of your medical record. 10. Click the Download Summary menu link to download a portable copy of your medical information. If you have questions, please visit the Frequently Asked Questions section of the [facility] website. Remember, [facility] is NOT to be used for urgent needs. For medical emergencies, dial 911. Now available from your iPhone and Android! Please provide this summary of care documentation to your next provider. Your primary care clinician is listed as Sheeba Domínguez. If you have any questions after today's visit, please call [phone].

## 2018-04-02 NOTE — PROGRESS NOTES
Chief Complaint   Patient presents with    Urinary Incontinence       HISTORY OF PRESENT ILLNESS:  Jorge Hickman is a 80 y.o. female who comes back into the office today still complaining of urinary incontinence which is gradually worsening. Currently she is taking both oxybutynin as well as Myrbetriq and I had added topical Estrace cream the last time she was here. However the I think it is pretty obvious she is not getting it dosed appropriately so I again showed her how to do this. I would like for her to try this for at least a couple of months and if she is still unable to maintain any kind of urinary control, I would like to send her to Dr. Judy Baez at that time.     Past Medical History:   Diagnosis Date    Abnormality of gait     Acute upper respiratory infections of unspecified site     Anemia     Broken shoulder     right    Chest pain     Chronic airway obstruction, not elsewhere classified     COPD     Diabetes ([address]     Essential hypertension, benign     Hearing loss     History of tachycardia     possible SVT    Hypercholesterolemia     Hyperlipidemia     Hypertension     Incontinence     Mitral valve prolapse     MVP (mitral valve prolapse)     OAB (overactive bladder)     Osteoarthritis     spine    Osteoporosis     Pure hypercholesterolemia     Senile osteoporosis     Thyroid disease     Thyrotoxicosis     Type II or unspecified type diabetes mellitus without mention of complication, not stated as uncontrolled     Urinary tract infection, site not specified        Past Surgical History:   Procedure Laterality Date    HX KNEE REPLACEMENT  left    HX OPEN REDUCTION INTERNAL FIXATION Left     patellar fracture    HX OTHER SURGICAL Right     shoulder surgery    REMV CATARACT EXTRACAP,INSERT LENS,COMP  [id number]    bilateral       Social History   Substance Use Topics    Smoking status: Never Smoker    Smokeless tobacco: Never Used    Alcohol use No Allergies   Allergen Reactions    Nitrofurantoin Nausea and Vomiting       Family History   Problem Relation Age of Onset    Diabetes Mother     Alcohol abuse Father    Yahir Carrington Arthritis-rheumatoid Sister     Diabetes Brother     Other Brother      eye problems    Hypertension Son     Diabetes Son     Cancer Son      Pancreatic cancer    Alcohol abuse Son        Current Outpatient Prescriptions   Medication Sig Dispense Refill    aspirin delayed-release 81 mg tablet Take 81 mg by mouth daily.  bisacodyl (DULCOLAX, BISACODYL,) 5 mg EC tablet Take 5 mg by mouth two (2) times daily as needed for Constipation.  EASY TOUCH INSULIN SAFETY SYR 0.5 mL 30 gauge x 5/16\" syrg Use subcutaneously as directed with insulin.  mirabegron ER (MYRBETRIQ) 25 mg ER tablet 1 tablet daily   Dx:  Overactive bladder 30 Tab 2    MV-MN/FOLIC ACID/CALCIUM/VIT K (ONE-A-DAY WOMEN'S 50 PLUS PO) Take 1 Tab by mouth daily.  estradiol (ESTRACE) 0.01 % (0.1 mg/gram) vaginal cream Apply 0.5 grams to urinary meatus twice a week. 42.5 g 0    naphazoline-pheniramine (VISINE-A) 0.025-0.3 % ophthalmic solution Administer 2 Drops to both eyes three (3) times daily as needed (Eye irritation).  acetaminophen (TYLENOL) 325 mg tablet Take 650 mg by mouth every four (4) hours as needed (Pain/Temp (no more than 3 grams APAP/24 hours)).  insulin aspart (NOVOLOG) 100 unit/mL injection by SubCUTAneous route. Inject subcutaneously per sliding scale before meals and at bedtime - -250mg/dL = 2 units, 251-300 mg/dL = 4 units, 301-350 mg/dL 6 units, 351-400 mg/dL = 8 units, 401-450 mg/dL = 10 units (call MD if < 70 or > 400 mg/dL)  Indications: sliding scale      SOD PHOS,M-B/NA PHOS,DI-BA (FLEET ENEMA RE) Insert 1 Dose into rectum daily as needed (Constipation (if no BM, notify MD)).  amLODIPine (NORVASC) 5 mg tablet Take 1 Tab by mouth daily.  30 Tab 0    insulin glargine (LANTUS) 100 unit/mL injection 5 Units by SubCUTAneous route daily (with breakfast).  polyethylene glycol (MIRALAX) 17 gram packet Take 17 g by mouth two (2) times daily as needed (Constipation).  TRUEPLUS LANCETS 30 gauge misc Use to check blood glucose up to twice daily DX:e11.9 300 Lancet 3    glimepiride (AMARYL) 2 mg tablet 1 tablet at breakfast.  1 tablet at supper 180 Tab 3    alendronate (FOSAMAX) 70 mg tablet TAKE 1 TABLET WEEKLY 12 Tab 3    lovastatin (MEVACOR) 20 mg tablet TAKE 1 TABLET DAILY 90 Tab 3    ASCENSIA CONTOUR strip USE TO TEST BLOOD SUGARS THREE TIMES DAILY (Patient taking differently: USE TO TEST BLOOD SUGARS THREE TIMES DAILY--embrace strips) 100 Strip 11    melatonin 3 mg tablet Take 3 mg by mouth nightly as needed.  omega-3 fatty acids-vitamin e (FISH OIL) 1,000 mg Cap Take 1 Cap by mouth daily.  cephALEXin (KEFLEX) 500 mg capsule 1 cap twice per day for one week - Dx:  Eye Infectin 14 Cap 0    ciprofloxacin HCl (CILOXAN) 0.3 % ophthalmic solution 2 drops in left eye three times per day  Dx:  Eye Infection 2.5 mL 2    oxybutynin (DITROPAN) 5 mg tablet Take 1 Tab by mouth nightly. 30 Tab 5    bisacodyl (DULCOLAX) 10 mg suppository Insert 10 mg into rectum daily. (Patient taking differently: Insert 10 mg into rectum daily as needed (Constipation). ) 30 Suppository 1    ferrous sulfate (FEOSOL) 325 mg (65 mg iron) tablet Take 325 mg by mouth Daily (before breakfast). REVIEW OF SYSTEMS:   Documented on the chart      PHYSICAL EXAMINATION:     Visit Vitals    /78 (BP 1 Location: Right arm, BP Patient Position: Sitting)    Pulse 77    Ht 5' 1\" (1.549 m)    SpO2 98%     Constitutional: Well developed, well-nourished female in no acute distress. CV:  No peripheral swelling noted  Respiratory: No respiratory distress or difficulties  Abdomen:  Soft and nontender. No masses. No hepatosplenomegaly.  Female: Again, pelvic exam shows severe atrophic changes.   I demonstrated to her the proper location to apply topical Estrace cream vaginally. Skin:  Normal color. No evidence of jaundice. Neuro/Psych:  Patient with appropriate affect. Alert and oriented. Lymphatic:   No enlargement of supraclavicular lymph nodes. Results for orders placed or performed in visit on 04/02/18   AMB POC URINALYSIS DIP STICK AUTO W/O MICRO   Result Value Ref Range    Color (UA POC) Yellow     Clarity (UA POC) Clear     Glucose (UA POC) Negative Negative    Bilirubin (UA POC) Negative Negative    Ketones (UA POC) Negative Negative    Specific gravity (UA POC) 1.010 1.001 - 1.035    Blood (UA POC) Negative Negative    pH (UA POC) 6.5 4.6 - 8.0    Protein (UA POC) Negative Negative    Urobilinogen (UA POC) 0.2 mg/dL 0.2 - 1    Nitrites (UA POC) Negative Negative    Leukocyte esterase (UA POC) Negative Negative         REVIEW OF LABS AND IMAGING:      Imaging Report Reviewed? NO      Images Reviewed? NO           Other Lab Data Reviewed? YES    ASSESSMENT:     ICD-10-CM ICD-9-CM    1. Urinary incontinence, unspecified type R32 788.30 AMB POC URINALYSIS DIP STICK AUTO W/O MICRO   2. 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 Atrophic vaginitis N95.2 627.3                 PLAN/DISCUSSION: I give her another 2 months to try to use the topical Estrace cream.  If she is unable to satisfactorily improve her continence, I would like to refer her for urodynamic evaluation. Patient voices understanding and agreement to the plan. Martir Horta MD on 4/2/2018           Please note: This document has been produced using voice recognition software. Unrecognized errors in transcription may be present.

## 2018-04-19 ENCOUNTER — TELEPHONE (OUTPATIENT)
Dept: INTERNAL MEDICINE CLINIC | Age: 83
End: 2018-04-19

## 2018-04-19 NOTE — TELEPHONE ENCOUNTER
Patient has a decision to make and she needs to speak to Dr Zhang Reason.  She dose not want a nurse to call she wants Dr Jerri Oppenheim

## 2018-04-30 ENCOUNTER — TELEPHONE (OUTPATIENT)
Dept: INTERNAL MEDICINE CLINIC | Age: 83
End: 2018-04-30

## 2018-04-30 DIAGNOSIS — E11.21 TYPE 2 DIABETES MELLITUS WITH NEPHROPATHY (HCC): ICD-10-CM

## 2018-04-30 DIAGNOSIS — R30.0 DYSURIA: ICD-10-CM

## 2018-04-30 DIAGNOSIS — Z79.4 CONTROLLED TYPE 2 DIABETES MELLITUS WITHOUT COMPLICATION, WITH LONG-TERM CURRENT USE OF INSULIN (HCC): Primary | ICD-10-CM

## 2018-04-30 DIAGNOSIS — E11.9 CONTROLLED TYPE 2 DIABETES MELLITUS WITHOUT COMPLICATION, WITH LONG-TERM CURRENT USE OF INSULIN (HCC): Primary | ICD-10-CM

## 2018-04-30 NOTE — TELEPHONE ENCOUNTER
Patient called and stated she left a message a few weeks ago for Dr Amandeep Carrillo to call her and she has not heard from him. She is now having symptoms of UTI and she thinks her blood sugar is high. She is wanting to know if he is coming anytime soon to [facility]. Please advise [phone].

## 2018-05-02 ENCOUNTER — OFFICE VISIT (OUTPATIENT)
Dept: INTERNAL MEDICINE CLINIC | Age: 83
End: 2018-05-02

## 2018-05-02 ENCOUNTER — HOSPITAL ENCOUNTER (OUTPATIENT)
Dept: LAB | Age: 83
Discharge: HOME | End: 2018-05-02
Payer: MEDICARE

## 2018-05-02 VITALS
RESPIRATION RATE: 20 BRPM | WEIGHT: 100 LBS | TEMPERATURE: 97.8 F | HEART RATE: 75 BPM | DIASTOLIC BLOOD PRESSURE: 60 MMHG | SYSTOLIC BLOOD PRESSURE: 130 MMHG | HEIGHT: 61 IN | BODY MASS INDEX: 18.88 KG/M2

## 2018-05-02 DIAGNOSIS — R30.0 DYSURIA: ICD-10-CM

## 2018-05-02 DIAGNOSIS — E78.00 HYPERCHOLESTEROLEMIA: ICD-10-CM

## 2018-05-02 DIAGNOSIS — E05.90 HYPERTHYROIDISM: ICD-10-CM

## 2018-05-02 DIAGNOSIS — E11.9 CONTROLLED TYPE 2 DIABETES MELLITUS WITHOUT COMPLICATION, WITH LONG-TERM CURRENT USE OF INSULIN (HCC): ICD-10-CM

## 2018-05-02 DIAGNOSIS — Z79.4 CONTROLLED TYPE 2 DIABETES MELLITUS WITHOUT COMPLICATION, WITH LONG-TERM CURRENT USE OF INSULIN (HCC): ICD-10-CM

## 2018-05-02 DIAGNOSIS — I10 ESSENTIAL HYPERTENSION: ICD-10-CM

## 2018-05-02 DIAGNOSIS — R30.0 DYSURIA: Primary | ICD-10-CM

## 2018-05-02 LAB
ALBUMIN SERPL-MCNC: 3.9 G/DL (ref 3.4–5)
ALBUMIN/GLOB SERPL: 1.1 {RATIO} (ref 0.8–1.7)
ALP SERPL-CCNC: 44 U/L (ref 45–117)
ALT SERPL-CCNC: 30 U/L (ref 13–56)
ANION GAP SERPL CALC-SCNC: 8 MMOL/L (ref 3–18)
AST SERPL-CCNC: 23 U/L (ref 15–37)
BASOPHILS # BLD: 0 K/UL (ref 0–0.06)
BASOPHILS NFR BLD: 0 % (ref 0–2)
BILIRUB SERPL-MCNC: 0.3 MG/DL (ref 0.2–1)
BILIRUB UR QL STRIP: NEGATIVE
BUN SERPL-MCNC: 35 MG/DL (ref 7–18)
BUN/CREAT SERPL: 35 (ref 12–20)
CALCIUM SERPL-MCNC: 10.2 MG/DL (ref 8.5–10.1)
CHLORIDE SERPL-SCNC: 101 MMOL/L (ref 100–108)
CHOLEST SERPL-MCNC: 183 MG/DL
CO2 SERPL-SCNC: 29 MMOL/L (ref 21–32)
CREAT SERPL-MCNC: 1.01 MG/DL (ref 0.6–1.3)
DIFFERENTIAL METHOD BLD: ABNORMAL
EOSINOPHIL # BLD: 0.1 K/UL (ref 0–0.4)
EOSINOPHIL NFR BLD: 1 % (ref 0–5)
ERYTHROCYTE [DISTWIDTH] IN BLOOD BY AUTOMATED COUNT: 13.6 % (ref 11.6–14.5)
EST. AVERAGE GLUCOSE BLD GHB EST-MCNC: 140 MG/DL
GLOBULIN SER CALC-MCNC: 3.7 G/DL (ref 2–4)
GLUCOSE SERPL-MCNC: 64 MG/DL (ref 74–99)
GLUCOSE UR-MCNC: NEGATIVE MG/DL
HBA1C MFR BLD: 6.5 % (ref 4.2–5.6)
HCT VFR BLD AUTO: 34.1 % (ref 35–45)
HDLC SERPL-MCNC: 94 MG/DL (ref 40–60)
HDLC SERPL: 1.9 {RATIO} (ref 0–5)
HGB BLD-MCNC: 12.4 G/DL (ref 12–16)
KETONES P FAST UR STRIP-MCNC: NEGATIVE MG/DL
LDLC SERPL CALC-MCNC: 67.2 MG/DL (ref 0–100)
LIPID PROFILE,FLP: ABNORMAL
LYMPHOCYTES # BLD: 1.1 K/UL (ref 0.9–3.6)
LYMPHOCYTES NFR BLD: 21 % (ref 21–52)
MCH RBC QN AUTO: 35.1 PG (ref 24–34)
MCHC RBC AUTO-ENTMCNC: 36.4 G/DL (ref 31–37)
MCV RBC AUTO: 96.6 FL (ref 74–97)
MONOCYTES # BLD: 0.6 K/UL (ref 0.05–1.2)
MONOCYTES NFR BLD: 12 % (ref 3–10)
NEUTS SEG # BLD: 3.4 K/UL (ref 1.8–8)
NEUTS SEG NFR BLD: 66 % (ref 40–73)
PH UR STRIP: 7 [PH] (ref 4.6–8)
PLATELET # BLD AUTO: 247 K/UL (ref 135–420)
PMV BLD AUTO: 10.1 FL (ref 9.2–11.8)
POTASSIUM SERPL-SCNC: 4.2 MMOL/L (ref 3.5–5.5)
PROT SERPL-MCNC: 7.6 G/DL (ref 6.4–8.2)
PROT UR QL STRIP: NEGATIVE
RBC # BLD AUTO: 3.53 M/UL (ref 4.2–5.3)
SODIUM SERPL-SCNC: 138 MMOL/L (ref 136–145)
SP GR UR STRIP: 1.02 (ref 1–1.03)
T3FREE SERPL-MCNC: 3.2 PG/ML (ref 2.18–3.98)
T4 FREE SERPL-MCNC: 0.9 NG/DL (ref 0.7–1.5)
TRIGL SERPL-MCNC: 109 MG/DL (ref ?–150)
TSH SERPL DL<=0.05 MIU/L-ACNC: 0.49 UIU/ML (ref 0.36–3.74)
UA UROBILINOGEN AMB POC: NORMAL (ref 0.2–1)
URINALYSIS CLARITY POC: CLEAR
URINALYSIS COLOR POC: YELLOW
URINE BLOOD POC: NORMAL
URINE LEUKOCYTES POC: NEGATIVE
URINE NITRITES POC: NEGATIVE
VLDLC SERPL CALC-MCNC: 21.8 MG/DL
WBC # BLD AUTO: 5.1 K/UL (ref 4.6–13.2)

## 2018-05-02 PROCEDURE — 80061 LIPID PANEL: CPT | Performed by: INTERNAL MEDICINE

## 2018-05-02 PROCEDURE — 84481 FREE ASSAY (FT-3): CPT | Performed by: INTERNAL MEDICINE

## 2018-05-02 PROCEDURE — 80053 COMPREHEN METABOLIC PANEL: CPT | Performed by: INTERNAL MEDICINE

## 2018-05-02 PROCEDURE — 87077 CULTURE AEROBIC IDENTIFY: CPT | Performed by: INTERNAL MEDICINE

## 2018-05-02 PROCEDURE — 83036 HEMOGLOBIN GLYCOSYLATED A1C: CPT | Performed by: INTERNAL MEDICINE

## 2018-05-02 PROCEDURE — 85025 COMPLETE CBC W/AUTO DIFF WBC: CPT | Performed by: INTERNAL MEDICINE

## 2018-05-02 PROCEDURE — 36415 COLL VENOUS BLD VENIPUNCTURE: CPT | Performed by: INTERNAL MEDICINE

## 2018-05-02 PROCEDURE — 87086 URINE CULTURE/COLONY COUNT: CPT | Performed by: INTERNAL MEDICINE

## 2018-05-02 PROCEDURE — 84443 ASSAY THYROID STIM HORMONE: CPT | Performed by: INTERNAL MEDICINE

## 2018-05-02 PROCEDURE — 84439 ASSAY OF FREE THYROXINE: CPT | Performed by: INTERNAL MEDICINE

## 2018-05-02 NOTE — PROGRESS NOTES
1. Have you been to the ER, urgent care clinic since your last visit? Hospitalized since your last visit? No    2. Have you seen or consulted any other health care providers outside of the [address] [place] [address] since your last visit? Include any pap smears or colon screening.  No

## 2018-05-02 NOTE — MR AVS SNAPSHOT
[address] [facility] 
 
 
 [address], [address] [place] [address] 
[phone] Patient: Maryellen Franks MRN: [id number] Visit Information Date & Time Provider Department Dept. Phone Encounter #  
 2018 12:00 PM Ebenezer Billingsley MD [facility] INTERNAL MEDICINE OF [facility]iel [facility]les [phone] [id number] Your Appointments 2018  2:00 PM  
Office Visit with Olayinka Abdi MD  
[facility]) Appt Note: check up [address] [address]  
[phone] [address] [id number]  
  
    
 2018 11:15 AM  
Follow Up with Ebenezer Billingsley MD  
[facility]NAL MEDICINE OF [facility] Appt Note: 3 mo f/u  
 [address], [address] [place] [facility] [address]  
  
   
 [address], [address] [facility] [address] Upcoming Health Maintenance Date Due  
 FOOT EXAM Q1 1936 ZOSTER VACCINE AGE 60> 1986 Bone Densitometry (Dexa) Screening 1991 Pneumococcal 65+ Low/Medium Risk (1 of 2 - PCV13) 1991 MICROALBUMIN Q1 2017 MEDICARE YEARLY EXAM 3/14/2018 HEMOGLOBIN A1C Q6M 2018 LIPID PANEL Q1 5/3/2018 EYE EXAM RETINAL OR DILATED Q1 2018 Influenza Age 5 to Adult 2018 GLAUCOMA SCREENING Q2Y 2019 DTaP/Tdap/Td series (2 - Td) 3/9/2025 Allergies as of 2018  Review Complete On: 2018 By: Jason Schaffer LPN Severity Noted Reaction Type Reactions Nitrofurantoin  2015   Systemic Nausea and Vomiting Current Immunizations  Never Reviewed Name Date Influenza High Dose Vaccine PF 2015 Tdap 3/9/2015  9:28 PM  
  
 Not reviewed this visit You Were Diagnosed With   
  
 Codes Comments Dysuria    -  Primary ICD-10-CM: R30.0 ICD-9-CM: 788.1 Controlled type 2 diabetes mellitus without complication, with long-term current use of insulin (HCC)     ICD-10-CM: E11.9, Z79.4 ICD-9-CM: 250.00, V58.67 Hypercholesterolemia     ICD-10-CM: E78.00 ICD-9-CM: 272.0 Essential hypertension     ICD-10-CM: I10 
ICD-9-CM: 401.9 Hyperthyroidism     ICD-10-CM: E05.90 ICD-9-CM: 242.90 Vitals BP Pulse Temp Resp Height(growth percentile) 130/60 (BP 1 Location: Right arm, BP Patient Position: Sitting) 75 97.8 °F (36.6 °C) (Tympanic) 20 5' 1\" (1.549 m) Weight(growth percentile) BMI OB Status Smoking Status 100 lb (45.4 kg) 18.89 kg/m2 Postmenopausal Never Smoker Vitals History BMI and BSA Data Body Mass Index Body Surface Area  
 18.89 kg/m 2 1.4 m 2 Preferred Pharmacy Pharmacy Name Phone [facility] [address] [address] [facility] 2/3 [phone] Your Updated Medication List  
  
   
This list is accurate as of 5/2/18  2:21 PM.  Always use your most recent med list.  
  
  
  
  
 alendronate 70 mg tablet Commonly known as:  FOSAMAX TAKE 1 TABLET WEEKLY  
  
 amLODIPine 5 mg tablet Commonly known as:  Job Dub Take 1 Tab by mouth daily. [employer] CONTOUR strip Generic drug:  glucose blood VI test strips USE TO TEST BLOOD SUGARS THREE TIMES DAILY  
  
 aspirin delayed-release 81 mg tablet Take 81 mg by mouth daily. * DULCOLAX (BISACODYL) 5 mg EC tablet Generic drug:  bisacodyl Take 5 mg by mouth two (2) times daily as needed for Constipation. * bisacodyl 10 mg suppository Commonly known as:  DULCOLAX Insert 10 mg into rectum daily. cephALEXin 500 mg capsule Commonly known as:  KEFLEX  
1 cap twice per day for one week - Dx:  Eye Infectin  
  
 ciprofloxacin HCl 0.3 % ophthalmic solution Commonly known as:  CILOXAN  
2 drops in left eye three times per day  Dx:  Eye Infection EASY TOUCH INSULIN SAFETY SYR 0.5 mL 30 gauge x 5/16\" Syrg Generic drug:  insulin syringe,safetyneedle Use subcutaneously as directed with insulin. estradiol 0.01 % (0.1 mg/gram) vaginal cream  
Commonly known as:  ESTRACE Apply 0.5 grams to urinary meatus twice a week. FEOSOL 325 mg (65 mg iron) tablet Generic drug:  ferrous sulfate Take 325 mg by mouth Daily (before breakfast). FISH OIL 1,000 mg Cap Generic drug:  omega-3 fatty acids-vitamin e Take 1 Cap by mouth daily. FLEET ENEMA RE Insert 1 Dose into rectum daily as needed (Constipation (if no BM, notify MD)). glimepiride 2 mg tablet Commonly known as:  AMARYL  
1 tablet at breakfast.  1 tablet at supper LANTUS U-100 INSULIN 100 unit/mL injection Generic drug:  insulin glargine 5 Units by SubCUTAneous route daily (with breakfast). lovastatin 20 mg tablet Commonly known as:  MEVACOR  
TAKE 1 TABLET DAILY  
  
 melatonin 3 mg tablet Take 3 mg by mouth nightly as needed. mirabegron ER 25 mg ER tablet Commonly known as:  MYRBETRIQ  
1 tablet daily   Dx:  Overactive bladder MIRALAX 17 gram packet Generic drug:  polyethylene glycol Take 17 g by mouth two (2) times daily as needed (Constipation). NovoLOG U-100 Insulin aspart 100 unit/mL injection Generic drug:  insulin aspart U-100  
by SubCUTAneous route. Inject subcutaneously per sliding scale before meals and at bedtime - -250mg/dL = 2 units, 251-300 mg/dL = 4 units, 301-350 mg/dL 6 units, 351-400 mg/dL = 8 units, 401-450 mg/dL = 10 units (call MD if < 70 or > 400 mg/dL)  Indications: sliding scale ONE-A-DAY WOMEN'S 50 PLUS PO Take 1 Tab by mouth daily. oxybutynin 5 mg tablet Commonly known as:  GAMWDDZL Take 1 Tab by mouth nightly. TRUEPLUS LANCETS 30 gauge Misc Generic drug:  lancets Use to check blood glucose up to twice daily DX:e11.9 TYLENOL 325 mg tablet Generic drug:  acetaminophen Take 650 mg by mouth every four (4) hours as needed (Pain/Temp (no more than 3 grams APAP/24 hours)). VISINE-A 0.025-0.3 % ophthalmic solution Generic drug:  naphazoline-pheniramine Administer 2 Drops to both eyes three (3) times daily as needed (Eye irritation). * Notice: This list has 2 medication(s) that are the same as other medications prescribed for you. Read the directions carefully, and ask your doctor or other care provider to review them with you. We Performed the Following AMB POC URINALYSIS DIP STICK AUTO W/O MICRO [15978 CPT(R)] Introducing [facility]! Paddy Ruano introduces [employer] patient portal. Now you can access parts of your medical record, email your doctor's office, and request medication refills online. 1. In your internet browser, go to [email or web address] 2. Click on the First Time User? Click Here link in the Sign In box. You will see the New Member Sign Up page. 3. Enter your [employer] Access Code exactly as it appears below. You will not need to use this code after youve completed the sign-up process. If you do not sign up before the expiration date, you must request a new code. · [employer] Access Code: [email or web address] Expires: 7/31/2018  2:21 PM 
 
4. Enter the last four digits of your Social Security Number (xxxx) and Date of Birth (mm/dd/yyyy) as indicated and click Submit. You will be taken to the next sign-up page. 5. Create a [employer] ID. This will be your [employer] login ID and cannot be changed, so think of one that is secure and easy to remember. 6. Create a [employer] password. You can change your password at any time. 7. Enter your Password Reset Question and Answer. This can be used at a later time if you forget your password. 8. Enter your e-mail address. You will receive e-mail notification when new information is available in [address] 9. Click Sign Up. You can now view and download portions of your medical record. 10. Click the Download Summary menu link to download a portable copy of your medical information. If you have questions, please visit the Frequently Asked Questions section of the [employer] website. Remember, [employer] is NOT to be used for urgent needs. For medical emergencies, dial 911. Now available from your iPhone and Android! Please provide this summary of care documentation to your next provider. Your primary care clinician is listed as [facility]. If you have any questions after today's visit, please call [phone].

## 2018-05-05 NOTE — PROGRESS NOTES
The patient presents to the office today with the chief complaint of type II diabetes    HPI    The patient remains on medications for type II diabetes mellitus. Her sugars are doing ok. The patient remains on long acting and sliding scale short acting insulin. She wants to stop the sliding scale. The patient wishes to stop the sliding scale indulin. The patient remains on medicaations for hypertension and hyperlipidemia. The patient is tolerating the medications well. The has been meications for thyroid disease in the past.      Review of Systems   Respiratory: Negative for shortness of breath. Cardiovascular: Negative for chest pain and leg swelling. Allergies   Allergen Reactions    Nitrofurantoin Nausea and Vomiting       Current Outpatient Prescriptions   Medication Sig Dispense Refill    aspirin delayed-release 81 mg tablet Take 81 mg by mouth daily.  bisacodyl (DULCOLAX, BISACODYL,) 5 mg EC tablet Take 5 mg by mouth two (2) times daily as needed for Constipation.  EASY TOUCH INSULIN SAFETY SYR 0.5 mL 30 gauge x 5/16\" syrg Use subcutaneously as directed with insulin.  mirabegron ER (MYRBETRIQ) 25 mg ER tablet 1 tablet daily   Dx:  Overactive bladder 30 Tab 2    MV-MN/FOLIC ACID/CALCIUM/VIT K (ONE-A-DAY WOMEN'S 50 PLUS PO) Take 1 Tab by mouth daily.  bisacodyl (DULCOLAX) 10 mg suppository Insert 10 mg into rectum daily. (Patient taking differently: Insert 10 mg into rectum daily as needed (Constipation). ) 30 Suppository 1    naphazoline-pheniramine (VISINE-A) 0.025-0.3 % ophthalmic solution Administer 2 Drops to both eyes three (3) times daily as needed (Eye irritation).  acetaminophen (TYLENOL) 325 mg tablet Take 650 mg by mouth every four (4) hours as needed (Pain/Temp (no more than 3 grams APAP/24 hours)).  insulin aspart (NOVOLOG) 100 unit/mL injection by SubCUTAneous route.  Inject subcutaneously per sliding scale before meals and at bedtime - BG 201-250mg/dL = 2 units, 251-300 mg/dL = 4 units, 301-350 mg/dL 6 units, 351-400 mg/dL = 8 units, 401-450 mg/dL = 10 units (call MD if < 70 or > 400 mg/dL)  Indications: sliding scale      SOD PHOS,M-B/NA PHOS,DI-BA (FLEET ENEMA RE) Insert 1 Dose into rectum daily as needed (Constipation (if no BM, notify MD)).  amLODIPine (NORVASC) 5 mg tablet Take 1 Tab by mouth daily. 30 Tab 0    insulin glargine (LANTUS) 100 unit/mL injection 5 Units by SubCUTAneous route daily (with breakfast).  polyethylene glycol (MIRALAX) 17 gram packet Take 17 g by mouth two (2) times daily as needed (Constipation).  TRUEPLUS LANCETS 30 gauge misc Use to check blood glucose up to twice daily DX:e11.9 300 Lancet 3    glimepiride (AMARYL) 2 mg tablet 1 tablet at breakfast.  1 tablet at supper 180 Tab 3    alendronate (FOSAMAX) 70 mg tablet TAKE 1 TABLET WEEKLY 12 Tab 3    lovastatin (MEVACOR) 20 mg tablet TAKE 1 TABLET DAILY 90 Tab 3    ASCENSIA CONTOUR strip USE TO TEST BLOOD SUGARS THREE TIMES DAILY (Patient taking differently: USE TO TEST BLOOD SUGARS THREE TIMES DAILY--embrace strips) 100 Strip 11    melatonin 3 mg tablet Take 3 mg by mouth nightly as needed.  omega-3 fatty acids-vitamin e (FISH OIL) 1,000 mg Cap Take 1 Cap by mouth daily.  ferrous sulfate (FEOSOL) 325 mg (65 mg iron) tablet Take 325 mg by mouth Daily (before breakfast).          Past Medical History:   Diagnosis Date    Abnormality of gait     Acute upper respiratory infections of unspecified site     Anemia     Broken shoulder     right    Chest pain     Chronic airway obstruction, not elsewhere classified     COPD     Diabetes ([address]     Essential hypertension, benign     Hearing loss     History of tachycardia     possible SVT    Hypercholesterolemia     Hyperlipidemia     Hypertension     Incontinence     Mitral valve prolapse     MVP (mitral valve prolapse)     OAB (overactive bladder)     Osteoarthritis     spine    Osteoporosis     Pure hypercholesterolemia     Senile osteoporosis     Thyroid disease     Thyrotoxicosis     Type II or unspecified type diabetes mellitus without mention of complication, not stated as uncontrolled     Urinary tract infection, site not specified        Past Surgical History:   Procedure Laterality Date    HX KNEE REPLACEMENT  left    HX OPEN REDUCTION INTERNAL FIXATION Left     patellar fracture    HX OTHER SURGICAL Right     shoulder surgery    REMV CATARACT EXTRACAP,INSERT LENS,COMP  [id number]    bilateral       Social History     Social History    Marital status:      Spouse name: N/A    Number of children: N/A    Years of education: N/A     Occupational History    Not on file. Social History Main Topics    Smoking status: Never Smoker    Smokeless tobacco: Never Used    Alcohol use No    Drug use: No    Sexual activity: No     Other Topics Concern    Not on file     Social History Narrative       Patient does have an advanced directive on file    Visit Vitals    /60 (BP 1 Location: Right arm, BP Patient Position: Sitting)    Pulse 75    Temp 97.8 °F (36.6 °C) (Tympanic)    Resp 20    Ht 5' 1\" (1.549 m)    Wt 100 lb (45.4 kg)    BMI 18.89 kg/m2       Physical Exam   Neck: Carotid bruit is not present. Cardiovascular: Normal rate and regular rhythm. Exam reveals no gallop. No murmur heard. Pulmonary/Chest: She has no wheezes. She has no rales. Abdominal: Soft. She exhibits no distension. There is no tenderness. Musculoskeletal: She exhibits no edema. No Cervical Lymphadenopathy  No Supraclavicular Lymphadenopathy  Thyroid is Normal  Lungs are normal to percussion. Clear to auscultation   Heart:  S1 S2 are normal, No gallops, No mummers  No Carotid Bruits  Abdomen:  Normal Bowel Sounds. No tenderness. No masses. No Hepatomegaly or Splenomegly  LE:  Strong Pedal Pulses.   No Edema      BMI:  [facility] Outpatient Visit on 05/02/2018 Component Date Value Ref Range Status    Special Requests: 05/02/2018 NO SPECIAL REQUESTS    Preliminary    Culture result: 05/02/2018 >100,000 COLONIES/mL POSSIBLE STREPTOCOCCI, ALPHA HEMOLYTIC*   Preliminary    Culture result: 05/02/2018 >100,000 COLONIES/mL POSSIBLE 2ND STREPTOCOCCI, ALPHA HEMOLYTIC*   Preliminary    WBC 05/02/2018 5.1  4.6 - 13.2 K/uL Final    RBC 05/02/2018 3.53* 4.20 - 5.30 M/uL Final    HGB 05/02/2018 12.4  12.0 - 16.0 g/dL Final    HCT 05/02/2018 34.1* 35.0 - 45.0 % Final    MCV 05/02/2018 96.6  74.0 - 97.0 FL Final    MCH 05/02/2018 35.1* 24.0 - 34.0 PG Final    MCHC 05/02/2018 36.4  31.0 - 37.0 g/dL Final    RDW 05/02/2018 13.6  11.6 - 14.5 % Final    PLATELET 51/35/7152 798  135 - 420 K/uL Final    MPV 05/02/2018 10.1  9.2 - 11.8 FL Final    NEUTROPHILS 05/02/2018 66  40 - 73 % Final    LYMPHOCYTES 05/02/2018 21  21 - 52 % Final    MONOCYTES 05/02/2018 12* 3 - 10 % Final    EOSINOPHILS 05/02/2018 1  0 - 5 % Final    BASOPHILS 05/02/2018 0  0 - 2 % Final    ABS. NEUTROPHILS 05/02/2018 3.4  1.8 - 8.0 K/UL Final    ABS. LYMPHOCYTES 05/02/2018 1.1  0.9 - 3.6 K/UL Final    ABS. MONOCYTES 05/02/2018 0.6  0.05 - 1.2 K/UL Final    ABS. EOSINOPHILS 05/02/2018 0.1  0.0 - 0.4 K/UL Final    ABS.  BASOPHILS 05/02/2018 0.0  0.0 - 0.06 K/UL Final    DF 05/02/2018 AUTOMATED    Final    Sodium 05/02/2018 138  136 - 145 mmol/L Final    Potassium 05/02/2018 4.2  3.5 - 5.5 mmol/L Final    Chloride 05/02/2018 101  100 - 108 mmol/L Final    CO2 05/02/2018 29  21 - 32 mmol/L Final    Anion gap 05/02/2018 8  3.0 - 18 mmol/L Final    Glucose 05/02/2018 64* 74 - 99 mg/dL Final    BUN 05/02/2018 35* 7.0 - 18 MG/DL Final    Creatinine 05/02/2018 1.01  0.6 - 1.3 MG/DL Final    BUN/Creatinine ratio 05/02/2018 35* 12 - 20   Final    GFR est AA 05/02/2018 >60  >60 ml/min/1.73m2 Final    GFR est non-AA 05/02/2018 51* >60 ml/min/1.73m2 Final    Comment: (NOTE)  Estimated GFR is calculated using the Modification of Diet in Renal   Disease (MDRD) Study equation, reported for both  Americans   (GFRAA) and non- Americans (GFRNA), and normalized to 1.73m2   body surface area. The physician must decide which value applies to   the patient. The MDRD study equation should only be used in   individuals age 25 or older. It has not been validated for the   following: pregnant women, patients with serious comorbid conditions,   or on certain medications, or persons with extremes of body size,   muscle mass, or nutritional status.  Calcium 05/02/2018 10.2* 8.5 - 10.1 MG/DL Final    Bilirubin, total 05/02/2018 0.3  0.2 - 1.0 MG/DL Final    ALT (SGPT) 05/02/2018 30  13 - 56 U/L Final    AST (SGOT) 05/02/2018 23  15 - 37 U/L Final    Alk. phosphatase 05/02/2018 44* 45 - 117 U/L Final    Protein, total 05/02/2018 7.6  6.4 - 8.2 g/dL Final    Albumin 05/02/2018 3.9  3.4 - 5.0 g/dL Final    Globulin 05/02/2018 3.7  2.0 - 4.0 g/dL Final    A-G Ratio 05/02/2018 1.1  0.8 - 1.7   Final    TSH 05/02/2018 0.49  0.36 - 3.74 uIU/mL Final    Hemoglobin A1c 05/02/2018 6.5* 4.2 - 5.6 % Final    Comment: (NOTE)  HbA1C Interpretive Ranges  <5.7              Normal  5.7 - 6.4         Consider Prediabetes  >6.5              Consider Diabetes      Est. average glucose 05/02/2018 140  mg/dL Final    Comment: (NOTE)  The eAG should be interpreted with patient characteristics in mind   since ethnicity, interindividual differences, red cell lifespan,   variation in rates of glycation, etc. may affect the validity of the   calculation.       T4, Free 05/02/2018 0.9  0.7 - 1.5 NG/DL Final    Triiodothyronine (T3), free 05/02/2018 3.2  2.18 - 3.98 PG/ML Final    LIPID PROFILE 05/02/2018        Final    Cholesterol, total 05/02/2018 183  <200 MG/DL Final    Triglyceride 05/02/2018 109  <150 MG/DL Final    Comment: The drugs N-acetylcysteine (NAC) and  Metamiszole have been found to cause falsely  low results in this chemical assay. Please  be sure to submit blood samples obtained  BEFORE administration of either of these  drugs to assure correct results.       HDL Cholesterol 05/02/2018 94* 40 - 60 MG/DL Final    LDL, calculated 05/02/2018 67.2  0 - 100 MG/DL Final    VLDL, calculated 05/02/2018 21.8  MG/DL Final    CHOL/HDL Ratio 05/02/2018 1.9  0 - 5.0   Final   Office Visit on 05/02/2018   Component Date Value Ref Range Status    Color (UA POC) 05/02/2018 Yellow   Final    Clarity (UA POC) 05/02/2018 Clear   Final    Glucose (UA POC) 05/02/2018 Negative  Negative Final    Bilirubin (UA POC) 05/02/2018 Negative  Negative Final    Ketones (UA POC) 05/02/2018 Negative  Negative Final    Specific gravity (UA POC) 05/02/2018 1.020  1.001 - 1.035 Final    Blood (UA POC) 05/02/2018 Trace  Negative Final    pH (UA POC) 05/02/2018 7.0  4.6 - 8.0 Final    Protein (UA POC) 05/02/2018 Negative  Negative Final    Urobilinogen (UA POC) 05/02/2018 0.2 mg/dL  0.2 - 1 Final    Nitrites (UA POC) 05/02/2018 Negative  Negative Final    Leukocyte esterase (UA POC) 05/02/2018 Negative  Negative Final   Office Visit on 04/02/2018   Component Date Value Ref Range Status    Color (UA POC) 04/02/2018 Yellow   Final    Clarity (UA POC) 04/02/2018 Clear   Final    Glucose (UA POC) 04/02/2018 Negative  Negative Final    Bilirubin (UA POC) 04/02/2018 Negative  Negative Final    Ketones (UA POC) 04/02/2018 Negative  Negative Final    Specific gravity (UA POC) 04/02/2018 1.010  1.001 - 1.035 Final    Blood (UA POC) 04/02/2018 Negative  Negative Final    pH (UA POC) 04/02/2018 6.5  4.6 - 8.0 Final    Protein (UA POC) 04/02/2018 Negative  Negative Final    Urobilinogen (UA POC) 04/02/2018 0.2 mg/dL  0.2 - 1 Final    Nitrites (UA POC) 04/02/2018 Negative  Negative Final    Leukocyte esterase (UA POC) 04/02/2018 Negative  Negative Final   Hospital Outpatient Visit on 02/22/2018   Component Date Value Ref Range Status    WBC 02/22/2018 5.1  4.6 - 13.2 K/uL Final    RBC 02/22/2018 3.33* 4.20 - 5.30 M/uL Final    HGB 02/22/2018 11.6* 12.0 - 16.0 g/dL Final    HCT 02/22/2018 31.7* 35.0 - 45.0 % Final    MCV 02/22/2018 95.2  74.0 - 97.0 FL Final    MCH 02/22/2018 34.8* 24.0 - 34.0 PG Final    MCHC 02/22/2018 36.6  31.0 - 37.0 g/dL Final    RDW 02/22/2018 13.6  11.6 - 14.5 % Final    PLATELET 56/90/2627 262  135 - 420 K/uL Final    MPV 02/22/2018 10.5  9.2 - 11.8 FL Final    NEUTROPHILS 02/22/2018 66  40 - 73 % Final    LYMPHOCYTES 02/22/2018 15* 21 - 52 % Final    MONOCYTES 02/22/2018 17* 3 - 10 % Final    EOSINOPHILS 02/22/2018 2  0 - 5 % Final    BASOPHILS 02/22/2018 0  0 - 2 % Final    ABS. NEUTROPHILS 02/22/2018 3.4  1.8 - 8.0 K/UL Final    ABS. LYMPHOCYTES 02/22/2018 0.8* 0.9 - 3.6 K/UL Final    ABS. MONOCYTES 02/22/2018 0.8  0.05 - 1.2 K/UL Final    ABS. EOSINOPHILS 02/22/2018 0.1  0.0 - 0.4 K/UL Final    ABS. BASOPHILS 02/22/2018 0.0  0.0 - 0.06 K/UL Final    DF 02/22/2018 AUTOMATED    Final    Sodium 02/22/2018 138  136 - 145 mmol/L Final    Potassium 02/22/2018 4.2  3.5 - 5.5 mmol/L Final    Chloride 02/22/2018 103  100 - 108 mmol/L Final    CO2 02/22/2018 27  21 - 32 mmol/L Final    Anion gap 02/22/2018 8  3.0 - 18 mmol/L Final    Glucose 02/22/2018 60* 74 - 99 mg/dL Final    BUN 02/22/2018 34* 7.0 - 18 MG/DL Final    Creatinine 02/22/2018 1.05  0.6 - 1.3 MG/DL Final    BUN/Creatinine ratio 02/22/2018 32* 12 - 20   Final    GFR est AA 02/22/2018 60* >60 ml/min/1.73m2 Final    GFR est non-AA 02/22/2018 49* >60 ml/min/1.73m2 Final    Comment: (NOTE)  Estimated GFR is calculated using the Modification of Diet in Renal   Disease (MDRD) Study equation, reported for both  Americans   (GFRAA) and non- Americans (GFRNA), and normalized to 1.73m2   body surface area. The physician must decide which value applies to   the patient.  The MDRD study equation should only be used in   individuals age 25 or older. It has not been validated for the   following: pregnant women, patients with serious comorbid conditions,   or on certain medications, or persons with extremes of body size,   muscle mass, or nutritional status.  Calcium 02/22/2018 9.5  8.5 - 10.1 MG/DL Final    Bilirubin, total 02/22/2018 0.2  0.2 - 1.0 MG/DL Final    ALT (SGPT) 02/22/2018 20  13 - 56 U/L Final    AST (SGOT) 02/22/2018 20  15 - 37 U/L Final    Alk. phosphatase 02/22/2018 40* 45 - 117 U/L Final    Protein, total 02/22/2018 7.1  6.4 - 8.2 g/dL Final    Albumin 02/22/2018 3.6  3.4 - 5.0 g/dL Final    Globulin 02/22/2018 3.5  2.0 - 4.0 g/dL Final    A-G Ratio 02/22/2018 1.0  0.8 - 1.7   Final    TSH 02/22/2018 0.53  0.36 - 3.74 uIU/mL Final       .  Results for orders placed or performed during the hospital encounter of 05/02/18   CULTURE, URINE   Result Value Ref Range    Special Requests: NO SPECIAL REQUESTS      Culture result: (A)       >100,000 COLONIES/mL POSSIBLE STREPTOCOCCI, ALPHA HEMOLYTIC    Culture result: (A)       >100,000 COLONIES/mL POSSIBLE 2ND STREPTOCOCCI, ALPHA HEMOLYTIC   CBC WITH AUTOMATED DIFF   Result Value Ref Range    WBC 5.1 4.6 - 13.2 K/uL    RBC 3.53 (L) 4.20 - 5.30 M/uL    HGB 12.4 12.0 - 16.0 g/dL    HCT 34.1 (L) 35.0 - 45.0 %    MCV 96.6 74.0 - 97.0 FL    MCH 35.1 (H) 24.0 - 34.0 PG    MCHC 36.4 31.0 - 37.0 g/dL    RDW 13.6 11.6 - 14.5 %    PLATELET 210 233 - 247 K/uL    MPV 10.1 9.2 - 11.8 FL    NEUTROPHILS 66 40 - 73 %    LYMPHOCYTES 21 21 - 52 %    MONOCYTES 12 (H) 3 - 10 %    EOSINOPHILS 1 0 - 5 %    BASOPHILS 0 0 - 2 %    ABS. NEUTROPHILS 3.4 1.8 - 8.0 K/UL    ABS. LYMPHOCYTES 1.1 0.9 - 3.6 K/UL    ABS. MONOCYTES 0.6 0.05 - 1.2 K/UL    ABS. EOSINOPHILS 0.1 0.0 - 0.4 K/UL    ABS.  BASOPHILS 0.0 0.0 - 0.06 K/UL    DF AUTOMATED     METABOLIC PANEL, COMPREHENSIVE   Result Value Ref Range    Sodium 138 136 - 145 mmol/L    Potassium 4.2 3.5 - 5.5 mmol/L    Chloride 101 100 - 108 mmol/L    CO2 29 21 - 32 mmol/L    Anion gap 8 3.0 - 18 mmol/L    Glucose 64 (L) 74 - 99 mg/dL    BUN 35 (H) 7.0 - 18 MG/DL    Creatinine 1.01 0.6 - 1.3 MG/DL    BUN/Creatinine ratio 35 (H) 12 - 20      GFR est AA >60 >60 ml/min/1.73m2    GFR est non-AA 51 (L) >60 ml/min/1.73m2    Calcium 10.2 (H) 8.5 - 10.1 MG/DL    Bilirubin, total 0.3 0.2 - 1.0 MG/DL    ALT (SGPT) 30 13 - 56 U/L    AST (SGOT) 23 15 - 37 U/L    Alk. phosphatase 44 (L) 45 - 117 U/L    Protein, total 7.6 6.4 - 8.2 g/dL    Albumin 3.9 3.4 - 5.0 g/dL    Globulin 3.7 2.0 - 4.0 g/dL    A-G Ratio 1.1 0.8 - 1.7     TSH 3RD GENERATION   Result Value Ref Range    TSH 0.49 0.36 - 3.74 uIU/mL   HEMOGLOBIN A1C WITH EAG   Result Value Ref Range    Hemoglobin A1c 6.5 (H) 4.2 - 5.6 %    Est. average glucose 140 mg/dL   T4, FREE   Result Value Ref Range    T4, Free 0.9 0.7 - 1.5 NG/DL   T3, FREE   Result Value Ref Range    Triiodothyronine (T3), free 3.2 2.18 - 3.98 PG/ML   LIPID PANEL   Result Value Ref Range    LIPID PROFILE          Cholesterol, total 183 <200 MG/DL    Triglyceride 109 <150 MG/DL    HDL Cholesterol 94 (H) 40 - 60 MG/DL    LDL, calculated 67.2 0 - 100 MG/DL    VLDL, calculated 21.8 MG/DL    CHOL/HDL Ratio 1.9 0 - 5.0     Results for orders placed or performed in visit on 05/02/18   AMB POC URINALYSIS DIP STICK AUTO W/O MICRO   Result Value Ref Range    Color (UA POC) Yellow     Clarity (UA POC) Clear     Glucose (UA POC) Negative Negative    Bilirubin (UA POC) Negative Negative    Ketones (UA POC) Negative Negative    Specific gravity (UA POC) 1.020 1.001 - 1.035    Blood (UA POC) Trace Negative    pH (UA POC) 7.0 4.6 - 8.0    Protein (UA POC) Negative Negative    Urobilinogen (UA POC) 0.2 mg/dL 0.2 - 1    Nitrites (UA POC) Negative Negative    Leukocyte esterase (UA POC) Negative Negative       Assessment / Plan      ICD-10-CM ICD-9-CM    1. Dysuria R30.0 788. 1 CULTURE, URINE      AMB POC URINALYSIS DIP STICK AUTO W/O MICRO      CBC WITH AUTOMATED DIFF      METABOLIC PANEL, COMPREHENSIVE      TSH 3RD GENERATION      HEMOGLOBIN A1C WITH EAG      T4, FREE      T3, FREE      LIPID PANEL   2. Controlled type 2 diabetes mellitus without complication, with long-term current use of insulin (HCC) E11.9 250.00 CULTURE, URINE    Z79.4 V58.67 AMB POC URINALYSIS DIP STICK AUTO W/O MICRO      CBC WITH AUTOMATED DIFF      METABOLIC PANEL, COMPREHENSIVE      TSH 3RD GENERATION      HEMOGLOBIN A1C WITH EAG      T4, FREE      T3, FREE      LIPID PANEL   3. Hypercholesterolemia E78.00 272.0 CULTURE, URINE      AMB POC URINALYSIS DIP STICK AUTO W/O MICRO      CBC WITH AUTOMATED DIFF      METABOLIC PANEL, COMPREHENSIVE      TSH 3RD GENERATION      HEMOGLOBIN A1C WITH EAG      T4, FREE      T3, FREE      LIPID PANEL   4. Essential hypertension I10 401.9 CULTURE, URINE      AMB POC URINALYSIS DIP STICK AUTO W/O MICRO      CBC WITH AUTOMATED DIFF      METABOLIC PANEL, COMPREHENSIVE      TSH 3RD GENERATION      HEMOGLOBIN A1C WITH EAG      T4, FREE      T3, FREE      LIPID PANEL   5. Hyperthyroidism E05.90 242.90 CULTURE, URINE      AMB POC URINALYSIS DIP STICK AUTO W/O MICRO      CBC WITH AUTOMATED DIFF      METABOLIC PANEL, COMPREHENSIVE      TSH 3RD GENERATION      HEMOGLOBIN A1C WITH EAG      T4, FREE      T3, FREE      LIPID PANEL       Labs ordered  Will stop sliding scale  she was advised to continue her maintenance medications      Follow-up Disposition:  Return in about 4 months (around 9/2/2018). I asked Saad Brewer if she has any questions and I answered the questions.   Saad Brewer states that she understands the treatment plan and agrees with the treatment plan

## 2018-05-06 LAB
BACTERIA SPEC CULT: ABNORMAL
SERVICE CMNT-IMP: ABNORMAL

## 2018-05-09 RX ORDER — AMOXICILLIN 500 MG/1
500 CAPSULE ORAL 3 TIMES DAILY
Qty: 30 CAP | Refills: 0 | Status: SHIPPED | OUTPATIENT
Start: 2018-05-09 | End: 2018-05-19

## 2018-05-14 ENCOUNTER — TELEPHONE (OUTPATIENT)
Dept: INTERNAL MEDICINE CLINIC | Age: 83
End: 2018-05-14

## 2018-05-14 NOTE — TELEPHONE ENCOUNTER
Patient stated the last time she saw Dr Loc Hogan he said he was going to get her a appointment for a Uro- gynecologist. Please call her with a status update on this #[phone]

## 2018-05-16 ENCOUNTER — TELEPHONE (OUTPATIENT)
Dept: INTERNAL MEDICINE CLINIC | Age: 83
End: 2018-05-16

## 2018-05-16 DIAGNOSIS — R32 URINARY INCONTINENCE, UNSPECIFIED TYPE: Primary | ICD-10-CM

## 2018-05-16 NOTE — TELEPHONE ENCOUNTER
Patient would like a referral   to go to Dr Taylor Terrell (Urogynecology)   Please call her when done [phone] (E)

## 2018-05-27 RX ORDER — MIRABEGRON 25 MG/1
TABLET, FILM COATED, EXTENDED RELEASE ORAL
Qty: 30 TAB | Refills: 0 | Status: SHIPPED | OUTPATIENT
Start: 2018-05-27 | End: 2018-07-17 | Stop reason: SDUPTHER

## 2018-05-29 ENCOUNTER — OFFICE VISIT (OUTPATIENT)
Dept: INTERNAL MEDICINE CLINIC | Age: 83
End: 2018-05-29

## 2018-05-29 ENCOUNTER — TELEPHONE (OUTPATIENT)
Dept: INTERNAL MEDICINE CLINIC | Age: 83
End: 2018-05-29

## 2018-05-29 ENCOUNTER — HOSPITAL ENCOUNTER (OUTPATIENT)
Dept: LAB | Age: 83
Discharge: HOME | End: 2018-05-29
Payer: MEDICARE

## 2018-05-29 DIAGNOSIS — R30.0 DYSURIA: Primary | ICD-10-CM

## 2018-05-29 DIAGNOSIS — R30.0 DYSURIA: ICD-10-CM

## 2018-05-29 LAB
BILIRUB UR QL STRIP: NEGATIVE
GLUCOSE UR-MCNC: NEGATIVE MG/DL
KETONES P FAST UR STRIP-MCNC: NEGATIVE MG/DL
PH UR STRIP: 6 [PH] (ref 4.6–8)
PROT UR QL STRIP: NEGATIVE
SP GR UR STRIP: 1.01 (ref 1–1.03)
UA UROBILINOGEN AMB POC: NORMAL (ref 0.2–1)
URINALYSIS CLARITY POC: CLEAR
URINALYSIS COLOR POC: YELLOW
URINE BLOOD POC: NORMAL
URINE LEUKOCYTES POC: NORMAL
URINE NITRITES POC: NEGATIVE

## 2018-05-29 PROCEDURE — 87186 SC STD MICRODIL/AGAR DIL: CPT | Performed by: INTERNAL MEDICINE

## 2018-05-29 PROCEDURE — 87086 URINE CULTURE/COLONY COUNT: CPT | Performed by: INTERNAL MEDICINE

## 2018-05-29 PROCEDURE — 87077 CULTURE AEROBIC IDENTIFY: CPT | Performed by: INTERNAL MEDICINE

## 2018-05-29 NOTE — PROGRESS NOTES
Urine sample brought to office by family per patient request. Per Dr Jose Ip verbal order poc urinalysis and culture performed, order read back and confirmed.

## 2018-05-29 NOTE — TELEPHONE ENCOUNTER
Patient states she has been off antibiotic for UTI for about a week and feels like she still has UTI. She is at [facility] and will have someone bring in a urine specimen to be cultured if necessary.

## 2018-05-31 LAB
BACTERIA SPEC CULT: ABNORMAL
SERVICE CMNT-IMP: ABNORMAL

## 2018-06-01 ENCOUNTER — TELEPHONE (OUTPATIENT)
Dept: INTERNAL MEDICINE CLINIC | Age: 83
End: 2018-06-01

## 2018-06-01 RX ORDER — CIPROFLOXACIN 250 MG/1
250 TABLET, FILM COATED ORAL EVERY 12 HOURS
Qty: 20 TAB | Refills: 0 | Status: SHIPPED | OUTPATIENT
Start: 2018-06-01 | End: 2018-06-11

## 2018-06-01 NOTE — TELEPHONE ENCOUNTER
Patient said's she is hurting when she goes to use the rest room  please send her something in thanks

## 2018-06-15 ENCOUNTER — TELEPHONE (OUTPATIENT)
Dept: INTERNAL MEDICINE CLINIC | Age: 83
End: 2018-06-15

## 2018-06-15 NOTE — TELEPHONE ENCOUNTER
patient would like to know if she is on a bladder pill.  Please call her she thought she was finish with that [phone]

## 2018-06-18 NOTE — TELEPHONE ENCOUNTER
Patient was called and advised that she was on a medication for her bladder and at that time she stated she did not want to take it and was going to a specialist

## 2018-06-19 DIAGNOSIS — Z79.4 CONTROLLED TYPE 2 DIABETES MELLITUS WITHOUT COMPLICATION, WITH LONG-TERM CURRENT USE OF INSULIN (HCC): ICD-10-CM

## 2018-06-19 DIAGNOSIS — E11.9 DIABETES MELLITUS TYPE 2, CONTROLLED (HCC): ICD-10-CM

## 2018-06-19 DIAGNOSIS — I10 ESSENTIAL HYPERTENSION: ICD-10-CM

## 2018-06-19 DIAGNOSIS — E05.00 THYROTOXICOSIS WITH DIFFUSE GOITER AND WITHOUT THYROID STORM: ICD-10-CM

## 2018-06-19 DIAGNOSIS — Z86.79 HX OF SUBDURAL HEMATOMA: ICD-10-CM

## 2018-06-19 DIAGNOSIS — E11.9 CONTROLLED TYPE 2 DIABETES MELLITUS WITHOUT COMPLICATION, WITH LONG-TERM CURRENT USE OF INSULIN (HCC): ICD-10-CM

## 2018-06-19 RX ORDER — BLOOD-GLUCOSE METER
EACH MISCELLANEOUS
Qty: 50 STRIP | Status: SHIPPED | OUTPATIENT
Start: 2018-06-19 | End: 2020-01-08

## 2018-06-20 RX ORDER — LOVASTATIN 20 MG/1
TABLET ORAL
Qty: 30 TAB | Refills: 0 | Status: SHIPPED | OUTPATIENT
Start: 2018-06-20 | End: 2021-01-01

## 2018-06-20 RX ORDER — ASPIRIN 81 MG/1
TABLET ORAL
Qty: 30 TAB | Refills: 0 | Status: SHIPPED | OUTPATIENT
Start: 2018-06-20 | End: 2021-01-01

## 2018-06-20 RX ORDER — GLIMEPIRIDE 2 MG/1
TABLET ORAL
Qty: 60 TAB | Refills: 0 | Status: SHIPPED | OUTPATIENT
Start: 2018-06-20 | End: 2019-03-23 | Stop reason: ALTCHOICE

## 2018-06-20 RX ORDER — AMLODIPINE BESYLATE 5 MG/1
TABLET ORAL
Qty: 30 TAB | Refills: 0 | Status: SHIPPED | OUTPATIENT
Start: 2018-06-20 | End: 2021-01-01

## 2018-07-02 ENCOUNTER — TELEPHONE (OUTPATIENT)
Dept: INTERNAL MEDICINE CLINIC | Age: 83
End: 2018-07-02

## 2018-07-02 NOTE — TELEPHONE ENCOUNTER
Patient returned call and stated she was taking 3 mg of melatonin and Dr Karin Brown advised she can take up to 10 mg of melatonin  Patient stated she will try that

## 2018-07-02 NOTE — TELEPHONE ENCOUNTER
Attempted to call patient on number provided unable to left message due to no voice mail      Will continue to call

## 2018-07-02 NOTE — TELEPHONE ENCOUNTER
Patient called because the Melatonin is not helping her sleep she would like to know if there was something stronger OTC she could take please let her know [phone].

## 2018-07-10 RX ORDER — INSULIN GLARGINE 100 [IU]/ML
INJECTION, SOLUTION SUBCUTANEOUS
Qty: 10 ML | Status: SHIPPED | OUTPATIENT
Start: 2018-07-10 | End: 2018-08-06 | Stop reason: DRUGHIGH

## 2018-07-13 RX ORDER — LANOLIN ALCOHOL/MO/W.PET/CERES
CREAM (GRAM) TOPICAL
Qty: 30 TAB | Status: SHIPPED | OUTPATIENT
Start: 2018-07-13 | End: 2020-01-08 | Stop reason: DRUGHIGH

## 2018-07-16 RX ORDER — SYRINGE,NEEDLE,INSULN,SF 0.5ML 29 G X1/2"
SYRINGE, EMPTY DISPOSABLE MISCELLANEOUS
Qty: 100 SYRINGE | Refills: 2 | Status: SHIPPED | OUTPATIENT
Start: 2018-07-16 | End: 2020-01-08

## 2018-07-17 RX ORDER — MIRABEGRON 25 MG/1
TABLET, FILM COATED, EXTENDED RELEASE ORAL
Qty: 30 TAB | Refills: 0 | Status: SHIPPED | OUTPATIENT
Start: 2018-07-17 | End: 2018-08-06 | Stop reason: ALTCHOICE

## 2018-08-06 ENCOUNTER — HOSPITAL ENCOUNTER (OUTPATIENT)
Dept: LAB | Age: 83
Discharge: HOME | End: 2018-08-06
Payer: MEDICARE

## 2018-08-06 ENCOUNTER — OFFICE VISIT (OUTPATIENT)
Dept: INTERNAL MEDICINE CLINIC | Age: 83
End: 2018-08-06

## 2018-08-06 VITALS
SYSTOLIC BLOOD PRESSURE: 130 MMHG | DIASTOLIC BLOOD PRESSURE: 78 MMHG | HEART RATE: 89 BPM | RESPIRATION RATE: 16 BRPM | HEIGHT: 61 IN | BODY MASS INDEX: 19.26 KG/M2 | TEMPERATURE: 98.4 F | WEIGHT: 102 LBS | OXYGEN SATURATION: 97 %

## 2018-08-06 DIAGNOSIS — E11.9 CONTROLLED TYPE 2 DIABETES MELLITUS WITHOUT COMPLICATION, WITH LONG-TERM CURRENT USE OF INSULIN (HCC): Primary | ICD-10-CM

## 2018-08-06 DIAGNOSIS — R32 URINARY INCONTINENCE, UNSPECIFIED TYPE: ICD-10-CM

## 2018-08-06 DIAGNOSIS — Z79.4 CONTROLLED TYPE 2 DIABETES MELLITUS WITHOUT COMPLICATION, WITH LONG-TERM CURRENT USE OF INSULIN (HCC): Primary | ICD-10-CM

## 2018-08-06 DIAGNOSIS — Z79.4 CONTROLLED TYPE 2 DIABETES MELLITUS WITHOUT COMPLICATION, WITH LONG-TERM CURRENT USE OF INSULIN (HCC): ICD-10-CM

## 2018-08-06 DIAGNOSIS — E11.9 CONTROLLED TYPE 2 DIABETES MELLITUS WITHOUT COMPLICATION, WITH LONG-TERM CURRENT USE OF INSULIN (HCC): ICD-10-CM

## 2018-08-06 DIAGNOSIS — E05.90 HYPERTHYROIDISM: ICD-10-CM

## 2018-08-06 LAB
BASOPHILS # BLD: 0 K/UL (ref 0–0.1)
BASOPHILS NFR BLD: 0 % (ref 0–2)
DIFFERENTIAL METHOD BLD: ABNORMAL
EOSINOPHIL # BLD: 0.1 K/UL (ref 0–0.4)
EOSINOPHIL NFR BLD: 1 % (ref 0–5)
ERYTHROCYTE [DISTWIDTH] IN BLOOD BY AUTOMATED COUNT: 13.7 % (ref 11.6–14.5)
HCT VFR BLD AUTO: 32.6 % (ref 35–45)
HGB BLD-MCNC: 11.8 G/DL (ref 12–16)
LYMPHOCYTES # BLD: 0.8 K/UL (ref 0.9–3.6)
LYMPHOCYTES NFR BLD: 15 % (ref 21–52)
MCH RBC QN AUTO: 33.8 PG (ref 24–34)
MCHC RBC AUTO-ENTMCNC: 36.2 G/DL (ref 31–37)
MCV RBC AUTO: 93.4 FL (ref 74–97)
MONOCYTES # BLD: 1 K/UL (ref 0.05–1.2)
MONOCYTES NFR BLD: 19 % (ref 3–10)
NEUTS SEG # BLD: 3.3 K/UL (ref 1.8–8)
NEUTS SEG NFR BLD: 65 % (ref 40–73)
PLATELET # BLD AUTO: 235 K/UL (ref 135–420)
PMV BLD AUTO: 10.5 FL (ref 9.2–11.8)
RBC # BLD AUTO: 3.49 M/UL (ref 4.2–5.3)
WBC # BLD AUTO: 5.1 K/UL (ref 4.6–13.2)

## 2018-08-06 PROCEDURE — 81001 URINALYSIS AUTO W/SCOPE: CPT | Performed by: INTERNAL MEDICINE

## 2018-08-06 PROCEDURE — 85025 COMPLETE CBC W/AUTO DIFF WBC: CPT | Performed by: INTERNAL MEDICINE

## 2018-08-06 PROCEDURE — 84443 ASSAY THYROID STIM HORMONE: CPT | Performed by: INTERNAL MEDICINE

## 2018-08-06 PROCEDURE — 83036 HEMOGLOBIN GLYCOSYLATED A1C: CPT | Performed by: INTERNAL MEDICINE

## 2018-08-06 PROCEDURE — 36415 COLL VENOUS BLD VENIPUNCTURE: CPT | Performed by: INTERNAL MEDICINE

## 2018-08-06 PROCEDURE — 80053 COMPREHEN METABOLIC PANEL: CPT | Performed by: INTERNAL MEDICINE

## 2018-08-06 PROCEDURE — 84439 ASSAY OF FREE THYROXINE: CPT | Performed by: INTERNAL MEDICINE

## 2018-08-06 RX ORDER — AMITRIPTYLINE HYDROCHLORIDE 10 MG/1
10 TABLET, FILM COATED ORAL
Qty: 90 TAB | Refills: 3 | Status: SHIPPED | OUTPATIENT
Start: 2018-08-06 | End: 2019-03-23 | Stop reason: ALTCHOICE

## 2018-08-06 RX ORDER — FACIAL-BODY WIPES
10 EACH TOPICAL
COMMUNITY
End: 2021-01-01

## 2018-08-06 RX ORDER — DOCUSATE SODIUM 100 MG/1
100 CAPSULE, LIQUID FILLED ORAL 2 TIMES DAILY
Qty: 60 CAP | Refills: 2 | Status: SHIPPED | OUTPATIENT
Start: 2018-08-06 | End: 2018-11-04

## 2018-08-06 RX ORDER — INSULIN GLARGINE 100 [IU]/ML
5 INJECTION, SOLUTION SUBCUTANEOUS DAILY
COMMUNITY
End: 2019-06-22 | Stop reason: ALTCHOICE

## 2018-08-06 NOTE — PROGRESS NOTES
No chief complaint on file. Depression Screening:  PHQ over the last two weeks 1/15/2018   Little interest or pleasure in doing things Not at all   Feeling down, depressed, irritable, or hopeless Not at all   Total Score PHQ 2 0       Learning Assessment:  Learning Assessment 1/19/2016   PRIMARY LEARNER Patient   HIGHEST LEVEL OF EDUCATION - PRIMARY LEARNER  GRADUATED HIGH SCHOOL OR GED   BARRIERS PRIMARY LEARNER NONE   CO-LEARNER CAREGIVER Yes   CO-LEARNER NAME [id number] [facility] LEVEL OF EDUCATION SOME COLLEGE   BARRIERS CO-LEARNER NONE   PRIMARY LANGUAGE ENGLISH   PRIMARY LANGUAGE CO-LEARNER ENGLISH    NEED No   LEARNER PREFERENCE PRIMARY PICTURES     DEMONSTRATION   LEARNER PREFERENCE CO-LEARNER DEMONSTRATION   ANSWERED BY patient   RELATIONSHIP SELF       Abuse Screening:  Abuse Screening Questionnaire 1/15/2018   Do you ever feel afraid of your partner? N   Are you in a relationship with someone who physically or mentally threatens you? N   Is it safe for you to go home? Y       Fall Risk  Fall Risk Assessment, last 12 mths 1/15/2018   Able to walk? Yes   Fall in past 12 months? Yes   Fall with injury? Yes   Number of falls in past 12 months 1   Fall Risk Score 2         Advance Directive:  1. Do you have an advance directive in place? Patient Reply:yes on file         1. Have you been to the ER, urgent care clinic since your last visit? Hospitalized since your last visit? No    2. Have you seen or consulted any other health care providers outside of the [address] [place] [address] since your last visit? Include any pap smears or colon screening.  No

## 2018-08-06 NOTE — PROGRESS NOTES
Pharmacy Note - Medication Therapy Management (MTM)  08/06/18         Subjective / Objective      Harrison Alexandra is a 80 y.o. female who was seen today for a comprehensive medication review (CMR) per OutcomesMTM. Patient was identified by her [place] as needing a comprehensive medication review. Patient is accompanied by her friend, Arti Crow, who drove her to the appointment. Patient resides at [facility]. YOB: 1926      Allergies   Allergen Reactions    Nitrofurantoin Nausea and Vomiting     Outpatient Medications Prior to Visit   Medication Sig Dispense Refill    EASY TOUCH INSULIN SAFETY SYR 0.5 mL 29 gauge x 1/2\" syrg USE AS DIRECTED 100 Syringe 2    melatonin 3 mg tablet TAKE 1 TABLET BY MOUTH AT BEDTIME AS NEEDED FOR SLEEP 30 Tab PRN    amLODIPine (NORVASC) 5 mg tablet TAKE 1 TABLET BY MOUTH ONCE DAILY FOR HTN 30 Tab 0    aspirin delayed-release 81 mg tablet TAKE 1 TABLET BY MOUTH ONCE DAILY FOR HEART HEALTH 30 Tab 0    lovastatin (MEVACOR) 20 mg tablet TAKE 1 TABLET BY MOUTH DAILY IN THE EVENING FOR HYPERLIPEDEMIA 30 Tab 0    glimepiride (AMARYL) 2 mg tablet TAKE 1 TABLET BY MOUTH TWICE DAILY FOR DM 60 Tab 0    EMBRACE BLOOD GLUCOSE SYSTEM strip USE AS DIRECTED TO CHECK BLOOD SUGAR BEFORE MEALS AND AT BEDTIME FOR UNCONTROLLABLE DM2 (E11.9) 50 Strip PRN    bisacodyl (DULCOLAX, BISACODYL,) 5 mg EC tablet Take 5 mg by mouth two (2) times daily as needed for Constipation.  EASY TOUCH INSULIN SAFETY SYR 0.5 mL 30 gauge x 5/16\" syrg Use subcutaneously as directed with insulin.  MV-MN/FOLIC ACID/CALCIUM/VIT K (ONE-A-DAY WOMEN'S 50 PLUS PO) Take 1 Tab by mouth daily.  acetaminophen (TYLENOL) 325 mg tablet Take 650 mg by mouth every four (4) hours as needed (Pain/Temp (no more than 3 grams APAP/24 hours)).  SOD PHOS,M-B/NA PHOS,DI-BA (FLEET ENEMA RE) Insert 1 Dose into rectum daily as needed (Constipation (if no BM, notify MD)).       polyethylene glycol (MIRALAX) 17 gram packet Take 17 g by mouth two (2) times daily as needed (Constipation).  TRUEPLUS LANCETS 30 gauge misc Use to check blood glucose up to twice daily DX:e11.9 300 Lancet 3    omega-3 fatty acids-vitamin e (FISH OIL) 1,000 mg Cap Take 1 Cap by mouth daily.  MYRBETRIQ 25 mg ER tablet TAKE 1 TABLET BY MOUTH ONCE DAILY FOR OVER ACTIVE BLADDER 30 Tab 0    LANTUS U-100 INSULIN 100 unit/mL injection INJECT S.C. 5 UNITS EVERY MORNING UPON WAKING FOR DM - CHECK SITE 10 mL PRN    bisacodyl (DULCOLAX) 10 mg suppository Insert 10 mg into rectum daily. (Patient taking differently: Insert 10 mg into rectum daily as needed (Constipation). ) 30 Suppository 1    naphazoline-pheniramine (VISINE-A) 0.025-0.3 % ophthalmic solution Administer 2 Drops to both eyes three (3) times daily as needed (Eye irritation).  insulin aspart (NOVOLOG) 100 unit/mL injection by SubCUTAneous route. Inject subcutaneously per sliding scale before meals and at bedtime - -250mg/dL = 2 units, 251-300 mg/dL = 4 units, 301-350 mg/dL 6 units, 351-400 mg/dL = 8 units, 401-450 mg/dL = 10 units (call MD if < 70 or > 400 mg/dL)  Indications: sliding scale      ferrous sulfate (FEOSOL) 325 mg (65 mg iron) tablet Take 325 mg by mouth Daily (before breakfast).  alendronate (FOSAMAX) 70 mg tablet TAKE 1 TABLET WEEKLY 12 Tab 3     No facility-administered medications prior to visit.         Medications Discontinued / Updated During Visit:   Medications Discontinued During This Encounter   Medication Reason    alendronate (FOSAMAX) 70 mg tablet Not A Current Medication    naphazoline-pheniramine (VISINE-A) 0.025-0.3 % ophthalmic solution Not A Current Medication    insulin aspart (NOVOLOG) 100 unit/mL injection Not A Current Medication    ferrous sulfate (FEOSOL) 325 mg (65 mg iron) tablet Not A Current Medication    bisacodyl (DULCOLAX) 10 mg suppository Dose Adjustment    peg 400-hypromellose-glycerin (VISINE DRY EYE RELIEF) 1-0.2-0.2 % drop opthalmic solution Formulary Change       Updated Medication List   Current Outpatient Prescriptions   Medication Sig    bisacodyl (DULCOLAX, BISACODYL,) 10 mg suppository Insert 10 mg into rectum daily as needed (constipation).  naphazoline-pheniramine (VISINE-A) 0.025-0.3 % ophthalmic solution Administer 2 Drops to both eyes three (3) times daily as needed (eye irritation).  docusate sodium (COLACE) 100 mg capsule Take 1 Cap by mouth two (2) times a day for 90 days.  amitriptyline (ELAVIL) 10 mg tablet Take 1 Tab by mouth nightly.  insulin glargine (LANTUS U-100 INSULIN) 100 unit/mL injection 5 Units by SubCUTAneous route daily.  EASY TOUCH INSULIN SAFETY SYR 0.5 mL 29 gauge x 1/2\" syrg USE AS DIRECTED    melatonin 3 mg tablet TAKE 1 TABLET BY MOUTH AT BEDTIME AS NEEDED FOR SLEEP    amLODIPine (NORVASC) 5 mg tablet TAKE 1 TABLET BY MOUTH ONCE DAILY FOR HTN    aspirin delayed-release 81 mg tablet TAKE 1 TABLET BY MOUTH ONCE DAILY FOR HEART HEALTH    lovastatin (MEVACOR) 20 mg tablet TAKE 1 TABLET BY MOUTH DAILY IN THE EVENING FOR HYPERLIPEDEMIA    glimepiride (AMARYL) 2 mg tablet TAKE 1 TABLET BY MOUTH TWICE DAILY FOR DM    EMBRACE BLOOD GLUCOSE SYSTEM strip USE AS DIRECTED TO CHECK BLOOD SUGAR BEFORE MEALS AND AT BEDTIME FOR UNCONTROLLABLE DM2 (E11.9)    bisacodyl (DULCOLAX, BISACODYL,) 5 mg EC tablet Take 5 mg by mouth two (2) times daily as needed for Constipation.  EASY TOUCH INSULIN SAFETY SYR 0.5 mL 30 gauge x 5/16\" syrg Use subcutaneously as directed with insulin.  MV-MN/FOLIC ACID/CALCIUM/VIT K (ONE-A-DAY WOMEN'S 50 PLUS PO) Take 1 Tab by mouth daily.  acetaminophen (TYLENOL) 325 mg tablet Take 650 mg by mouth every four (4) hours as needed (Pain/Temp (no more than 3 grams APAP/24 hours)).  SOD PHOS,M-B/NA PHOS,DI-BA (FLEET ENEMA RE) Insert 1 Dose into rectum daily as needed (Constipation (if no BM, notify MD)).     polyethylene glycol (MIRALAX) 17 gram packet Take 17 g by mouth two (2) times daily as needed (Constipation).  TRUEPLUS LANCETS 30 gauge misc Use to check blood glucose up to twice daily DX:e11.9    omega-3 fatty acids-vitamin e (FISH OIL) 1,000 mg Cap Take 1 Cap by mouth daily. No current facility-administered medications for this visit. Assessment / Plan      CMR: A thorough medication review was completed and medications were reconciled in both OutcomesMTM and in the patient's chart. Patient plans to discuss adding a stool softener with Dr. Raj Vo along with potentially changing her medications for sleep and overactive bladder. PCP aware and will address today. Patient was provided with a Take Away document which includes a medication list with indications and any necessary notes. Patient verbalized understanding of the information presented and all of the patients questions were answered. Patient was encouraged to call the office with any additional questions or concerns. Follow-up: Notifications of recommendations will be sent to Dr. Neel Bolivar MD for review.     Thank you,  Jonathan Damon, PHARMD, BCACP

## 2018-08-07 ENCOUNTER — TELEPHONE (OUTPATIENT)
Dept: INTERNAL MEDICINE CLINIC | Age: 83
End: 2018-08-07

## 2018-08-07 LAB
ALBUMIN SERPL-MCNC: 3.8 G/DL (ref 3.4–5)
ALBUMIN/GLOB SERPL: 1.1 {RATIO} (ref 0.8–1.7)
ALP SERPL-CCNC: 41 U/L (ref 45–117)
ALT SERPL-CCNC: 23 U/L (ref 13–56)
ANION GAP SERPL CALC-SCNC: 7 MMOL/L (ref 3–18)
APPEARANCE UR: CLEAR
AST SERPL-CCNC: 20 U/L (ref 15–37)
BILIRUB SERPL-MCNC: 0.2 MG/DL (ref 0.2–1)
BILIRUB UR QL: NEGATIVE
BUN SERPL-MCNC: 45 MG/DL (ref 7–18)
BUN/CREAT SERPL: 38 (ref 12–20)
CALCIUM SERPL-MCNC: 9.4 MG/DL (ref 8.5–10.1)
CHLORIDE SERPL-SCNC: 104 MMOL/L (ref 100–108)
CO2 SERPL-SCNC: 28 MMOL/L (ref 21–32)
COLOR UR: YELLOW
CREAT SERPL-MCNC: 1.18 MG/DL (ref 0.6–1.3)
EST. AVERAGE GLUCOSE BLD GHB EST-MCNC: 137 MG/DL
GLOBULIN SER CALC-MCNC: 3.4 G/DL (ref 2–4)
GLUCOSE SERPL-MCNC: 93 MG/DL (ref 74–99)
GLUCOSE UR STRIP.AUTO-MCNC: NEGATIVE MG/DL
HBA1C MFR BLD: 6.4 % (ref 4.2–5.6)
HGB UR QL STRIP: NEGATIVE
KETONES UR QL STRIP.AUTO: NEGATIVE MG/DL
LEUKOCYTE ESTERASE UR QL STRIP.AUTO: NEGATIVE
NITRITE UR QL STRIP.AUTO: NEGATIVE
PH UR STRIP: 6.5 [PH] (ref 5–8)
POTASSIUM SERPL-SCNC: 4.6 MMOL/L (ref 3.5–5.5)
PROT SERPL-MCNC: 7.2 G/DL (ref 6.4–8.2)
PROT UR STRIP-MCNC: NEGATIVE MG/DL
RBC #/AREA URNS HPF: NEGATIVE /HPF (ref 0–5)
SODIUM SERPL-SCNC: 139 MMOL/L (ref 136–145)
SP GR UR REFRACTOMETRY: 1.02 (ref 1–1.03)
T4 FREE SERPL-MCNC: 0.9 NG/DL (ref 0.7–1.5)
TSH SERPL DL<=0.05 MIU/L-ACNC: 0.57 UIU/ML (ref 0.36–3.74)
UROBILINOGEN UR QL STRIP.AUTO: 0.2 EU/DL (ref 0.2–1)
WBC URNS QL MICRO: NEGATIVE /HPF (ref 0–4)

## 2018-08-07 NOTE — TELEPHONE ENCOUNTER
Patient called and wanted to speak to [facility]. Her last visit she gave a list of concerns. She wanted to talk about these. She would like for a nurse to call her back and then [facility] to call on Monday. Please advise at [phone].

## 2018-08-08 NOTE — TELEPHONE ENCOUNTER
Spoke with patient and gave her the lab results per Dr Corina Irvin   Patient verbalized understanding

## 2018-08-13 ENCOUNTER — TELEPHONE (OUTPATIENT)
Dept: INTERNAL MEDICINE CLINIC | Age: 83
End: 2018-08-13

## 2018-08-13 NOTE — TELEPHONE ENCOUNTER
Patient called office last week and left message for pharmacist to call patient on Monday regarding \"list of concerns\" that she discussed with PCP at visit last Monday. Attempted to call patient's cell - automatic message reports \"cell phone customer is unavailable\". Unable to leave message. Will attempt to call patient back again later this week - pharmacist next in office on Thursday.     Bret Carlson, PharmD, BCACP

## 2018-08-13 NOTE — PROGRESS NOTES
The patient presents to the office today with the chief complaint of urinary frequency    HPI    The patient complains on urinary frequency with problems with urinary control. The patient also has nocturia which is a problem. This has been a long standing problem. The patient is on Myrbetriq with only slight improvement. The patient remains on insulin for type II diabetes mellitus. Her sugars are doing fairly well but now she is off sliding scale insulin. The patient has been on medications for hyperthyroidism but now she is off Tapazole. Review of Systems   Respiratory: Negative for shortness of breath. Cardiovascular: Negative for chest pain and leg swelling. Genitourinary: Positive for frequency. Allergies   Allergen Reactions    Nitrofurantoin Nausea and Vomiting       Current Outpatient Prescriptions   Medication Sig Dispense Refill    bisacodyl (DULCOLAX, BISACODYL,) 10 mg suppository Insert 10 mg into rectum daily as needed (constipation).  naphazoline-pheniramine (VISINE-A) 0.025-0.3 % ophthalmic solution Administer 2 Drops to both eyes three (3) times daily as needed (eye irritation).  docusate sodium (COLACE) 100 mg capsule Take 1 Cap by mouth two (2) times a day for 90 days. 60 Cap 2    amitriptyline (ELAVIL) 10 mg tablet Take 1 Tab by mouth nightly. 90 Tab 3    insulin glargine (LANTUS U-100 INSULIN) 100 unit/mL injection 5 Units by SubCUTAneous route daily.       mirabegron ER (MYRBETRIQ) 25 mg ER tablet 2 capsule daily (new directions) 60 Tab 3    EASY TOUCH INSULIN SAFETY SYR 0.5 mL 29 gauge x 1/2\" syrg USE AS DIRECTED 100 Syringe 2    melatonin 3 mg tablet TAKE 1 TABLET BY MOUTH AT BEDTIME AS NEEDED FOR SLEEP 30 Tab PRN    amLODIPine (NORVASC) 5 mg tablet TAKE 1 TABLET BY MOUTH ONCE DAILY FOR HTN 30 Tab 0    aspirin delayed-release 81 mg tablet TAKE 1 TABLET BY MOUTH ONCE DAILY FOR HEART HEALTH 30 Tab 0    lovastatin (MEVACOR) 20 mg tablet TAKE 1 TABLET BY MOUTH DAILY IN THE EVENING FOR HYPERLIPEDEMIA 30 Tab 0    glimepiride (AMARYL) 2 mg tablet TAKE 1 TABLET BY MOUTH TWICE DAILY FOR DM 60 Tab 0    EMBRACE BLOOD GLUCOSE SYSTEM strip USE AS DIRECTED TO CHECK BLOOD SUGAR BEFORE MEALS AND AT BEDTIME FOR UNCONTROLLABLE DM2 (E11.9) 50 Strip PRN    bisacodyl (DULCOLAX, BISACODYL,) 5 mg EC tablet Take 5 mg by mouth two (2) times daily as needed for Constipation.  EASY TOUCH INSULIN SAFETY SYR 0.5 mL 30 gauge x 5/16\" syrg Use subcutaneously as directed with insulin.  MV-MN/FOLIC ACID/CALCIUM/VIT K (ONE-A-DAY WOMEN'S 50 PLUS PO) Take 1 Tab by mouth daily.  acetaminophen (TYLENOL) 325 mg tablet Take 650 mg by mouth every four (4) hours as needed (Pain/Temp (no more than 3 grams APAP/24 hours)).  SOD PHOS,M-B/NA PHOS,DI-BA (FLEET ENEMA RE) Insert 1 Dose into rectum daily as needed (Constipation (if no BM, notify MD)).  polyethylene glycol (MIRALAX) 17 gram packet Take 17 g by mouth two (2) times daily as needed (Constipation).  TRUEPLUS LANCETS 30 gauge misc Use to check blood glucose up to twice daily DX:e11.9 300 Lancet 3    omega-3 fatty acids-vitamin e (FISH OIL) 1,000 mg Cap Take 1 Cap by mouth daily.          Past Medical History:   Diagnosis Date    Abnormality of gait     Acute upper respiratory infections of unspecified site     Anemia     Broken shoulder     right    Chest pain     Chronic airway obstruction, not elsewhere classified     COPD     Diabetes ([address]     Essential hypertension, benign     Hearing loss     History of tachycardia     possible SVT    Hypercholesterolemia     Hyperlipidemia     Hypertension     Incontinence     Mitral valve prolapse     MVP (mitral valve prolapse)     OAB (overactive bladder)     Osteoarthritis     spine    Osteoporosis     Pure hypercholesterolemia     Senile osteoporosis     Thyroid disease     Thyrotoxicosis     Type II or unspecified type diabetes mellitus without mention of complication, not stated as uncontrolled     Urinary tract infection, site not specified        Past Surgical History:   Procedure Laterality Date    HX KNEE REPLACEMENT  left    HX OPEN REDUCTION INTERNAL FIXATION Left     patellar fracture    HX OTHER SURGICAL Right     shoulder surgery    REMV CATARACT EXTRACAP,INSERT LENS,COMP  [id number]    bilateral       Social History     Social History    Marital status:      Spouse name: N/A    Number of children: N/A    Years of education: N/A     Occupational History    Not on file. Social History Main Topics    Smoking status: Never Smoker    Smokeless tobacco: Never Used    Alcohol use No    Drug use: No    Sexual activity: No     Other Topics Concern    Not on file     Social History Narrative       Patient does have an advanced directive on file    Visit Vitals    /78 (BP 1 Location: Left arm, BP Patient Position: Sitting)    Pulse 89    Temp 98.4 °F (36.9 °C) (Tympanic)    Resp 16    Ht 5' 1\" (1.549 m)    Wt 102 lb (46.3 kg)    SpO2 97%    BMI 19.27 kg/m2       Physical Exam   Neck: Carotid bruit is not present. Cardiovascular: Normal rate and regular rhythm. Exam reveals no gallop. No murmur heard. Pulmonary/Chest: She has no wheezes. She has no rales. Abdominal: Soft. She exhibits no distension. There is no tenderness. Musculoskeletal: She exhibits no edema.        BMI:  [facility] Outpatient Visit on 08/06/2018   Component Date Value Ref Range Status    WBC 08/06/2018 5.1  4.6 - 13.2 K/uL Final    RBC 08/06/2018 3.49* 4.20 - 5.30 M/uL Final    HGB 08/06/2018 11.8* 12.0 - 16.0 g/dL Final    HCT 08/06/2018 32.6* 35.0 - 45.0 % Final    MCV 08/06/2018 93.4  74.0 - 97.0 FL Final    MCH 08/06/2018 33.8  24.0 - 34.0 PG Final    MCHC 08/06/2018 36.2  31.0 - 37.0 g/dL Final    RDW 08/06/2018 13.7  11.6 - 14.5 % Final    PLATELET 80/26/4195 869  135 - 420 K/uL Final    MPV 08/06/2018 10.5  9.2 - 11.8 FL Final    NEUTROPHILS 08/06/2018 65  40 - 73 % Final    LYMPHOCYTES 08/06/2018 15* 21 - 52 % Final    MONOCYTES 08/06/2018 19* 3 - 10 % Final    EOSINOPHILS 08/06/2018 1  0 - 5 % Final    BASOPHILS 08/06/2018 0  0 - 2 % Final    ABS. NEUTROPHILS 08/06/2018 3.3  1.8 - 8.0 K/UL Final    ABS. LYMPHOCYTES 08/06/2018 0.8* 0.9 - 3.6 K/UL Final    ABS. MONOCYTES 08/06/2018 1.0  0.05 - 1.2 K/UL Final    ABS. EOSINOPHILS 08/06/2018 0.1  0.0 - 0.4 K/UL Final    ABS. BASOPHILS 08/06/2018 0.0  0.0 - 0.1 K/UL Final    DF 08/06/2018 AUTOMATED    Final    Sodium 08/06/2018 139  136 - 145 mmol/L Final    Potassium 08/06/2018 4.6  3.5 - 5.5 mmol/L Final    Chloride 08/06/2018 104  100 - 108 mmol/L Final    CO2 08/06/2018 28  21 - 32 mmol/L Final    Anion gap 08/06/2018 7  3.0 - 18 mmol/L Final    Glucose 08/06/2018 93  74 - 99 mg/dL Final    BUN 08/06/2018 45* 7.0 - 18 MG/DL Final    Creatinine 08/06/2018 1.18  0.6 - 1.3 MG/DL Final    BUN/Creatinine ratio 08/06/2018 38* 12 - 20   Final    GFR est AA 08/06/2018 52* >60 ml/min/1.73m2 Final    GFR est non-AA 08/06/2018 43* >60 ml/min/1.73m2 Final    Comment: (NOTE)  Estimated GFR is calculated using the Modification of Diet in Renal   Disease (MDRD) Study equation, reported for both  Americans   (GFRAA) and non- Americans (GFRNA), and normalized to 1.73m2   body surface area. The physician must decide which value applies to   the patient. The MDRD study equation should only be used in   individuals age 25 or older. It has not been validated for the   following: pregnant women, patients with serious comorbid conditions,   or on certain medications, or persons with extremes of body size,   muscle mass, or nutritional status.  Calcium 08/06/2018 9.4  8.5 - 10.1 MG/DL Final    Bilirubin, total 08/06/2018 0.2  0.2 - 1.0 MG/DL Final    ALT (SGPT) 08/06/2018 23  13 - 56 U/L Final    AST (SGOT) 08/06/2018 20  15 - 37 U/L Final    Alk.  phosphatase 08/06/2018 41* 45 - 117 U/L Final    Protein, total 08/06/2018 7.2  6.4 - 8.2 g/dL Final    Albumin 08/06/2018 3.8  3.4 - 5.0 g/dL Final    Globulin 08/06/2018 3.4  2.0 - 4.0 g/dL Final    A-G Ratio 08/06/2018 1.1  0.8 - 1.7   Final    Color 08/06/2018 YELLOW    Final    Appearance 08/06/2018 CLEAR    Final    Specific gravity 08/06/2018 1.016  1.005 - 1.030   Final    pH (UA) 08/06/2018 6.5  5.0 - 8.0   Final    Protein 08/06/2018 NEGATIVE   NEG mg/dL Final    Glucose 08/06/2018 NEGATIVE   NEG mg/dL Final    Ketone 08/06/2018 NEGATIVE   NEG mg/dL Final    Bilirubin 08/06/2018 NEGATIVE   NEG   Final    Blood 08/06/2018 NEGATIVE   NEG   Final    Urobilinogen 08/06/2018 0.2  0.2 - 1.0 EU/dL Final    Nitrites 08/06/2018 NEGATIVE   NEG   Final    Leukocyte Esterase 08/06/2018 NEGATIVE   NEG   Final    WBC 08/06/2018 NEGATIVE   0 - 4 /hpf Final    RBC 08/06/2018 NEGATIVE   0 - 5 /hpf Final    Hemoglobin A1c 08/06/2018 6.4* 4.2 - 5.6 % Final    Comment: (NOTE)  HbA1C Interpretive Ranges  <5.7              Normal  5.7 - 6.4         Consider Prediabetes  >6.5              Consider Diabetes      Est. average glucose 08/06/2018 137  mg/dL Final    Comment: (NOTE)  The eAG should be interpreted with patient characteristics in mind   since ethnicity, interindividual differences, red cell lifespan,   variation in rates of glycation, etc. may affect the validity of the   calculation.       TSH 08/06/2018 0.57  0.36 - 3.74 uIU/mL Final    T4, Free 08/06/2018 0.9  0.7 - 1.5 NG/DL Final   Hospital Outpatient Visit on 05/29/2018   Component Date Value Ref Range Status    Special Requests: 05/29/2018 NO SPECIAL REQUESTS    Final    Culture result: 05/29/2018 >100,000 COLONIES/mL ESCHERICHIA COLI*   Final   Office Visit on 05/29/2018   Component Date Value Ref Range Status    Color (UA POC) 05/29/2018 Yellow   Final    Clarity (UA POC) 05/29/2018 Clear   Final    Glucose (UA POC) 05/29/2018 Negative  Negative Final    Bilirubin (UA POC) 05/29/2018 Negative  Negative Final    Ketones (UA POC) 05/29/2018 Negative  Negative Final    Specific gravity (UA POC) 05/29/2018 1.010  1.001 - 1.035 Final    Blood (UA POC) 05/29/2018 Trace  Negative Final    pH (UA POC) 05/29/2018 6.0  4.6 - 8.0 Final    Protein (UA POC) 05/29/2018 Negative  Negative Final    Urobilinogen (UA POC) 05/29/2018 0.2 mg/dL  0.2 - 1 Final    Nitrites (UA POC) 05/29/2018 Negative  Negative Final    Leukocyte esterase (UA POC) 05/29/2018 3+  Negative Final       .  Results for orders placed or performed during the hospital encounter of 08/06/18   CBC WITH AUTOMATED DIFF   Result Value Ref Range    WBC 5.1 4.6 - 13.2 K/uL    RBC 3.49 (L) 4.20 - 5.30 M/uL    HGB 11.8 (L) 12.0 - 16.0 g/dL    HCT 32.6 (L) 35.0 - 45.0 %    MCV 93.4 74.0 - 97.0 FL    MCH 33.8 24.0 - 34.0 PG    MCHC 36.2 31.0 - 37.0 g/dL    RDW 13.7 11.6 - 14.5 %    PLATELET 390 948 - 658 K/uL    MPV 10.5 9.2 - 11.8 FL    NEUTROPHILS 65 40 - 73 %    LYMPHOCYTES 15 (L) 21 - 52 %    MONOCYTES 19 (H) 3 - 10 %    EOSINOPHILS 1 0 - 5 %    BASOPHILS 0 0 - 2 %    ABS. NEUTROPHILS 3.3 1.8 - 8.0 K/UL    ABS. LYMPHOCYTES 0.8 (L) 0.9 - 3.6 K/UL    ABS. MONOCYTES 1.0 0.05 - 1.2 K/UL    ABS. EOSINOPHILS 0.1 0.0 - 0.4 K/UL    ABS. BASOPHILS 0.0 0.0 - 0.1 K/UL    DF AUTOMATED     METABOLIC PANEL, COMPREHENSIVE   Result Value Ref Range    Sodium 139 136 - 145 mmol/L    Potassium 4.6 3.5 - 5.5 mmol/L    Chloride 104 100 - 108 mmol/L    CO2 28 21 - 32 mmol/L    Anion gap 7 3.0 - 18 mmol/L    Glucose 93 74 - 99 mg/dL    BUN 45 (H) 7.0 - 18 MG/DL    Creatinine 1.18 0.6 - 1.3 MG/DL    BUN/Creatinine ratio 38 (H) 12 - 20      GFR est AA 52 (L) >60 ml/min/1.73m2    GFR est non-AA 43 (L) >60 ml/min/1.73m2    Calcium 9.4 8.5 - 10.1 MG/DL    Bilirubin, total 0.2 0.2 - 1.0 MG/DL    ALT (SGPT) 23 13 - 56 U/L    AST (SGOT) 20 15 - 37 U/L    Alk.  phosphatase 41 (L) 45 - 117 U/L    Protein, total 7.2 6.4 - 8.2 g/dL Albumin 3.8 3.4 - 5.0 g/dL    Globulin 3.4 2.0 - 4.0 g/dL    A-G Ratio 1.1 0.8 - 1.7     URINALYSIS W/MICROSCOPIC   Result Value Ref Range    Color YELLOW      Appearance CLEAR      Specific gravity 1.016 1.005 - 1.030      pH (UA) 6.5 5.0 - 8.0      Protein NEGATIVE  NEG mg/dL    Glucose NEGATIVE  NEG mg/dL    Ketone NEGATIVE  NEG mg/dL    Bilirubin NEGATIVE  NEG      Blood NEGATIVE  NEG      Urobilinogen 0.2 0.2 - 1.0 EU/dL    Nitrites NEGATIVE  NEG      Leukocyte Esterase NEGATIVE  NEG      WBC NEGATIVE  0 - 4 /hpf    RBC NEGATIVE  0 - 5 /hpf   HEMOGLOBIN A1C WITH EAG   Result Value Ref Range    Hemoglobin A1c 6.4 (H) 4.2 - 5.6 %    Est. average glucose 137 mg/dL   TSH 3RD GENERATION   Result Value Ref Range    TSH 0.57 0.36 - 3.74 uIU/mL   T4, FREE   Result Value Ref Range    T4, Free 0.9 0.7 - 1.5 NG/DL       Assessment / Plan      ICD-10-CM ICD-9-CM    1. Controlled type 2 diabetes mellitus without complication, with long-term current use of insulin (HCC) E11.9 250.00 CBC WITH AUTOMATED DIFF    T30.7 W45.69 METABOLIC PANEL, COMPREHENSIVE      URINALYSIS W/MICROSCOPIC      HEMOGLOBIN A1C WITH EAG   2. Urinary incontinence, unspecified type R32 788.30 URINALYSIS W/MICROSCOPIC   3. Hyperthyroidism E05.90 242.90 CBC WITH AUTOMATED DIFF      TSH 3RD GENERATION      T4, FREE       Labs drawn  Increase Myrbetriq to 2 tab daily  she was advised to continue her maintenance medications      Follow-up Disposition:  Return in about 4 months (around 12/6/2018). I asked Tamiko Jw if she has any questions and I answered the questions.   Tamiko Jw states that she understands the treatment plan and agrees with the treatment plan

## 2018-08-16 ENCOUNTER — TELEPHONE (OUTPATIENT)
Dept: INTERNAL MEDICINE CLINIC | Age: 83
End: 2018-08-16

## 2018-08-16 NOTE — TELEPHONE ENCOUNTER
Patient called office again and left message for pharmacist to call patient today regarding \"list of concerns\" that she discussed with PCP at visit 10 days ago. Attempted to call patient's cell - automatic message reports \"wireless customer is unavailable\". Unable to leave message.   Will attempt to call patient back again next week - pharmacist next in office on Monday.     Jonathan Damon, PharmD, BCACP

## 2018-08-20 ENCOUNTER — TELEPHONE (OUTPATIENT)
Dept: INTERNAL MEDICINE CLINIC | Age: 83
End: 2018-08-20

## 2018-08-20 NOTE — TELEPHONE ENCOUNTER
Pharmacy Note - Medication Phone Follow-up per Patient Request  18         Subjective / Objective      Mario Marques is a 80 y.o. female who was contacted by phone (name/ verified) today at patient's request to discuss last visit with MD and patient's questions since then regarding new medication (amitriptyline) and [facility] MT service.     YOB: 1926    PCP: Dr. Josie Barillas    Past Medical History:   Diagnosis Date    Abnormality of gait     Acute upper respiratory infections of unspecified site     Anemia     Broken shoulder     right    Chest pain     Chronic airway obstruction, not elsewhere classified     COPD     Diabetes ([facility] [address]     Essential hypertension, benign     Hearing loss     History of tachycardia     possible SVT    Hypercholesterolemia     Hyperlipidemia     Hypertension     Incontinence     Mitral valve prolapse     MVP (mitral valve prolapse)     OAB (overactive bladder)     Osteoarthritis     spine    Osteoporosis     Pure hypercholesterolemia     Senile osteoporosis     Thyroid disease     Thyrotoxicosis     Type II or unspecified type diabetes mellitus without mention of complication, not stated as uncontrolled     Urinary tract infection, site not specified          Family History   Problem Relation Age of Onset    Diabetes Mother     Alcohol abuse Father     Arthritis-rheumatoid Sister     Diabetes Brother     Other Brother      eye problems    Hypertension Son     Diabetes Son     Cancer Son      Pancreatic cancer    Alcohol abuse Son        Social History     Social History    Marital status:      Spouse name: N/A    Number of children: N/A    Years of education: N/A     Social History Main Topics    Smoking status: Never Smoker    Smokeless tobacco: Never Used    Alcohol use No    Drug use: No    Sexual activity: No     Other Topics Concern    Not on file     Social History Narrative       Data reviewed:  Lab Results   Component Value Date/Time    Sodium 139 08/06/2018 03:25 PM    Potassium 4.6 08/06/2018 03:25 PM    Chloride 104 08/06/2018 03:25 PM    CO2 28 08/06/2018 03:25 PM    Anion gap 7 08/06/2018 03:25 PM    Glucose 93 08/06/2018 03:25 PM    BUN 45 (H) 08/06/2018 03:25 PM    Creatinine 1.18 08/06/2018 03:25 PM    BUN/Creatinine ratio 38 (H) 08/06/2018 03:25 PM    GFR est AA 52 (L) 08/06/2018 03:25 PM    GFR est non-AA 43 (L) 08/06/2018 03:25 PM    Calcium 9.4 08/06/2018 03:25 PM    Bilirubin, total 0.2 08/06/2018 03:25 PM    AST (SGOT) 20 08/06/2018 03:25 PM    Alk. phosphatase 41 (L) 08/06/2018 03:25 PM    Protein, total 7.2 08/06/2018 03:25 PM    Albumin 3.8 08/06/2018 03:25 PM    Globulin 3.4 08/06/2018 03:25 PM    A-G Ratio 1.1 08/06/2018 03:25 PM    ALT (SGPT) 23 08/06/2018 03:25 PM     Lab Results   Component Value Date/Time    Hemoglobin A1c 6.4 (H) 08/06/2018 03:25 PM    Hemoglobin A1c, External 8.3 07/30/2015     Lab Results   Component Value Date/Time    Cholesterol, total 183 05/02/2018 02:15 PM    HDL Cholesterol 94 (H) 05/02/2018 02:15 PM    LDL, calculated 67.2 05/02/2018 02:15 PM    VLDL, calculated 21.8 05/02/2018 02:15 PM    Triglyceride 109 05/02/2018 02:15 PM    CHOL/HDL Ratio 1.9 05/02/2018 02:15 PM       Allergies   Allergen Reactions    Nitrofurantoin Nausea and Vomiting       Current Medication List:   Outpatient Medications Prior to Visit   Medication Sig Dispense Refill    docusate sodium (COLACE) 100 mg capsule Take 1 Cap by mouth two (2) times a day for 90 days. 60 Cap 2    amitriptyline (ELAVIL) 10 mg tablet Take 1 Tab by mouth nightly.  90 Tab 3    EASY TOUCH INSULIN SAFETY SYR 0.5 mL 29 gauge x 1/2\" syrg USE AS DIRECTED 100 Syringe 2    melatonin 3 mg tablet TAKE 1 TABLET BY MOUTH AT BEDTIME AS NEEDED FOR SLEEP 30 Tab PRN    amLODIPine (NORVASC) 5 mg tablet TAKE 1 TABLET BY MOUTH ONCE DAILY FOR HTN 30 Tab 0    aspirin delayed-release 81 mg tablet TAKE 1 TABLET BY MOUTH ONCE DAILY FOR HEART HEALTH 30 Tab 0    lovastatin (MEVACOR) 20 mg tablet TAKE 1 TABLET BY MOUTH DAILY IN THE EVENING FOR HYPERLIPEDEMIA 30 Tab 0    glimepiride (AMARYL) 2 mg tablet TAKE 1 TABLET BY MOUTH TWICE DAILY FOR DM 60 Tab 0    EMBRACE BLOOD GLUCOSE SYSTEM strip USE AS DIRECTED TO CHECK BLOOD SUGAR BEFORE MEALS AND AT BEDTIME FOR UNCONTROLLABLE DM2 (E11.9) 50 Strip PRN    bisacodyl (DULCOLAX, BISACODYL,) 10 mg suppository Insert 10 mg into rectum daily as needed (constipation).  naphazoline-pheniramine (VISINE-A) 0.025-0.3 % ophthalmic solution Administer 2 Drops to both eyes three (3) times daily as needed (eye irritation).  insulin glargine (LANTUS U-100 INSULIN) 100 unit/mL injection 5 Units by SubCUTAneous route daily.  mirabegron ER (MYRBETRIQ) 25 mg ER tablet 2 capsule daily (new directions) 60 Tab 3    bisacodyl (DULCOLAX, BISACODYL,) 5 mg EC tablet Take 5 mg by mouth two (2) times daily as needed for Constipation.  EASY TOUCH INSULIN SAFETY SYR 0.5 mL 30 gauge x 5/16\" syrg Use subcutaneously as directed with insulin.  MV-MN/FOLIC ACID/CALCIUM/VIT K (ONE-A-DAY WOMEN'S 50 PLUS PO) Take 1 Tab by mouth daily.  acetaminophen (TYLENOL) 325 mg tablet Take 650 mg by mouth every four (4) hours as needed (Pain/Temp (no more than 3 grams APAP/24 hours)).  SOD PHOS,M-B/NA PHOS,DI-BA (FLEET ENEMA RE) Insert 1 Dose into rectum daily as needed (Constipation (if no BM, notifalden FOX)).  polyethylene glycol (MIRALAX) 17 gram packet Take 17 g by mouth two (2) times daily as needed (Constipation).  TRUEPLUS LANCETS 30 gauge misc Use to check blood glucose up to twice daily DX:e11.9 300 Lancet 3    omega-3 fatty acids-vitamin e (FISH OIL) 1,000 mg Cap Take 1 Cap by mouth daily. No facility-administered medications prior to visit. *Medication reconciliation not performed as meds are administered/managed by assisted living facility and not the patient.   Reconciliation was performed at last visit with assisted living facility [place]*    Medication Adherence/Cost:    [facility] [address] [facility] 2/3  [address] [facility] 2/3  [address]  [address]  Phone: [phone] Fax: [phone]      Assessment / Plan        A/P:   Medication questions/patient concerns:   · Advised patient of intent behind [facility] offering MTM services (comprehensive medication review) as a way to better educate patients about their medications and that the pharmacist is not taking over prescribing her medications. That remains Dr. Taylor Aguero responsibility. This is an annual service that [facility] determines her eligibility for and she is always welcome to opt out of. · Discussed patient's concerns with new medication - amitriptyline with regards to dosing, indication, possible adverse effects. Advised patient that PCP is trying to use it to help with sleep and overactive bladder and that she is on the lowest dose and that can be slowly titrated up for efficacy as long as she tolerates it. Advised patient to monitor OAB symptoms/insomnia frequency and call the office in the next 1-2 weeks if she feels it is not helping with OAB/sleep as that would be an adequate one-month trial.  · Patient believes blood sugars are higher than usual in the morning. Advised patient that recent labs indicate improved glycemic control but that if fasting AM sugars remain a concern for her, she needs to contact the office and Dr. Craig Melgar can determine if her insulin glargine needs to be adjusted (patient voiced not wanting to change anything currently). Discussed with PCP today - no changes indicated at this time. Patient verbalized understanding of the information discussed and all of the patients questions were answered. Patient was advised to call the office with any additional questions or concerns.     Follow-up: Notifications of recommendations will be sent to Dr. Ilda Lara MD for review.     Thank you for the consult,  Jonathan Damon, PHARMD, BCACP

## 2018-08-27 ENCOUNTER — TELEPHONE (OUTPATIENT)
Dept: INTERNAL MEDICINE CLINIC | Age: 83
End: 2018-08-27

## 2018-08-27 NOTE — TELEPHONE ENCOUNTER
Patient called and stated that she wants insulin in the morning and before she goes to bed. She stated that they are bruising her. I asked her when she got insulin now and she kept stating that she wanted it in the morning and before bed. Please advise.

## 2018-08-29 NOTE — TELEPHONE ENCOUNTER
I spoke to Dr Raj Vo regarding the pateint wanting to cut back on her insulin. Dr. Raj Vo stated that he doesn't want the patient to change anything as of now . Stay on what they had discussed no change. Patient was notified and stated that she will listen to Dr Raj Vo.

## 2018-09-04 ENCOUNTER — TELEPHONE (OUTPATIENT)
Dept: INTERNAL MEDICINE CLINIC | Age: 83
End: 2018-09-04

## 2018-09-04 NOTE — TELEPHONE ENCOUNTER
I called patient to see what was going on with her. Patient stated that the UTI or bladder infection she has had for a long time is not any better with the medication Merbetriq 25mg. A friend told her that it comes in different strengths and she may need another strength. I stated I would talk to Dr Nathalia Hoyos regarding this matter.

## 2018-09-07 ENCOUNTER — TELEPHONE (OUTPATIENT)
Dept: INTERNAL MEDICINE CLINIC | Age: 83
End: 2018-09-07

## 2018-09-07 NOTE — TELEPHONE ENCOUNTER
Patient called and wants to speak to Robert Yenifer regarding the Myrbetriq medication. She is wanting to know if she can take one in the morning and one at night. She had been taking them both at night. Please advise at [phone].

## 2018-09-07 NOTE — TELEPHONE ENCOUNTER
Was unable to leave message for patient on phone number    Per Dr Chavo Cagle he would like her to take the medication at dinner time

## 2018-09-28 ENCOUNTER — TELEPHONE (OUTPATIENT)
Dept: INTERNAL MEDICINE CLINIC | Age: 83
End: 2018-09-28

## 2018-09-28 NOTE — TELEPHONE ENCOUNTER
Patient called to see if she can start testing her sugar 4 times a day instead of twice daily. She feels like she could do better with her sugar if she does that. She wants to test before every meal and before bedtime.   Please advise

## 2018-10-09 ENCOUNTER — TELEPHONE (OUTPATIENT)
Dept: INTERNAL MEDICINE CLINIC | Age: 83
End: 2018-10-09

## 2018-10-09 DIAGNOSIS — R30.0 DYSURIA: Primary | ICD-10-CM

## 2018-10-09 NOTE — TELEPHONE ENCOUNTER
Patient is having the sypmtoms of a UTI she would [facility]

## 2018-10-09 NOTE — LETTER
10/09/18 To:  Susie RE:  Aria Thurston :  [id number] Please do a UA and Culture  for Aria Thurston Diagnosis: ICD-10-CM ICD-9-CM 1. Dysuria R30.0 788. 1 Thank you. Sincerely, Della Hutson M.D.   FACP

## 2018-10-22 RX ORDER — AMOXICILLIN 500 MG/1
500 CAPSULE ORAL 3 TIMES DAILY
Qty: 30 CAP | Refills: 0 | Status: SHIPPED | OUTPATIENT
Start: 2018-10-22 | End: 2018-11-01

## 2018-10-25 ENCOUNTER — TELEPHONE (OUTPATIENT)
Dept: INTERNAL MEDICINE CLINIC | Age: 83
End: 2018-10-25

## 2018-10-25 NOTE — TELEPHONE ENCOUNTER
Patient called and stated that she has congestion that is yellow that she can't seem to get up. She is worried because she has flu shot coming up first of November and doesn't know if this will interfere with that. She wants to know if Dr Amelia Douglas could send in something for her. Please advise at [phone].

## 2018-10-30 NOTE — TELEPHONE ENCOUNTER
Patient called and advised per Dr Juvenal Head, Patient is to take claritin 10mg daily    Patient verbalized understanding

## 2018-11-12 ENCOUNTER — CLINICAL SUPPORT (OUTPATIENT)
Dept: INTERNAL MEDICINE CLINIC | Age: 83
End: 2018-11-12

## 2018-11-12 DIAGNOSIS — Z23 ENCOUNTER FOR IMMUNIZATION: ICD-10-CM

## 2018-11-12 NOTE — PROGRESS NOTES
Patient presented to office for influenza vaccine  injection. Allergies noted. Patient well and consenting to injection. Injection given intramuscular in left deltoid. Patient tolerated injection well and left office ambulatory.

## 2018-11-30 ENCOUNTER — TELEPHONE (OUTPATIENT)
Dept: INTERNAL MEDICINE CLINIC | Age: 83
End: 2018-11-30

## 2018-11-30 NOTE — TELEPHONE ENCOUNTER
Patient is requesting a order be sent stating the patient can have 2X melatonin 3 mg  in her room to take two at night when she is ready

## 2018-12-11 NOTE — TELEPHONE ENCOUNTER
Patient was called and advised this nurse that the 3 mg is working  And she did not need anything at this time

## 2019-01-09 ENCOUNTER — TELEPHONE (OUTPATIENT)
Dept: INTERNAL MEDICINE CLINIC | Age: 84
End: 2019-01-09

## 2019-01-09 NOTE — TELEPHONE ENCOUNTER
Spoke to Dr. Loc Hogan regarding the patient wanting to take cranberry for her urin frequency and he stated that would be fine. I then called the patient and n?a and could not leave a message.

## 2019-01-09 NOTE — TELEPHONE ENCOUNTER
Patient called to see if Dr Ana Luisa Ruth thought cranberry pills would benefit her. She stated she has urinary frequency and wants to know if he thought these would help her. Please advise at [phone].

## 2019-03-20 ENCOUNTER — OFFICE VISIT (OUTPATIENT)
Dept: FAMILY MEDICINE CLINIC | Facility: CLINIC | Age: 84
End: 2019-03-20

## 2019-03-20 VITALS
OXYGEN SATURATION: 97 % | DIASTOLIC BLOOD PRESSURE: 84 MMHG | HEIGHT: 61 IN | SYSTOLIC BLOOD PRESSURE: 138 MMHG | WEIGHT: 102 LBS | RESPIRATION RATE: 16 BRPM | HEART RATE: 70 BPM | TEMPERATURE: 97.7 F | BODY MASS INDEX: 19.26 KG/M2

## 2019-03-20 DIAGNOSIS — R35.0 URINE FREQUENCY: Primary | ICD-10-CM

## 2019-03-20 DIAGNOSIS — M47.812 SPONDYLOSIS OF CERVICAL REGION WITHOUT MYELOPATHY OR RADICULOPATHY: ICD-10-CM

## 2019-03-20 DIAGNOSIS — E78.00 PURE HYPERCHOLESTEROLEMIA: ICD-10-CM

## 2019-03-20 DIAGNOSIS — Z79.4 CONTROLLED TYPE 2 DIABETES MELLITUS WITHOUT COMPLICATION, WITH LONG-TERM CURRENT USE OF INSULIN (HCC): ICD-10-CM

## 2019-03-20 DIAGNOSIS — E11.9 CONTROLLED TYPE 2 DIABETES MELLITUS WITHOUT COMPLICATION, WITH LONG-TERM CURRENT USE OF INSULIN (HCC): ICD-10-CM

## 2019-03-20 LAB
BILIRUB UR QL STRIP: NEGATIVE
GLUCOSE UR-MCNC: NEGATIVE MG/DL
KETONES P FAST UR STRIP-MCNC: NEGATIVE MG/DL
PH UR STRIP: 7 [PH] (ref 4.6–8)
PROT UR QL STRIP: NEGATIVE
SP GR UR STRIP: 1.01 (ref 1–1.03)
UA UROBILINOGEN AMB POC: NORMAL (ref 0.2–1)
URINALYSIS CLARITY POC: CLEAR
URINALYSIS COLOR POC: YELLOW
URINE BLOOD POC: NEGATIVE
URINE LEUKOCYTES POC: NEGATIVE
URINE NITRITES POC: NEGATIVE

## 2019-03-20 RX ORDER — NORTRIPTYLINE HYDROCHLORIDE 10 MG/1
CAPSULE ORAL
Qty: 30 CAP | Refills: 3 | Status: SHIPPED | OUTPATIENT
Start: 2019-03-20 | End: 2019-06-22 | Stop reason: ALTCHOICE

## 2019-03-20 NOTE — PROGRESS NOTES
Chief Complaint Patient presents with  Follow Up Chronic Condition HTN DM   
 Medication Refill Dulcolax  Urinary Frequency

## 2019-03-20 NOTE — LETTER
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 Sincerely, Ghassan Manuel M.D.   [facility]

## 2019-03-23 NOTE — PROGRESS NOTES
The patient presents to the office today with the chief complaint of urinary frequency HPI The patient complains of urinary frequency with nocturia. The patient is not sleeping well - partly due to nocturia. The patient patient remains on Insulin for type II diabetes mellitus. Her sugars have been doing ok. The patient complains of continued problems with pain and stiffness in her neck. The patient  has noted worsening problems with her memory. The patient is 3 years status post significant head trauma. Review of Systems Respiratory: Negative for shortness of breath. Cardiovascular: Negative for chest pain and leg swelling. Allergies Allergen Reactions  Nitrofurantoin Nausea and Vomiting Current Outpatient Medications Medication Sig Dispense Refill  nortriptyline (PAMELOR) 10 mg capsule 1 tab at bedtime 30 Cap 3  
 mirabegron ER (MYRBETRIQ) 50 mg ER tablet TAKE 1 TABLET BY MOUTH ONCE DAILY FOR OVERACTIVE BLADDER 30 Tab 0  
 bisacodyl (DULCOLAX, BISACODYL,) 10 mg suppository Insert 10 mg into rectum daily as needed (constipation).  naphazoline-pheniramine (VISINE-A) 0.025-0.3 % ophthalmic solution Administer 2 Drops to both eyes three (3) times daily as needed (eye irritation).  insulin glargine (LANTUS U-100 INSULIN) 100 unit/mL injection 5 Units by SubCUTAneous route daily.     
 EASY TOUCH INSULIN SAFETY SYR 0.5 mL 29 gauge x 1/2\" syrg USE AS DIRECTED 100 Syringe 2  
 melatonin 3 mg tablet TAKE 1 TABLET BY MOUTH AT BEDTIME AS NEEDED FOR SLEEP 30 Tab PRN  
 amLODIPine (NORVASC) 5 mg tablet TAKE 1 TABLET BY MOUTH ONCE DAILY FOR HTN 30 Tab 0  
 aspirin delayed-release 81 mg tablet TAKE 1 TABLET BY MOUTH ONCE DAILY FOR HEART HEALTH 30 Tab 0  
 lovastatin (MEVACOR) 20 mg tablet TAKE 1 TABLET BY MOUTH DAILY IN THE EVENING FOR HYPERLIPEDEMIA 30 Tab 0  
 EMBRACE BLOOD GLUCOSE SYSTEM strip USE AS DIRECTED TO CHECK BLOOD SUGAR BEFORE MEALS AND AT BEDTIME FOR UNCONTROLLABLE DM2 (E11.9) 50 Strip PRN  
 EASY TOUCH INSULIN SAFETY SYR 0.5 mL 30 gauge x 5/16\" syrg Use subcutaneously as directed with insulin.  MV-MN/FOLIC ACID/CALCIUM/VIT K (ONE-A-DAY WOMEN'S 50 PLUS PO) Take 1 Tab by mouth daily.  SOD PHOS,M-B/NA PHOS,DI-BA (FLEET ENEMA RE) Insert 1 Dose into rectum daily as needed (Constipation (if no BM, notify MD)).  polyethylene glycol (MIRALAX) 17 gram packet Take 17 g by mouth two (2) times daily as needed (Constipation).  TRUEPLUS LANCETS 30 gauge misc Use to check blood glucose up to twice daily DX:e11.9 300 Lancet 3  
 omega-3 fatty acids-vitamin e (FISH OIL) 1,000 mg Cap Take 1 Cap by mouth daily. Past Medical History:  
Diagnosis Date  Abnormality of gait  Acute upper respiratory infections of unspecified site  Anemia  Broken shoulder   
 right  Chest pain  Chronic airway obstruction, not elsewhere classified  COPD  Diabetes ([address]  Essential hypertension, benign  Hearing loss  History of tachycardia   
 possible SVT  Hypercholesterolemia  Hyperlipidemia  Hypertension  Incontinence  Mitral valve prolapse  MVP (mitral valve prolapse)  OAB (overactive bladder)  Osteoarthritis   
 spine  Osteoporosis  Pure hypercholesterolemia  Senile osteoporosis  Thyroid disease  Thyrotoxicosis  Type II or unspecified type diabetes mellitus without mention of complication, not stated as uncontrolled  Urinary tract infection, site not specified Past Surgical History:  
Procedure Laterality Date  HX KNEE REPLACEMENT  left  HX OPEN REDUCTION INTERNAL FIXATION Left   
 patellar fracture  HX OTHER SURGICAL Right   
 shoulder surgery  REMV CATARACT EXTRACAP,INSERT LENS,COMP  [id number]  
 bilateral  
 
 
Social History Socioeconomic History  Marital status:  Spouse name: Not on file  Number of children: Not on file  Years of education: Not on file  Highest education level: Not on file Occupational History  Not on file Social Needs  Financial resource strain: Not on file  Food insecurity:  
  Worry: Not on file Inability: Not on file  Transportation needs:  
  Medical: Not on file Non-medical: Not on file Tobacco Use  Smoking status: Never Smoker  Smokeless tobacco: Never Used Substance and Sexual Activity  Alcohol use: No  
 Drug use: No  
 Sexual activity: Never Lifestyle  Physical activity:  
  Days per week: Not on file Minutes per session: Not on file  Stress: Not on file Relationships  Social connections:  
  Talks on phone: Not on file Gets together: Not on file Attends Mandaen service: Not on file Active member of club or organization: Not on file Attends meetings of clubs or organizations: Not on file Relationship status: Not on file  Intimate partner violence:  
  Fear of current or ex partner: Not on file Emotionally abused: Not on file Physically abused: Not on file Forced sexual activity: Not on file Other Topics Concern  Not on file Social History Narrative  Not on file Patient does have an advanced directive on file Visit Vitals /84 Pulse 70 Temp 97.7 °F (36.5 °C) (Oral) Resp 16 Ht 5' 1\" (1.549 m) Wt 102 lb (46.3 kg) SpO2 97% BMI 19.27 kg/m² Physical Exam  
Neck: Carotid bruit is not present. Cardiovascular: Normal rate and regular rhythm. Exam reveals no gallop. No murmur heard. Pulmonary/Chest: She has no wheezes. She has no rales. Abdominal: Soft. She exhibits no distension. There is no tenderness. Musculoskeletal: She exhibits no edema. BMI:  OK Office Visit on 03/20/2019 Component Date Value Ref Range Status  Color (UA POC) 03/20/2019 Yellow   Final  
 Clarity (UA POC) 03/20/2019 Clear   Final  
  Glucose (UA POC) 03/20/2019 Negative  Negative Final  
 Bilirubin (UA POC) 03/20/2019 Negative  Negative Final  
 Ketones (UA POC) 03/20/2019 Negative  Negative Final  
 Specific gravity (UA POC) 03/20/2019 1.015  1.001 - 1.035 Final  
 Blood (UA POC) 03/20/2019 Negative  Negative Final  
 pH (UA POC) 03/20/2019 7.0  4.6 - 8.0 Final  
 Protein (UA POC) 03/20/2019 Negative  Negative Final  
 Urobilinogen (UA POC) 03/20/2019 0.2 mg/dL  0.2 - 1 Final  
 Nitrites (UA POC) 03/20/2019 Negative  Negative Final  
 Leukocyte esterase (UA POC) 03/20/2019 Negative  Negative Final  
 
 
. Results for orders placed or performed in visit on 03/20/19 AMB POC URINALYSIS DIP STICK AUTO W/O MICRO Result Value Ref Range Color (UA POC) Yellow Clarity (UA POC) Clear Glucose (UA POC) Negative Negative Bilirubin (UA POC) Negative Negative Ketones (UA POC) Negative Negative Specific gravity (UA POC) 1.015 1.001 - 1.035 Blood (UA POC) Negative Negative pH (UA POC) 7.0 4.6 - 8.0 Protein (UA POC) Negative Negative Urobilinogen (UA POC) 0.2 mg/dL 0.2 - 1 Nitrites (UA POC) Negative Negative Leukocyte esterase (UA POC) Negative Negative Assessment / Plan ICD-10-CM ICD-9-CM 1. Urine frequency R35.0 788.41 AMB POC URINALYSIS DIP STICK AUTO W/O MICRO  
   CBC WITH AUTOMATED DIFF  
   METABOLIC PANEL, COMPREHENSIVE  
   HEMOGLOBIN A1C WITH EAG 2. Controlled type 2 diabetes mellitus without complication, with long-term current use of insulin ([facility]) E11.9 250.00 CBC WITH AUTOMATED DIFF  
 P58.5 I70.30 METABOLIC PANEL, COMPREHENSIVE  
   HEMOGLOBIN A1C WITH EAG  
   CBC WITH AUTOMATED DIFF  
   METABOLIC PANEL, COMPREHENSIVE  
   HEMOGLOBIN A1C WITH EAG 3. Spondylosis of cervical region without myelopathy or radiculopathy M47.812 721.0 4. Pure hypercholesterolemia E78.00 272.0 CHOLESTEROL, TOTAL Labs ordered 
she was advised to continue her maintenance medications Further plans will be based on the lab results Follow-up and Dispositions · Return in about 4 months (around 7/20/2019). I asked Daiana Stallings if she has any questions and I answered the questions. Daiana Stallings states that she understands the treatment plan and agrees with the treatment plan

## 2019-03-27 ENCOUNTER — TELEPHONE (OUTPATIENT)
Dept: FAMILY MEDICINE CLINIC | Facility: CLINIC | Age: 84
End: 2019-03-27

## 2019-03-27 NOTE — TELEPHONE ENCOUNTER
Patient states Dr. Leatha Resendiz told her he would arrange for someone to draw her labs at [facility]. Please advise.

## 2019-04-18 ENCOUNTER — TELEPHONE (OUTPATIENT)
Dept: FAMILY MEDICINE CLINIC | Facility: CLINIC | Age: 84
End: 2019-04-18

## 2019-04-18 NOTE — TELEPHONE ENCOUNTER
Patient called stating Dr. Corina Irvin has increased her sleeping medication. Please call [facility] to update her record.

## 2019-04-25 ENCOUNTER — TELEPHONE (OUTPATIENT)
Dept: FAMILY MEDICINE CLINIC | Facility: CLINIC | Age: 84
End: 2019-04-25

## 2019-04-25 NOTE — TELEPHONE ENCOUNTER
[address] [facility]  Nurse  (Mariluz)call to inform Dr Hayley Curiel  that Ms Carito Childers had a fall and she did not hurt herself .

## 2019-05-23 RX ORDER — NORTRIPTYLINE HYDROCHLORIDE 10 MG/1
CAPSULE ORAL
Qty: 60 CAP | Refills: 0 | Status: SHIPPED | OUTPATIENT
Start: 2019-05-23 | End: 2020-01-08

## 2019-06-11 ENCOUNTER — TELEPHONE (OUTPATIENT)
Dept: FAMILY MEDICINE CLINIC | Facility: CLINIC | Age: 84
End: 2019-06-11

## 2019-06-12 NOTE — TELEPHONE ENCOUNTER
Patient called back in reference to eye drop.   She wants to know if she can take them three times a day

## 2019-06-19 ENCOUNTER — HOSPITAL ENCOUNTER (OUTPATIENT)
Dept: LAB | Age: 84
Discharge: HOME | End: 2019-06-19
Payer: MEDICARE

## 2019-06-19 ENCOUNTER — OFFICE VISIT (OUTPATIENT)
Dept: FAMILY MEDICINE CLINIC | Facility: CLINIC | Age: 84
End: 2019-06-19

## 2019-06-19 VITALS
TEMPERATURE: 98.4 F | HEART RATE: 88 BPM | BODY MASS INDEX: 19.63 KG/M2 | WEIGHT: 104 LBS | HEIGHT: 61 IN | DIASTOLIC BLOOD PRESSURE: 70 MMHG | OXYGEN SATURATION: 98 % | SYSTOLIC BLOOD PRESSURE: 120 MMHG

## 2019-06-19 DIAGNOSIS — E11.9 CONTROLLED TYPE 2 DIABETES MELLITUS WITHOUT COMPLICATION, WITH LONG-TERM CURRENT USE OF INSULIN (HCC): Primary | ICD-10-CM

## 2019-06-19 DIAGNOSIS — Z86.79 HX OF SUBDURAL HEMATOMA: ICD-10-CM

## 2019-06-19 DIAGNOSIS — R35.0 URINE FREQUENCY: ICD-10-CM

## 2019-06-19 DIAGNOSIS — Z79.4 CONTROLLED TYPE 2 DIABETES MELLITUS WITHOUT COMPLICATION, WITH LONG-TERM CURRENT USE OF INSULIN (HCC): Primary | ICD-10-CM

## 2019-06-19 LAB
ANION GAP SERPL CALC-SCNC: 8 MMOL/L (ref 3–18)
BUN SERPL-MCNC: 38 MG/DL (ref 7–18)
BUN/CREAT SERPL: 37 (ref 12–20)
CALCIUM SERPL-MCNC: 9.6 MG/DL (ref 8.5–10.1)
CHLORIDE SERPL-SCNC: 100 MMOL/L (ref 100–108)
CO2 SERPL-SCNC: 27 MMOL/L (ref 21–32)
CREAT SERPL-MCNC: 1.02 MG/DL (ref 0.6–1.3)
GLUCOSE SERPL-MCNC: 130 MG/DL (ref 74–99)
POTASSIUM SERPL-SCNC: 4.3 MMOL/L (ref 3.5–5.5)
SODIUM SERPL-SCNC: 135 MMOL/L (ref 136–145)

## 2019-06-19 PROCEDURE — 80048 BASIC METABOLIC PNL TOTAL CA: CPT

## 2019-06-19 RX ORDER — GLIMEPIRIDE 2 MG/1
2 TABLET ORAL
COMMUNITY
End: 2021-01-01

## 2019-06-19 NOTE — PROGRESS NOTES
Patient presents for lab draw ordered by: 
 
Ordering Provider:  Dr. Jerica Servin Ordering Department/Practice:  [facility] Phone:  [phone] Date Ordered:  6/19/19 The following labs were drawn and sent to [facility] by Heber Li LPN: 
 
TREVON The following tubes were sent: 
 
Gold  ( 1) 
 
R Antecubital cleansed with aseptic technique. Specimen obtained by using a 25 gauge butterfly needle with 1 attempt. Patient tolerated well and voiced no complaints.

## 2019-06-19 NOTE — PROGRESS NOTES
John Cisneros presents today for Chief Complaint Patient presents with  Well Woman John Cisneros preferred language for health care discussion is english. Is someone accompanying this pt? Yes Niece Is the patient using any DME equipment during OV? Yes wheelchair Depression Screening: 
3 most recent PHQ Screens 6/19/2019 Little interest or pleasure in doing things Not at all Feeling down, depressed, irritable, or hopeless Not at all Total Score PHQ 2 0 Learning Assessment: 
Learning Assessment 1/19/2016 PRIMARY LEARNER Patient HIGHEST LEVEL OF EDUCATION - PRIMARY LEARNER  GRADUATED HIGH SCHOOL OR GED  
BARRIERS PRIMARY LEARNER NONE  
CO-LEARNER CAREGIVER Yes CO-LEARNER NAME Stacey Eisenmenger R [address] SOME COLLEGE  
BARRIERS CO-LEARNER NONE PRIMARY LANGUAGE ENGLISH  
PRIMARY LANGUAGE CO-LEARNER ENGLISH  NEED No  
LEARNER PREFERENCE PRIMARY PICTURES  
  DEMONSTRATION  
LEARNER PREFERENCE CO-LEARNER DEMONSTRATION  
ANSWERED BY patient RELATIONSHIP SELF Abuse Screening: 
Abuse Screening Questionnaire 1/15/2018 Do you ever feel afraid of your partner? Genetta Croon Are you in a relationship with someone who physically or mentally threatens you? Genetta Croon Is it safe for you to go home? Jaleesa Skaggs Fall Risk Fall Risk Assessment, last 12 mths 6/19/2019 Able to walk? Yes Fall in past 12 months? No  
Fall with injury? -  
Number of falls in past 12 months - Fall Risk Score - Health Maintenance reviewed and discussed and ordered per Provider. Health Maintenance Due Topic Date Due  
 FOOT EXAM Q1  04/24/1936  Shingrix Vaccine Age 50> (1 of 2) 04/24/1976  Bone Densitometry (Dexa) Screening  04/24/1991  Pneumococcal 65+ years (1 of 2 - PCV13) 04/24/1991  MICROALBUMIN Q1  01/19/2017  MEDICARE YEARLY EXAM  03/14/2018  LIPID PANEL Q1  05/02/2019 Candance Huger Renell Auerbach is updated on all  
 
 Pt currently taking Antiplatelet therapy? no 
 
Coordination of Care: 1. Have you been to the ER, urgent care clinic since your last visit? no Hospitalized since your last visit? no 
 
2. Have you seen or consulted any other health care providers outside of the [address] [place] [address] since your last visit? no Include any pap smears or colon screening.  no

## 2019-06-22 NOTE — PROGRESS NOTES
Diabetes mellitus The patient presents to the office today with the chief complaint of diabetes mellitus HPI The patient has been on Lantus to 6 units daily and Amaryl for type 2 diabetes mellitus. Her sugars have been quite good and occasionally been towards the low side. The patient remains on Norvasc for hypertension. The patient is doing well on his medication. The patient also has urinary frequency consistent with an overactive bladder. The patient is on Myrbetriq. Amitriptyline was also added at bedtime. The patient found that her urinary frequency was better with amitriptyline but that she \"felt funny on the medicine. \"  The patient has a history of a subdural hematoma. She is doing better from that standpoint. ROS Negative for dizziness, chest pain, or dyspnea. Allergies Allergen Reactions  Nitrofurantoin Nausea and Vomiting Current Outpatient Medications Medication Sig Dispense Refill  glimepiride (AMARYL) 2 mg tablet Take 2 mg by mouth every morning.  nortriptyline (PAMELOR) 10 mg capsule TAKE 2 CAPSULES BY MOUTH DAILY AT BEDTIME FOR INSOMNIA 60 Cap 0  
 mirabegron ER (MYRBETRIQ) 50 mg ER tablet TAKE 1 TABLET BY MOUTH ONCE DAILY FOR OVERACTIVE BLADDER 30 Tab 0  
 bisacodyl (DULCOLAX, BISACODYL,) 10 mg suppository Insert 10 mg into rectum daily as needed (constipation).     
 EASY TOUCH INSULIN SAFETY SYR 0.5 mL 29 gauge x 1/2\" syrg USE AS DIRECTED 100 Syringe 2  
 melatonin 3 mg tablet TAKE 1 TABLET BY MOUTH AT BEDTIME AS NEEDED FOR SLEEP 30 Tab PRN  
 amLODIPine (NORVASC) 5 mg tablet TAKE 1 TABLET BY MOUTH ONCE DAILY FOR HTN 30 Tab 0  
 aspirin delayed-release 81 mg tablet TAKE 1 TABLET BY MOUTH ONCE DAILY FOR HEART HEALTH 30 Tab 0  
 lovastatin (MEVACOR) 20 mg tablet TAKE 1 TABLET BY MOUTH DAILY IN THE EVENING FOR HYPERLIPEDEMIA 30 Tab 0  
 EMBRACE BLOOD GLUCOSE SYSTEM strip USE AS DIRECTED TO CHECK BLOOD SUGAR BEFORE MEALS AND AT BEDTIME FOR UNCONTROLLABLE DM2 (E11.9) 50 Strip PRN  
 EASY TOUCH INSULIN SAFETY SYR 0.5 mL 30 gauge x 5/16\" syrg Use subcutaneously as directed with insulin.  SOD PHOS,M-B/NA PHOS,DI-BA (FLEET ENEMA RE) Insert 1 Dose into rectum daily as needed (Constipation (if no BM, notify MD)).  polyethylene glycol (MIRALAX) 17 gram packet Take 17 g by mouth two (2) times daily as needed (Constipation).  TRUEPLUS LANCETS 30 gauge misc Use to check blood glucose up to twice daily DX:e11.9 300 [address] 3 Past Medical History:  
Diagnosis Date  Abnormality of gait  Acute upper respiratory infections of unspecified site  Anemia  Broken shoulder   
 right  Chest pain  Chronic airway obstruction, not elsewhere classified  COPD  Diabetes ([facility] [address]  Essential hypertension, benign  Hearing loss  History of tachycardia   
 possible SVT  Hypercholesterolemia  Hyperlipidemia  Hypertension  Incontinence  Mitral valve prolapse  MVP (mitral valve prolapse)  OAB (overactive bladder)  Osteoarthritis   
 spine  Osteoporosis  Pure hypercholesterolemia  Senile osteoporosis  Thyroid disease  Thyrotoxicosis  Type II or unspecified type diabetes mellitus without mention of complication, not stated as uncontrolled  Urinary tract infection, site not specified Past Surgical History:  
Procedure Laterality Date  HX KNEE REPLACEMENT  left  HX OPEN REDUCTION INTERNAL FIXATION Left   
 patellar fracture  HX OTHER SURGICAL Right   
 shoulder surgery  REMV CATARACT EXTRACAP,INSERT LENS,COMP  [id number]  
 bilateral  
 
 
Social History Socioeconomic History  Marital status:  Spouse name: Not on file  Number of children: Not on file  Years of education: Not on file  Highest education level: Not on file Occupational History  Not on file Social Needs  Financial resource strain: Not on file  Food insecurity:  
  Worry: Not on file Inability: Not on file  Transportation needs:  
  Medical: Not on file Non-medical: Not on file Tobacco Use  Smoking status: Never Smoker  Smokeless tobacco: Never Used Substance and Sexual Activity  Alcohol use: No  
 Drug use: No  
 Sexual activity: Never Lifestyle  Physical activity:  
  Days per week: Not on file Minutes per session: Not on file  Stress: Not on file Relationships  Social connections:  
  Talks on phone: Not on file Gets together: Not on file Attends [employer] service: Not on file Active member of club or organization: Not on file Attends meetings of clubs or organizations: Not on file Relationship status: Not on file  Intimate partner violence:  
  Fear of current or ex partner: Not on file Emotionally abused: Not on file Physically abused: Not on file Forced sexual activity: Not on file Other Topics Concern  Not on file Social History Narrative  Not on file Patient does not have an advanced directive on file Visit Vitals /70 (BP 1 Location: Left arm, BP Patient Position: Sitting) Pulse 88 Temp 98.4 °F (36.9 °C) (Tympanic) Ht 5' 1\" (1.549 m) Wt 104 lb (47.2 kg) SpO2 98% BMI 19.65 kg/m² Physical Exam 
Lungs are clear to station percussion Cardiac exam: S1-S2 normal, no gallops, no murmurs Abdomen: Soft and nontender Lower extremity: No lower extremity edema BMI: [facility] Outpatient Visit on 06/19/2019 Component Date Value Ref Range Status  Sodium 06/19/2019 135* 136 - 145 mmol/L Final  
 Potassium 06/19/2019 4.3  3.5 - 5.5 mmol/L Final  
 Chloride 06/19/2019 100  100 - 108 mmol/L Final  
 CO2 06/19/2019 27  21 - 32 mmol/L Final  
 Anion gap 06/19/2019 8  3.0 - 18 mmol/L Final  
 Glucose 06/19/2019 130* 74 - 99 mg/dL Final  
 BUN 06/19/2019 38* 7.0 - 18 MG/DL Final  
  Creatinine 06/19/2019 1.02  0.6 - 1.3 MG/DL Final  
 BUN/Creatinine ratio 06/19/2019 37* 12 - 20   Final  
 GFR est AA 06/19/2019 >60  >60 ml/min/1.73m2 Final  
 GFR est non-AA 06/19/2019 51* >60 ml/min/1.73m2 Final  
 Comment: (NOTE) Estimated GFR is calculated using the Modification of Diet in Renal  
Disease (MDRD) Study equation, reported for both  [facility]) and non- Americans (GFRNA), and normalized to 1.73m2  
body surface area. The physician must decide which value applies to  
the patient. The MDRD study equation should only be used in  
individuals age 25 or older. It has not been validated for the  
following: pregnant women, patients with serious comorbid conditions,  
or on certain medications, or persons with extremes of body size,  
muscle mass, or nutritional status.  Calcium 06/19/2019 9.6  8.5 - 10.1 MG/DL Final  
Office Visit on 03/20/2019 Component Date Value Ref Range Status  Color (UA POC) 03/20/2019 Yellow   Final  
 Clarity (UA POC) 03/20/2019 Clear   Final  
 Glucose (UA POC) 03/20/2019 Negative  Negative Final  
 Bilirubin (UA POC) 03/20/2019 Negative  Negative Final  
 Ketones (UA POC) 03/20/2019 Negative  Negative Final  
 Specific gravity (UA POC) 03/20/2019 1.015  1.001 - 1.035 Final  
 Blood (UA POC) 03/20/2019 Negative  Negative Final  
 pH (UA POC) 03/20/2019 7.0  4.6 - 8.0 Final  
 Protein (UA POC) 03/20/2019 Negative  Negative Final  
 Urobilinogen (UA POC) 03/20/2019 0.2 mg/dL  0.2 - 1 Final  
 Nitrites (UA POC) 03/20/2019 Negative  Negative Final  
 Leukocyte esterase (UA POC) 03/20/2019 Negative  Negative Final  
 
 
. Results for orders placed or performed during the hospital encounter of 06/19/19 METABOLIC PANEL, BASIC Result Value Ref Range Sodium 135 (L) 136 - 145 mmol/L Potassium 4.3 3.5 - 5.5 mmol/L Chloride 100 100 - 108 mmol/L  
 CO2 27 21 - 32 mmol/L  Anion gap 8 3.0 - 18 mmol/L  
 Glucose 130 (H) 74 - 99 mg/dL BUN 38 (H) 7.0 - 18 MG/DL Creatinine 1.02 0.6 - 1.3 MG/DL  
 BUN/Creatinine ratio 37 (H) 12 - 20 GFR est AA >60 >60 ml/min/1.73m2 GFR est non-AA 51 (L) >60 ml/min/1.73m2 Calcium 9.6 8.5 - 10.1 MG/DL Assessment / Plan ICD-10-CM ICD-9-CM 1. Controlled type 2 diabetes mellitus without complication, with long-term current use of insulin ([facility]) L86.0 142.42 METABOLIC PANEL, BASIC  
 Y82.0 V71.33 METABOLIC PANEL, BASIC 2. Urine frequency R35.0 788.41   
3. Hx of subdural hematoma Z86.79 V12.59 We will discontinue Lantus and follow sugars 
she was advised to continue her other maintenance medications Follow-up and Dispositions · Return in about 4 months (around 10/19/2019). I asked Laurita Cano if she has any questions and I answered the questions. Laurita Cano states that she understands the treatment plan and agrees with the treatment plan THIS DOCUMENT WAS CREATED WITH A VOICE ACTIVATED DICTATION SYSTEM.   IT MAY CONTAIN TRANSCRIPTION ERRORS

## 2019-06-26 NOTE — TELEPHONE ENCOUNTER
Patient called back in reference to taking her eye drops 3 times a day instead of 2 times a day,  She also wants to know what her A1c resutls were.   Please call her at [phone]

## 2019-07-12 ENCOUNTER — TELEPHONE (OUTPATIENT)
Dept: FAMILY MEDICINE CLINIC | Facility: CLINIC | Age: 84
End: 2019-07-12

## 2019-07-12 NOTE — TELEPHONE ENCOUNTER
Patient called stating she wants to know what her A1C is. She also want Dr. Johana Kussmaul to approve eye drop 3 times a day. Please call [facility].

## 2019-07-24 ENCOUNTER — TELEPHONE (OUTPATIENT)
Dept: FAMILY MEDICINE CLINIC | Facility: CLINIC | Age: 84
End: 2019-07-24

## 2019-07-24 NOTE — TELEPHONE ENCOUNTER
Patient called asking for a 3rd eye drop to be used. And also wants to know what her A1C is. Please call [address] [facility] to give the information to her.

## 2019-08-19 ENCOUNTER — HOSPITAL ENCOUNTER (OUTPATIENT)
Dept: LAB | Age: 84
Discharge: HOME | End: 2019-08-19
Payer: MEDICARE

## 2019-08-19 ENCOUNTER — OFFICE VISIT (OUTPATIENT)
Dept: FAMILY MEDICINE CLINIC | Facility: CLINIC | Age: 84
End: 2019-08-19

## 2019-08-19 VITALS
HEART RATE: 80 BPM | SYSTOLIC BLOOD PRESSURE: 132 MMHG | BODY MASS INDEX: 19.52 KG/M2 | WEIGHT: 103.4 LBS | RESPIRATION RATE: 14 BRPM | TEMPERATURE: 99.1 F | HEIGHT: 61 IN | DIASTOLIC BLOOD PRESSURE: 78 MMHG | OXYGEN SATURATION: 95 %

## 2019-08-19 DIAGNOSIS — Z79.4 CONTROLLED TYPE 2 DIABETES MELLITUS WITHOUT COMPLICATION, WITH LONG-TERM CURRENT USE OF INSULIN (HCC): Primary | ICD-10-CM

## 2019-08-19 DIAGNOSIS — E11.9 CONTROLLED TYPE 2 DIABETES MELLITUS WITHOUT COMPLICATION, WITH LONG-TERM CURRENT USE OF INSULIN (HCC): Primary | ICD-10-CM

## 2019-08-19 DIAGNOSIS — Z79.4 CONTROLLED TYPE 2 DIABETES MELLITUS WITHOUT COMPLICATION, WITH LONG-TERM CURRENT USE OF INSULIN (HCC): ICD-10-CM

## 2019-08-19 DIAGNOSIS — Z86.79 HX OF SUBDURAL HEMATOMA: ICD-10-CM

## 2019-08-19 DIAGNOSIS — R35.1 NOCTURIA: ICD-10-CM

## 2019-08-19 DIAGNOSIS — E05.90 HYPERTHYROIDISM: ICD-10-CM

## 2019-08-19 DIAGNOSIS — R41.3 MEMORY LOSS: ICD-10-CM

## 2019-08-19 DIAGNOSIS — E11.9 CONTROLLED TYPE 2 DIABETES MELLITUS WITHOUT COMPLICATION, WITH LONG-TERM CURRENT USE OF INSULIN (HCC): ICD-10-CM

## 2019-08-19 LAB
ALBUMIN SERPL-MCNC: 3.7 G/DL (ref 3.4–5)
ALBUMIN/GLOB SERPL: 1.1 {RATIO} (ref 0.8–1.7)
ALP SERPL-CCNC: 48 U/L (ref 45–117)
ALT SERPL-CCNC: 22 U/L (ref 13–56)
ANION GAP SERPL CALC-SCNC: 8 MMOL/L (ref 3–18)
APPEARANCE UR: CLEAR
AST SERPL-CCNC: 21 U/L (ref 10–38)
BACTERIA URNS QL MICRO: ABNORMAL /HPF
BASOPHILS # BLD: 0 K/UL (ref 0–0.1)
BASOPHILS NFR BLD: 0 % (ref 0–2)
BILIRUB SERPL-MCNC: 0.3 MG/DL (ref 0.2–1)
BILIRUB UR QL: NEGATIVE
BUN SERPL-MCNC: 36 MG/DL (ref 7–18)
BUN/CREAT SERPL: 34 (ref 12–20)
CALCIUM SERPL-MCNC: 9 MG/DL (ref 8.5–10.1)
CHLORIDE SERPL-SCNC: 102 MMOL/L (ref 100–111)
CO2 SERPL-SCNC: 26 MMOL/L (ref 21–32)
COLOR UR: YELLOW
CREAT SERPL-MCNC: 1.06 MG/DL (ref 0.6–1.3)
DIFFERENTIAL METHOD BLD: ABNORMAL
EOSINOPHIL # BLD: 0 K/UL (ref 0–0.4)
EOSINOPHIL NFR BLD: 1 % (ref 0–5)
EPITH CASTS URNS QL MICRO: ABNORMAL /LPF (ref 0–5)
ERYTHROCYTE [DISTWIDTH] IN BLOOD BY AUTOMATED COUNT: 14.4 % (ref 11.6–14.5)
EST. AVERAGE GLUCOSE BLD GHB EST-MCNC: 148 MG/DL
GLOBULIN SER CALC-MCNC: 3.4 G/DL (ref 2–4)
GLUCOSE SERPL-MCNC: 136 MG/DL (ref 74–99)
GLUCOSE UR STRIP.AUTO-MCNC: NEGATIVE MG/DL
HBA1C MFR BLD: 6.8 % (ref 4.2–5.6)
HCT VFR BLD AUTO: 34.6 % (ref 35–45)
HGB BLD-MCNC: 12.2 G/DL (ref 12–16)
HGB UR QL STRIP: NEGATIVE
KETONES UR QL STRIP.AUTO: NEGATIVE MG/DL
LEUKOCYTE ESTERASE UR QL STRIP.AUTO: ABNORMAL
LYMPHOCYTES # BLD: 1.3 K/UL (ref 0.9–3.6)
LYMPHOCYTES NFR BLD: 23 % (ref 21–52)
MCH RBC QN AUTO: 33.2 PG (ref 24–34)
MCHC RBC AUTO-ENTMCNC: 35.3 G/DL (ref 31–37)
MCV RBC AUTO: 94 FL (ref 74–97)
MONOCYTES # BLD: 0.3 K/UL (ref 0.05–1.2)
MONOCYTES NFR BLD: 5 % (ref 3–10)
NEUTS SEG # BLD: 4 K/UL (ref 1.8–8)
NEUTS SEG NFR BLD: 71 % (ref 40–73)
NITRITE UR QL STRIP.AUTO: POSITIVE
PH UR STRIP: 6 [PH] (ref 5–8)
PLATELET # BLD AUTO: 253 K/UL (ref 135–420)
PMV BLD AUTO: 10.4 FL (ref 9.2–11.8)
POTASSIUM SERPL-SCNC: 4.3 MMOL/L (ref 3.5–5.5)
PROT SERPL-MCNC: 7.1 G/DL (ref 6.4–8.2)
PROT UR STRIP-MCNC: NEGATIVE MG/DL
RBC # BLD AUTO: 3.68 M/UL (ref 4.2–5.3)
RBC #/AREA URNS HPF: 0 /HPF (ref 0–5)
SODIUM SERPL-SCNC: 136 MMOL/L (ref 136–145)
SP GR UR REFRACTOMETRY: 1.01 (ref 1–1.03)
TSH SERPL DL<=0.05 MIU/L-ACNC: 0.35 UIU/ML (ref 0.36–3.74)
UROBILINOGEN UR QL STRIP.AUTO: 1 EU/DL (ref 0.2–1)
WBC # BLD AUTO: 5.6 K/UL (ref 4.6–13.2)
WBC URNS QL MICRO: ABNORMAL /HPF (ref 0–4)

## 2019-08-19 PROCEDURE — 84443 ASSAY THYROID STIM HORMONE: CPT

## 2019-08-19 PROCEDURE — 85025 COMPLETE CBC W/AUTO DIFF WBC: CPT

## 2019-08-19 PROCEDURE — 81001 URINALYSIS AUTO W/SCOPE: CPT

## 2019-08-19 PROCEDURE — 83036 HEMOGLOBIN GLYCOSYLATED A1C: CPT

## 2019-08-19 PROCEDURE — 80053 COMPREHEN METABOLIC PANEL: CPT

## 2019-08-19 PROCEDURE — 36415 COLL VENOUS BLD VENIPUNCTURE: CPT

## 2019-08-19 NOTE — PROGRESS NOTES
Brandy Shoulders presents today for   Chief Complaint   Patient presents with    Well Woman       Brandy Shoulders preferred language for health care discussion is english. Is someone accompanying this pt? Yes aid    Is the patient using any DME equipment during OV? no    Depression Screening:  3 most recent PHQ Screens 8/19/2019   Little interest or pleasure in doing things Not at all   Feeling down, depressed, irritable, or hopeless Not at all   Total Score PHQ 2 0       Learning Assessment:  Learning Assessment 1/19/2016   PRIMARY LEARNER Patient   HIGHEST LEVEL OF EDUCATION - PRIMARY LEARNER  GRADUATED HIGH SCHOOL OR GED   BARRIERS PRIMARY LEARNER [address] CAREGIVER Yes   CO-LEARNER NAME [id number] [employer] LEVEL OF EDUCATION SOME COLLEGE   BARRIERS CO-LEARNER NONE   PRIMARY LANGUAGE ENGLISH   PRIMARY LANGUAGE CO-LEARNER ENGLISH    NEED No   LEARNER PREFERENCE PRIMARY PICTURES     DEMONSTRATION   LEARNER PREFERENCE CO-LEARNER DEMONSTRATION   ANSWERED BY patient   RELATIONSHIP SELF       Abuse Screening:  Abuse Screening Questionnaire 1/15/2018   Do you ever feel afraid of your partner? N   Are you in a relationship with someone who physically or mentally threatens you? N   Is it safe for you to go home? Y       Fall Risk  Fall Risk Assessment, last 12 mths 8/19/2019   Able to walk? Yes   Fall in past 12 months? No   Fall with injury? -   Number of falls in past 12 months -   Fall Risk Score -       Health Maintenance reviewed and discussed and ordered per Provider. Health Maintenance Due   Topic Date Due    FOOT EXAM Q1  04/24/1936    Shingrix Vaccine Age 50> (1 of 2) 04/24/1976    Bone Densitometry (Dexa) Screening  04/24/1991    Pneumococcal 65+ years (1 of 2 - PCV13) 04/24/1991    MICROALBUMIN Q1  01/19/2017    MEDICARE YEARLY EXAM  03/14/2018    LIPID PANEL Q1  05/02/2019    Influenza Age 9 to Adult  08/01/2019   .       Susiea Shoulders is updated on all     Pt currently taking Antiplatelet therapy? no    Coordination of Care:  1. Have you been to the ER, urgent care clinic since your last visit? no Hospitalized since your last visit? no    2. Have you seen or consulted any other health care providers outside of the [address] [place] [address] since your last visit? no Include any pap smears or colon screening.  no

## 2019-08-20 NOTE — PROGRESS NOTES
The patient presents to the office today with the chief complaint of diabetes mellitus    HPI    The patient arrived from a [facility] today for scheduled office appointment. The patient does not know her medication list.  The patient did not bring a copy of medication list from the [facility] with her. A call was made to make 4 hours to obtain a copy of her medication list.  This had not been returned by the time that this note was completed. We are unable to reconcile the patient's medications. The patient has been on Amaryl for type 2 diabetes mellitus. The patient also had been on insulin but this had been stopped several months ago. The patient's hemoglobin A1c on April 1 was 6.3. The patient has been on medications for hypothyroidism the past.  We do not have any documentation the patient currently receiving Tapazole or PTU. The patient's TSH level first was 0.42. The patient continues to complain of nocturia associated with urinary urgency. The patient is listed in the past as being on amitriptyline and Myrbetriq with little success. Patient has a history of a subdural hematoma. This was successfully evacuated several years ago. The patient complains of a poor memory. ROS  Positive for memory loss and nocturia as above  Negative for chest pain, shortness of breath, or lower extremity swelling    Allergies   Allergen Reactions    Nitrofurantoin Nausea and Vomiting       Current Outpatient Medications   Medication Sig Dispense Refill    mv,Ca,min-folic acid-vit K1 (ONE-A-DAY WOMEN'S 50 PLUS) 400-20 mcg tab Take 1 Tab by mouth daily. 30 Tab 5    glimepiride (AMARYL) 2 mg tablet Take 2 mg by mouth every morning.       nortriptyline (PAMELOR) 10 mg capsule TAKE 2 CAPSULES BY MOUTH DAILY AT BEDTIME FOR INSOMNIA 60 Cap 0    mirabegron ER (MYRBETRIQ) 50 mg ER tablet TAKE 1 TABLET BY MOUTH ONCE DAILY FOR OVERACTIVE BLADDER 30 Tab 0    bisacodyl (DULCOLAX, BISACODYL,) 10 mg suppository Insert 10 mg into rectum daily as needed (constipation).  EASY TOUCH INSULIN SAFETY SYR 0.5 mL 29 gauge x 1/2\" syrg USE AS DIRECTED 100 Syringe 2    melatonin 3 mg tablet TAKE 1 TABLET BY MOUTH AT BEDTIME AS NEEDED FOR SLEEP 30 Tab PRN    amLODIPine (NORVASC) 5 mg tablet TAKE 1 TABLET BY MOUTH ONCE DAILY FOR HTN 30 Tab 0    aspirin delayed-release 81 mg tablet TAKE 1 TABLET BY MOUTH ONCE DAILY FOR HEART HEALTH 30 Tab 0    lovastatin (MEVACOR) 20 mg tablet TAKE 1 TABLET BY MOUTH DAILY IN THE EVENING FOR HYPERLIPEDEMIA 30 Tab 0    EMBRACE BLOOD GLUCOSE SYSTEM strip USE AS DIRECTED TO CHECK BLOOD SUGAR BEFORE MEALS AND AT BEDTIME FOR UNCONTROLLABLE DM2 (E11.9) 50 Strip PRN    EASY TOUCH INSULIN SAFETY SYR 0.5 mL 30 gauge x 5/16\" syrg Use subcutaneously as directed with insulin.  SOD PHOS,M-B/NA PHOS,DI-BA (FLEET ENEMA RE) Insert 1 Dose into rectum daily as needed (Constipation (if no BM, notify MD)).  polyethylene glycol (MIRALAX) 17 gram packet Take 17 g by mouth two (2) times daily as needed (Constipation).       TRUEPLUS LANCETS 30 gauge misc Use to check blood glucose up to twice daily DX:e11.9 300 Lancet 3       Past Medical History:   Diagnosis Date    Abnormality of gait     Acute upper respiratory infections of unspecified site     Anemia     Broken shoulder     right    Chest pain     Chronic airway obstruction, not elsewhere classified     COPD     Diabetes ([facility] [address]     Essential hypertension, benign     Hearing loss     History of tachycardia     possible SVT    Hypercholesterolemia     Hyperlipidemia     Hypertension     Incontinence     Mitral valve prolapse     MVP (mitral valve prolapse)     OAB (overactive bladder)     Osteoarthritis     spine    Osteoporosis     Pure hypercholesterolemia     Senile osteoporosis     Thyroid disease     Thyrotoxicosis     Type II or unspecified type diabetes mellitus without mention of complication, not stated as uncontrolled  Urinary tract infection, site not specified        Past Surgical History:   Procedure Laterality Date    HX KNEE REPLACEMENT  left    HX OPEN REDUCTION INTERNAL FIXATION Left     patellar fracture    HX OTHER SURGICAL Right     shoulder surgery    REMV CATARACT EXTRACAP,INSERT LENS,COMP  [id number]    bilateral       Social History     Socioeconomic History    Marital status:      Spouse name: Not on file    Number of children: Not on file    Years of education: Not on file    Highest education level: Not on file   Occupational History    Not on file   Social Needs    Financial resource strain: Not on file    Food insecurity:     Worry: Not on file     Inability: Not on file    Transportation needs:     Medical: Not on file     Non-medical: Not on file   Tobacco Use    Smoking status: Never Smoker    Smokeless tobacco: Never Used   Substance and Sexual Activity    Alcohol use: No    Drug use: No    Sexual activity: Never   Lifestyle    Physical activity:     Days per week: Not on file     Minutes per session: Not on file    Stress: Not on file   Relationships    Social connections:     Talks on phone: Not on file     Gets together: Not on file     Attends Caodaism service: Not on file     Active member of club or organization: Not on file     Attends meetings of clubs or organizations: Not on file     Relationship status: Not on file    Intimate partner violence:     Fear of current or ex partner: Not on file     Emotionally abused: Not on file     Physically abused: Not on file     Forced sexual activity: Not on file   Other Topics Concern    Not on file   Social History Narrative    Not on file       Patient does have an advanced directive on file    Visit Vitals  /78 (BP 1 Location: Left arm, BP Patient Position: Sitting)   Pulse 80   Temp 99.1 °F (37.3 °C) (Tympanic)   Resp 14   Ht 5' 1\" (1.549 m)   Wt 103 lb 6.4 oz (46.9 kg)   SpO2 95%   BMI 19.54 kg/m²       Physical Exam  The patient is awake and alert  Lungs are clear to station percussion  Cardiac exam: S1-S2 normal, no gallops, no murmurs  No carotid bruits  Abdomen is soft and nontender  No lower extremity edema    BMI: [facility] Outpatient Visit on 08/19/2019   Component Date Value Ref Range Status    WBC 08/19/2019 5.6  4.6 - 13.2 K/uL Final    RBC 08/19/2019 3.68* 4.20 - 5.30 M/uL Final    HGB 08/19/2019 12.2  12.0 - 16.0 g/dL Final    HCT 08/19/2019 34.6* 35.0 - 45.0 % Final    MCV 08/19/2019 94.0  74.0 - 97.0 FL Final    MCH 08/19/2019 33.2  24.0 - 34.0 PG Final    MCHC 08/19/2019 35.3  31.0 - 37.0 g/dL Final    RDW 08/19/2019 14.4  11.6 - 14.5 % Final    PLATELET 78/84/1771 122  135 - 420 K/uL Final    MPV 08/19/2019 10.4  9.2 - 11.8 FL Final    NEUTROPHILS 08/19/2019 71  40 - 73 % Final    LYMPHOCYTES 08/19/2019 23  21 - 52 % Final    MONOCYTES 08/19/2019 5  3 - 10 % Final    EOSINOPHILS 08/19/2019 1  0 - 5 % Final    BASOPHILS 08/19/2019 0  0 - 2 % Final    ABS. NEUTROPHILS 08/19/2019 4.0  1.8 - 8.0 K/UL Final    ABS. LYMPHOCYTES 08/19/2019 1.3  0.9 - 3.6 K/UL Final    ABS. MONOCYTES 08/19/2019 0.3  0.05 - 1.2 K/UL Final    ABS. EOSINOPHILS 08/19/2019 0.0  0.0 - 0.4 K/UL Final    ABS.  BASOPHILS 08/19/2019 0.0  0.0 - 0.1 K/UL Final    DF 08/19/2019 AUTOMATED    Final    Sodium 08/19/2019 136  136 - 145 mmol/L Final    Potassium 08/19/2019 4.3  3.5 - 5.5 mmol/L Final    Chloride 08/19/2019 102  100 - 111 mmol/L Final    CO2 08/19/2019 26  21 - 32 mmol/L Final    Anion gap 08/19/2019 8  3.0 - 18 mmol/L Final    Glucose 08/19/2019 136* 74 - 99 mg/dL Final    BUN 08/19/2019 36* 7.0 - 18 MG/DL Final    Creatinine 08/19/2019 1.06  0.6 - 1.3 MG/DL Final    BUN/Creatinine ratio 08/19/2019 34* 12 - 20   Final    GFR est AA 08/19/2019 59* >60 ml/min/1.73m2 Final    GFR est non-AA 08/19/2019 48* >60 ml/min/1.73m2 Final    Comment: (NOTE)  Estimated GFR is calculated using the Modification of Diet in Renal   Disease (MDRD) Study equation, reported for both  Americans   (GFRAA) and non- Americans (GFRNA), and normalized to 1.73m2   body surface area. The physician must decide which value applies to   the patient. The MDRD study equation should only be used in   individuals age 25 or older. It has not been validated for the   following: pregnant women, patients with serious comorbid conditions,   or on certain medications, or persons with extremes of body size,   muscle mass, or nutritional status.  Calcium 08/19/2019 9.0  8.5 - 10.1 MG/DL Final    Bilirubin, total 08/19/2019 0.3  0.2 - 1.0 MG/DL Final    ALT (SGPT) 08/19/2019 22  13 - 56 U/L Final    AST (SGOT) 08/19/2019 21  10 - 38 U/L Final    Alk. phosphatase 08/19/2019 48  45 - 117 U/L Final    Protein, total 08/19/2019 7.1  6.4 - 8.2 g/dL Final    Albumin 08/19/2019 3.7  3.4 - 5.0 g/dL Final    Globulin 08/19/2019 3.4  2.0 - 4.0 g/dL Final    A-G Ratio 08/19/2019 1.1  0.8 - 1.7   Final    Hemoglobin A1c 08/19/2019 6.8* 4.2 - 5.6 % Final    Comment: (NOTE)  HbA1C Interpretive Ranges  <5.7              Normal  5.7 - 6.4         Consider Prediabetes  >6.5              Consider Diabetes      Est. average glucose 08/19/2019 148  mg/dL Final    Comment: (NOTE)  The eAG should be interpreted with patient characteristics in mind   since ethnicity, interindividual differences, red cell lifespan,   variation in rates of glycation, etc. may affect the validity of the   calculation.       TSH 08/19/2019 0.35* 0.36 - 3.74 uIU/mL Final    Color 08/19/2019 YELLOW    Final    Appearance 08/19/2019 CLEAR    Final    Specific gravity 08/19/2019 1.011  1.005 - 1.030   Final    pH (UA) 08/19/2019 6.0  5.0 - 8.0   Final    Protein 08/19/2019 NEGATIVE   NEG mg/dL Final    Glucose 08/19/2019 NEGATIVE   NEG mg/dL Final    Ketone 08/19/2019 NEGATIVE   NEG mg/dL Final    Bilirubin 08/19/2019 NEGATIVE   NEG   Final    Blood 08/19/2019 NEGATIVE   NEG   Final    Urobilinogen 08/19/2019 1.0  0.2 - 1.0 EU/dL Final    Nitrites 08/19/2019 POSITIVE* NEG   Final    Leukocyte Esterase 08/19/2019 MODERATE* NEG   Final    WBC 08/19/2019 8 to 12  0 - 4 /hpf Final    RBC 08/19/2019 0  0 - 5 /hpf Final    Epithelial cells 08/19/2019 FEW  0 - 5 /lpf Final    Bacteria 08/19/2019 3+* NEG /hpf Final   Hospital Outpatient Visit on 06/19/2019   Component Date Value Ref Range Status    Sodium 06/19/2019 135* 136 - 145 mmol/L Final    Potassium 06/19/2019 4.3  3.5 - 5.5 mmol/L Final    Chloride 06/19/2019 100  100 - 108 mmol/L Final    CO2 06/19/2019 27  21 - 32 mmol/L Final    Anion gap 06/19/2019 8  3.0 - 18 mmol/L Final    Glucose 06/19/2019 130* 74 - 99 mg/dL Final    BUN 06/19/2019 38* 7.0 - 18 MG/DL Final    Creatinine 06/19/2019 1.02  0.6 - 1.3 MG/DL Final    BUN/Creatinine ratio 06/19/2019 37* 12 - 20   Final    GFR est AA 06/19/2019 >60  >60 ml/min/1.73m2 Final    GFR est non-AA 06/19/2019 51* >60 ml/min/1.73m2 Final    Comment: (NOTE)  Estimated GFR is calculated using the Modification of Diet in Renal   Disease (MDRD) Study equation, reported for both  Americans   (GFRAA) and non- Americans (GFRNA), and normalized to 1.73m2   body surface area. The physician must decide which value applies to   the patient. The MDRD study equation should only be used in   individuals age 25 or older. It has not been validated for the   following: pregnant women, patients with serious comorbid conditions,   or on certain medications, or persons with extremes of body size,   muscle mass, or nutritional status.       Calcium 06/19/2019 9.6  8.5 - 10.1 MG/DL Final       .  Results for orders placed or performed during the hospital encounter of 08/19/19   CBC WITH AUTOMATED DIFF   Result Value Ref Range    WBC 5.6 4.6 - 13.2 K/uL    RBC 3.68 (L) 4.20 - 5.30 M/uL    HGB 12.2 12.0 - 16.0 g/dL    HCT 34.6 (L) 35.0 - 45.0 %    MCV 94.0 74.0 - 97.0 FL MCH 33.2 24.0 - 34.0 PG    MCHC 35.3 31.0 - 37.0 g/dL    RDW 14.4 11.6 - 14.5 %    PLATELET 640 457 - 099 K/uL    MPV 10.4 9.2 - 11.8 FL    NEUTROPHILS 71 40 - 73 %    LYMPHOCYTES 23 21 - 52 %    MONOCYTES 5 3 - 10 %    EOSINOPHILS 1 0 - 5 %    BASOPHILS 0 0 - 2 %    ABS. NEUTROPHILS 4.0 1.8 - 8.0 K/UL    ABS. LYMPHOCYTES 1.3 0.9 - 3.6 K/UL    ABS. MONOCYTES 0.3 0.05 - 1.2 K/UL    ABS. EOSINOPHILS 0.0 0.0 - 0.4 K/UL    ABS. BASOPHILS 0.0 0.0 - 0.1 K/UL    DF AUTOMATED     METABOLIC PANEL, COMPREHENSIVE   Result Value Ref Range    Sodium 136 136 - 145 mmol/L    Potassium 4.3 3.5 - 5.5 mmol/L    Chloride 102 100 - 111 mmol/L    CO2 26 21 - 32 mmol/L    Anion gap 8 3.0 - 18 mmol/L    Glucose 136 (H) 74 - 99 mg/dL    BUN 36 (H) 7.0 - 18 MG/DL    Creatinine 1.06 0.6 - 1.3 MG/DL    BUN/Creatinine ratio 34 (H) 12 - 20      GFR est AA 59 (L) >60 ml/min/1.73m2    GFR est non-AA 48 (L) >60 ml/min/1.73m2    Calcium 9.0 8.5 - 10.1 MG/DL    Bilirubin, total 0.3 0.2 - 1.0 MG/DL    ALT (SGPT) 22 13 - 56 U/L    AST (SGOT) 21 10 - 38 U/L    Alk.  phosphatase 48 45 - 117 U/L    Protein, total 7.1 6.4 - 8.2 g/dL    Albumin 3.7 3.4 - 5.0 g/dL    Globulin 3.4 2.0 - 4.0 g/dL    A-G Ratio 1.1 0.8 - 1.7     HEMOGLOBIN A1C WITH EAG   Result Value Ref Range    Hemoglobin A1c 6.8 (H) 4.2 - 5.6 %    Est. average glucose 148 mg/dL   TSH 3RD GENERATION   Result Value Ref Range    TSH 0.35 (L) 0.36 - 3.74 uIU/mL   URINALYSIS W/MICROSCOPIC   Result Value Ref Range    Color YELLOW      Appearance CLEAR      Specific gravity 1.011 1.005 - 1.030      pH (UA) 6.0 5.0 - 8.0      Protein NEGATIVE  NEG mg/dL    Glucose NEGATIVE  NEG mg/dL    Ketone NEGATIVE  NEG mg/dL    Bilirubin NEGATIVE  NEG      Blood NEGATIVE  NEG      Urobilinogen 1.0 0.2 - 1.0 EU/dL    Nitrites POSITIVE (A) NEG      Leukocyte Esterase MODERATE (A) NEG      WBC 8 to 12 0 - 4 /hpf    RBC 0 0 - 5 /hpf    Epithelial cells FEW 0 - 5 /lpf    Bacteria 3+ (A) NEG /hpf       Assessment / Plan      ICD-10-CM ICD-9-CM    1. Controlled type 2 diabetes mellitus without complication, with long-term current use of insulin (HCC) E11.9 250.00 CBC WITH AUTOMATED DIFF    Z93.1 U43.97 METABOLIC PANEL, COMPREHENSIVE      HEMOGLOBIN A1C WITH EAG      TSH 3RD GENERATION      URINALYSIS W/MICROSCOPIC   2. Hx of subdural hematoma Z86.79 V12.59    3. Memory loss R41.3 780.93    4. Nocturia R35.1 788.43    5. Hyperthyroidism E05.90 242.90        Is impossible to initiate any changes in therapy without a up-to-date list of the patient's medications. I discussed the importance of medication list with the patient. A CBC, conference of metabolic panel, hemoglobin A1c, and TSH were drawn. On Wednesday I will go to make her house to review the medication administration record on site. At that time I will update our records and consider changes in therapy for diabetes mellitus, hyperthyroidism, and urinary complaints. Follow-up and Dispositions    · Return in about 3 months (around 11/19/2019). I asked Selvin Vilchis if she has any questions and I answered the questions. Selvin Vilchis states that she understands the treatment plan and agrees with the treatment plan          THIS DOCUMENT WAS CREATED WITH A VOICE ACTIVATED DICTATION SYSTEM.   IT MAY CONTAIN TRANSCRIPTION ERRORS

## 2019-09-17 ENCOUNTER — TELEPHONE (OUTPATIENT)
Dept: FAMILY MEDICINE CLINIC | Facility: CLINIC | Age: 84
End: 2019-09-17

## 2019-09-17 NOTE — TELEPHONE ENCOUNTER
10 [facility] spoke with Jaida Little nurse. I informed her that Ms. Jennifer Daniel needs appointment before she can be prescribed any sleep medications or accu-checks to be discontinued. Nurse verbalized her understanding.

## 2019-10-08 ENCOUNTER — TELEPHONE (OUTPATIENT)
Dept: FAMILY MEDICINE CLINIC | Facility: CLINIC | Age: 84
End: 2019-10-08

## 2019-10-08 NOTE — TELEPHONE ENCOUNTER
Patient called asking to speak with Dr. Caroline Waldron about overactive bladder medication( myrbetiq). She states the medication is to expensive and wants to know if she can be taken off of it.   Please call her back at [phone]

## 2019-10-18 ENCOUNTER — TELEPHONE (OUTPATIENT)
Dept: FAMILY MEDICINE CLINIC | Facility: CLINIC | Age: 84
End: 2019-10-18

## 2019-10-23 ENCOUNTER — TELEPHONE (OUTPATIENT)
Dept: FAMILY MEDICINE CLINIC | Facility: CLINIC | Age: 84
End: 2019-10-23

## 2019-11-12 NOTE — TELEPHONE ENCOUNTER
Patient request refill of her eye drops and would like to increase dose to 3 times daily. Currently 2 times daily. Prescription goes to [facility]. Please advise.
[Pre-menarchal] : pre-menarchal

## 2019-11-19 ENCOUNTER — TELEPHONE (OUTPATIENT)
Dept: FAMILY MEDICINE CLINIC | Facility: CLINIC | Age: 84
End: 2019-11-19

## 2019-11-19 NOTE — TELEPHONE ENCOUNTER
Patient niece called stating she thing Ms. Leilani Sandhoff might have an UTI. Please fax over the order to test for UTI to [facility].

## 2019-11-25 RX ORDER — NITROFURANTOIN 25; 75 MG/1; MG/1
100 CAPSULE ORAL 2 TIMES DAILY
Qty: 14 CAP | Refills: 0 | Status: SHIPPED | OUTPATIENT
Start: 2019-11-25 | End: 2020-01-08

## 2019-11-29 RX ORDER — AMOXICILLIN AND CLAVULANATE POTASSIUM 875; 125 MG/1; MG/1
TABLET, FILM COATED ORAL
Qty: 14 TAB | Refills: 0 | Status: SHIPPED | OUTPATIENT
Start: 2019-11-29 | End: 2020-01-08

## 2019-12-04 NOTE — PROGRESS NOTES
[facility]  Progress Note    Patient: Bola Delarosa Age: 80 y.o. : 1926 MR#: [id number] SSN: [id number]  Date/Time: 2017 10:55 AM    Subjective:     Complains of some neck pain and stiffness, which is better while sitting up a bit. No F/C, N/V, CP, SOB. Assessment/Plan:   1. Acute inf STEMI - declines intervention. Med tx. No CP presently despite significantly elevated trop I @ 83.7. Echo with EF 55-60%, no obvious wall motion abnml. ASA, Plavix, statin. No BBlocker given significant bradycardia earlier with hx intermittent jxnl bradycardia. Off heparin gtt, wean NTG gtt as tolerated. Pt wishes for conservative mgmt. 2. Constipation - as above. Bowel regimen. 3. HTN - BPs wnl.   4. DM - by hx. Holding Amaryl. Check HgbA1c. SSI. 5. Hyperlipidemia - statin. 6. COPD - no acute. 7. BURT, dehydration - prerenal. Resolved with fluids. Will decrease IVFluids. 8. Hx thyrotoxicosis with goiter - methimazole. TSH noted. 9. Hx chronic anemia due iron deficiency - baseline Hgb 10-12. Slight drop from admission. Off heparin gtt. Is Fe-def, supplementing. Hemodynamically stable. 10. Pt is underweight - supplement. Nutrition consult. 11. Chart reviewed. DNR. Additional Notes:    ADDENDUM: case d/w palliative care. Plan for xfer to [facility] with hospice, aiming for tomorrow. Holden Hunter MD  2017  2:47 PM    Case d/w PCP. Pt had spoken with him and is concerned about hospice, stating that she'd like to pursue therapy. Pt is s/p acute MI. Would hold off on any exertional activity presently, would consider PT/OT at later time at [facility]. Spoke again with palliative care. Pt seemed to express good understanding of hospice and agreed with plan of care. Will speak with pt.    Holden Hunter MD  2017  3:11 PM      Case discussed with:  [x]Patient  []Family  []Nursing  []Case Management  DVT Prophylaxis:  []Lovenox  [x]Hep SQ  []SCDs []Coumadin   []On Heparin gtt    Objective:   VS:   Visit Vitals    /70 (BP 1 Location: Left arm, BP Patient Position: At rest)    Pulse 69    Temp 98.4 °F (36.9 °C)    Resp 18    Ht 5' 2\" (1.575 m)    Wt 45.6 kg (100 lb 8 oz)    SpO2 95%    BMI 18.38 kg/m2      Tmax/24hrs: Temp (24hrs), Av °F (36.7 °C), Min:97.5 °F (36.4 °C), Max:98.4 °F (36.9 °C)      Intake/Output Summary (Last 24 hours) at 17 1115  Last data filed at 17 0920   Gross per 24 hour   Intake              958 ml   Output                2 ml   Net              956 ml       General:  Awake, alert, NAD. Cardiovascular:  RRR. Pulmonary:  CTA B.  GI:  Soft, NT/ND, NABS. Extremities:  No CT or edema. Additional:      Labs:    Recent Results (from the past 24 hour(s))   CBC WITH AUTOMATED DIFF    Collection Time: 17  3:40 AM   Result Value Ref Range    WBC 6.6 4.6 - 13.2 K/uL    RBC 2.90 (L) 4.20 - 5.30 M/uL    HGB 9.6 (L) 12.0 - 16.0 g/dL    HCT 26.9 (L) 35.0 - 45.0 %    MCV 92.8 74.0 - 97.0 FL    MCH 33.1 24.0 - 34.0 PG    MCHC 35.7 31.0 - 37.0 g/dL    RDW 13.6 11.6 - 14.5 %    PLATELET 539 852 - 645 K/uL    MPV 10.8 9.2 - 11.8 FL    NEUTROPHILS 73 40 - 73 %    LYMPHOCYTES 9 (L) 21 - 52 %    MONOCYTES 18 (H) 3 - 10 %    EOSINOPHILS 0 0 - 5 %    BASOPHILS 0 0 - 2 %    ABS. NEUTROPHILS 4.8 1.8 - 8.0 K/UL    ABS. LYMPHOCYTES 0.6 (L) 0.9 - 3.6 K/UL    ABS. MONOCYTES 1.2 0.05 - 1.2 K/UL    ABS. EOSINOPHILS 0.0 0.0 - 0.4 K/UL    ABS.  BASOPHILS 0.0 0.0 - 0.1 K/UL    DF AUTOMATED     IRON PROFILE    Collection Time: 17  3:40 AM   Result Value Ref Range    Iron 31 (L) 50 - 175 ug/dL    TIBC 240 (L) 250 - 450 ug/dL    Iron % saturation 13 %   VITAMIN B12 & FOLATE    Collection Time: 17  3:40 AM   Result Value Ref Range    Vitamin B12 944 (H) 211 - 911 pg/mL    Folate 10.8 3.10 - 17.50 ng/mL       Signed By: Zena Claire MD     May 5, 2017 10:55 AM Complex Repair And Graft Additional Text (Will Appearing After The Standard Complex Repair Text): The complex repair was not sufficient to completely close the primary defect. The remaining additional defect was repaired with the graft mentioned below.

## 2019-12-30 ENCOUNTER — TELEPHONE (OUTPATIENT)
Dept: FAMILY MEDICINE CLINIC | Facility: CLINIC | Age: 84
End: 2019-12-30

## 2019-12-30 NOTE — TELEPHONE ENCOUNTER
Patient states she talked to Medicare and they told her she qualifies for a walker. [facility] [address]. Please advise patient when taken care of.

## 2020-01-01 ENCOUNTER — VIRTUAL VISIT (OUTPATIENT)
Dept: FAMILY MEDICINE CLINIC | Facility: CLINIC | Age: 85
End: 2020-01-01

## 2020-01-01 ENCOUNTER — TELEPHONE (OUTPATIENT)
Dept: FAMILY MEDICINE CLINIC | Age: 85
End: 2020-01-01

## 2020-01-01 ENCOUNTER — TELEPHONE (OUTPATIENT)
Dept: FAMILY MEDICINE CLINIC | Facility: CLINIC | Age: 85
End: 2020-01-01

## 2020-01-01 ENCOUNTER — VIRTUAL VISIT (OUTPATIENT)
Dept: FAMILY MEDICINE CLINIC | Age: 85
End: 2020-01-01
Payer: MEDICARE

## 2020-01-01 DIAGNOSIS — E11.9 CONTROLLED TYPE 2 DIABETES MELLITUS WITHOUT COMPLICATION, WITH LONG-TERM CURRENT USE OF INSULIN (HCC): ICD-10-CM

## 2020-01-01 DIAGNOSIS — E11.21 TYPE 2 DIABETES MELLITUS WITH NEPHROPATHY (HCC): ICD-10-CM

## 2020-01-01 DIAGNOSIS — R30.0 DYSURIA: ICD-10-CM

## 2020-01-01 DIAGNOSIS — I10 ESSENTIAL HYPERTENSION: Primary | ICD-10-CM

## 2020-01-01 DIAGNOSIS — I10 ESSENTIAL HYPERTENSION: ICD-10-CM

## 2020-01-01 DIAGNOSIS — H35.30 MACULAR DEGENERATION, UNSPECIFIED LATERALITY, UNSPECIFIED TYPE: ICD-10-CM

## 2020-01-01 DIAGNOSIS — Z86.79 HX OF SUBDURAL HEMATOMA: ICD-10-CM

## 2020-01-01 DIAGNOSIS — Z79.4 CONTROLLED TYPE 2 DIABETES MELLITUS WITHOUT COMPLICATION, WITH LONG-TERM CURRENT USE OF INSULIN (HCC): ICD-10-CM

## 2020-01-01 DIAGNOSIS — E78.00 PURE HYPERCHOLESTEROLEMIA: ICD-10-CM

## 2020-01-01 DIAGNOSIS — N64.4 BREAST PAIN, RIGHT: Primary | ICD-10-CM

## 2020-01-01 LAB
CREATININE, EXTERNAL: 1.19
HBA1C MFR BLD HPLC: 8.4 %
LDL-C, EXTERNAL: 93

## 2020-01-01 PROCEDURE — 99442 PR PHYS/QHP TELEPHONE EVALUATION 11-20 MIN: CPT | Performed by: EMERGENCY MEDICINE

## 2020-01-01 RX ORDER — SULFAMETHOXAZOLE AND TRIMETHOPRIM 800; 160 MG/1; MG/1
1 TABLET ORAL 2 TIMES DAILY
Qty: 10 TAB | Refills: 0 | Status: SHIPPED | OUTPATIENT
Start: 2020-01-01 | End: 2020-01-01

## 2020-01-08 ENCOUNTER — OFFICE VISIT (OUTPATIENT)
Dept: FAMILY MEDICINE CLINIC | Facility: CLINIC | Age: 85
End: 2020-01-08

## 2020-01-08 VITALS
RESPIRATION RATE: 12 BRPM | OXYGEN SATURATION: 96 % | HEIGHT: 61 IN | TEMPERATURE: 97.2 F | SYSTOLIC BLOOD PRESSURE: 119 MMHG | WEIGHT: 98 LBS | BODY MASS INDEX: 18.5 KG/M2 | DIASTOLIC BLOOD PRESSURE: 56 MMHG | HEART RATE: 75 BPM

## 2020-01-08 DIAGNOSIS — I10 ESSENTIAL HYPERTENSION: ICD-10-CM

## 2020-01-08 DIAGNOSIS — G47.00 INSOMNIA, UNSPECIFIED TYPE: ICD-10-CM

## 2020-01-08 DIAGNOSIS — Z79.4 CONTROLLED TYPE 2 DIABETES MELLITUS WITHOUT COMPLICATION, WITH LONG-TERM CURRENT USE OF INSULIN (HCC): ICD-10-CM

## 2020-01-08 DIAGNOSIS — R53.1 WEAKNESS GENERALIZED: ICD-10-CM

## 2020-01-08 DIAGNOSIS — R39.9 URINARY SYMPTOM OR SIGN: ICD-10-CM

## 2020-01-08 DIAGNOSIS — Z00.00 MEDICARE ANNUAL WELLNESS VISIT, SUBSEQUENT: Primary | ICD-10-CM

## 2020-01-08 DIAGNOSIS — E11.9 CONTROLLED TYPE 2 DIABETES MELLITUS WITHOUT COMPLICATION, WITH LONG-TERM CURRENT USE OF INSULIN (HCC): ICD-10-CM

## 2020-01-08 LAB
BILIRUB UR QL STRIP: NEGATIVE
GLUCOSE UR-MCNC: NEGATIVE MG/DL
KETONES P FAST UR STRIP-MCNC: NEGATIVE MG/DL
PH UR STRIP: 6 [PH] (ref 4.6–8)
PROT UR QL STRIP: NEGATIVE
SP GR UR STRIP: 1.01 (ref 1–1.03)
UA UROBILINOGEN AMB POC: NORMAL (ref 0.2–1)
URINALYSIS CLARITY POC: CLEAR
URINALYSIS COLOR POC: YELLOW
URINE BLOOD POC: NEGATIVE
URINE LEUKOCYTES POC: NEGATIVE
URINE NITRITES POC: NEGATIVE

## 2020-01-08 RX ORDER — MELATONIN 5 MG
5 CAPSULE ORAL
Qty: 30 CAP | Refills: 5 | Status: SHIPPED | OUTPATIENT
Start: 2020-01-08 | End: 2021-01-01

## 2020-01-08 NOTE — PROGRESS NOTES
This is the Subsequent Medicare Annual Wellness Exam, performed 12 months or more after the Initial AWV or the last Subsequent AWV  I have reviewed the patient's medical history in detail and updated the computerized patient record. History     Patient Active Problem List   Diagnosis Code    Hypoglycemia E16.2    Thyromegaly E01.0    NIDDM (non-insulin dependent diabetes mellitus)     HTN (hypertension) I10    Hyperlipidemia E78.5    Fatigue R53.83    Thyroid nodule E04.1    Mitral valve prolapse I34.1    Chronic airway obstruction, not elsewhere classified J44.9    Abnormality of gait R26.9    Acute upper respiratory infections of unspecified site J06.9    Senile osteoporosis M81.0    Urinary tract infection, site not specified N39.0    Chest pain R07.9    Thyrotoxicosis E05.90    Pure hypercholesterolemia E78.00    History of tachycardia Z87.898    Diabetes mellitus type 2, controlled ([facility]) E11.9    Hyperthyroidism E05.90    Urinary tract infection N39.0    Hypercholesterolemia E78.00    Hypertension I10    MVP (mitral valve prolapse) I34.1    Broken shoulder S42. 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    Overactive bladder N32.81    Controlled type 2 diabetes mellitus without complication, with long-term current use of insulin ([facility]) E11.9, Z79.4     Past Medical History:   Diagnosis Date    Abnormality of gait     Acute upper respiratory infections of unspecified site     Anemia     Broken shoulder     right    Chest pain     Chronic airway obstruction, not elsewhere classified     COPD     Diabetes ([address]     Essential hypertension, benign     Hearing loss     History of tachycardia     possible SVT    Hypercholesterolemia     Hyperlipidemia     Hypertension     Incontinence     Mitral valve prolapse     MVP (mitral valve prolapse)     OAB (overactive bladder)     Osteoarthritis     spine    Osteoporosis     Pure hypercholesterolemia     Senile osteoporosis     Thyroid disease     Thyrotoxicosis     Type II or unspecified type diabetes mellitus without mention of complication, not stated as uncontrolled     Urinary tract infection, site not specified       Past Surgical History:   Procedure Laterality Date    HX KNEE REPLACEMENT  left    HX OPEN REDUCTION INTERNAL FIXATION Left     patellar fracture    HX OTHER SURGICAL Right     shoulder surgery    TN XCAPSL CTRC RMVL INSJ IO LENS PROSTH CPLX WO ECP  [id number]    bilateral     Current Outpatient Medications   Medication Sig Dispense Refill    Walker misc 1 Each by Does Not Apply route daily. 1 Each 0    Carboxymethylcellulose-Glycern (REFRESH OPTIVE) 0.5-0.9 % drop Apply  to eye.  melatonin 5 mg cap capsule Take 1 Cap by mouth nightly. 30 Cap 5    mv,Ca,min-folic acid-vit K1 (ONE-A-DAY WOMEN'S 50 PLUS) 400-20 mcg tab Take 1 Tab by mouth daily. 30 Tab 5    glimepiride (AMARYL) 2 mg tablet Take 2 mg by mouth every morning.  bisacodyl (DULCOLAX, BISACODYL,) 10 mg suppository Insert 10 mg into rectum daily as needed (constipation).  amLODIPine (NORVASC) 5 mg tablet TAKE 1 TABLET BY MOUTH ONCE DAILY FOR HTN 30 Tab 0    aspirin delayed-release 81 mg tablet TAKE 1 TABLET BY MOUTH ONCE DAILY FOR HEART HEALTH 30 Tab 0    lovastatin (MEVACOR) 20 mg tablet TAKE 1 TABLET BY MOUTH DAILY IN THE EVENING FOR HYPERLIPEDEMIA 30 Tab 0    SOD PHOS,M-B/NA PHOS,DI-BA (FLEET ENEMA RE) Insert 1 Dose into rectum daily as needed (Constipation (if no BM, notify MD)).       polyethylene glycol (MIRALAX) 17 gram packet Take 17 g by mouth two (2) times daily as needed (Constipation).  EASY TOUCH INSULIN SAFETY SYR 0.5 mL 30 gauge x 5/16\" syrg Use subcutaneously as directed with insulin.  TRUEPLUS LANCETS 30 gauge misc Use to check blood glucose up to twice daily DX:e11.9 300 Lancet 3     Allergies   Allergen Reactions    Nitrofurantoin Nausea and Vomiting       Family History   Problem Relation Age of Onset    Diabetes Mother     Alcohol abuse Father     Arthritis-rheumatoid Sister     Diabetes Brother     Other Brother         eye problems    Hypertension Son     Diabetes Son     Cancer Son         Pancreatic cancer    Alcohol abuse Son      Social History     Tobacco Use    Smoking status: Never Smoker    Smokeless tobacco: Never Used   Substance Use Topics    Alcohol use: No       Depression Risk Factor Screening:     3 most recent PHQ Screens 1/8/2020   Little interest or pleasure in doing things Not at all   Feeling down, depressed, irritable, or hopeless Not at all   Total Score PHQ 2 0       Alcohol Risk Factor Screening:   Do you average 1 drink per night or more than 7 drinks a week:  No    On any one occasion in the past three months have you have had more than 3 drinks containing alcohol:  No      Functional Ability and Level of Safety:   Hearing: Hearing is good. The patient wears hearing aids. Activities of Daily Living: The home contains: grab bars  Patient does total self care    Ambulation: with no difficulty    Fall Risk:  Fall Risk Assessment, last 12 mths 1/8/2020   Able to walk? Yes   Fall in past 12 months?  No   Fall with injury? -   Number of falls in past 12 months -   Fall Risk Score -       Abuse Screen:  Patient is not abused    Cognitive Screening   Has your family/caregiver stated any concerns about your memory: no  Cognitive Screening: Normal - Clock Drawing Test    Patient Care Team   Patient Care Team:  Citlalli Wooten MD as PCP - General (Internal Medicine)  Citlalli Wooten MD as PCP - REHABILITATION [facility] Provider  Butch Taveras MD as Physician (Orthopedic Surgery)  Terra Ibrahim MD as Physician (Internal Medicine)  Kellie Richard MD as Physician (Urology)  Olive Bills MD as Physician (Ophthalmology)  Christal Bryant MD as Physician (Urology)    Assessment/Plan   Education and counseling provided:  Are appropriate based on today's review and evaluation    Diagnoses and all orders for this visit:    1. Medicare annual wellness visit, subsequent    2. Essential hypertension    3. Controlled type 2 diabetes mellitus without complication, with long-term current use of insulin ([address]    4. Weakness generalized  -     Walker misc; 1 Each by Does Not Apply route daily. 5. Insomnia, unspecified type    6. Urinary symptom or sign  -     AMB POC URINALYSIS DIP STICK MANUAL W/O MICRO    Other orders  -     melatonin 5 mg cap capsule; Take 1 Cap by mouth nightly. Health Maintenance Due   Topic Date Due    FOOT EXAM Q1  04/[birth year]/1936    Shingrix Vaccine Age 50> (1 of 2) 04/24/1976    Bone Densitometry (Dexa) Screening  04/24/1991    Pneumococcal 65+ years (1 of 1 - PPSV23) 04/24/1991    MICROALBUMIN Q1  01/19/2017    LIPID PANEL Q1  05/02/2019    Influenza Age 9 to Adult  08/01/2019     Phil Cao is a 80 y.o. female presenting today for Follow-up (patient needs walker) and Annual Wellness Visit    HPI:  Phil Cao presents to the office today for routine follow-up care. Patient has a past medical history for hypertension, mitral valve prolapse, hypothyroidism, diabetes, hyperlipidemia and osteoarthritis. She presents today without any complaints of pain. Her blood pressure is controlled at 119/56 and she is negative for chest pain or palpitation. She lives in an assisted living facility, [address] [place] [address]. She is requesting a prescription for a walker and would like the prescription faxed to the drug center. .    Review of Systems   Constitutional: Negative for malaise/fatigue. Respiratory: Negative for cough and sputum production. Cardiovascular: Negative for chest pain and palpitations. Gastrointestinal: Negative for abdominal pain, nausea and vomiting. Genitourinary: Positive for frequency and urgency. Negative for dysuria. Musculoskeletal: Positive for joint pain ( History of osteoarthritis). Neurological: Negative for dizziness. Allergies   Allergen Reactions    Nitrofurantoin Nausea and Vomiting       Current Outpatient Medications   Medication Sig Dispense Refill    Walker misc 1 Each by Does Not Apply route daily. 1 Each 0    Carboxymethylcellulose-Glycern (REFRESH OPTIVE) 0.5-0.9 % drop Apply  to eye.  melatonin 5 mg cap capsule Take 1 Cap by mouth nightly. 30 Cap 5    mv,Ca,min-folic acid-vit K1 (ONE-A-DAY WOMEN'S 50 PLUS) 400-20 mcg tab Take 1 Tab by mouth daily. 30 Tab 5    glimepiride (AMARYL) 2 mg tablet Take 2 mg by mouth every morning.  bisacodyl (DULCOLAX, BISACODYL,) 10 mg suppository Insert 10 mg into rectum daily as needed (constipation).  amLODIPine (NORVASC) 5 mg tablet TAKE 1 TABLET BY MOUTH ONCE DAILY FOR HTN 30 Tab 0    aspirin delayed-release 81 mg tablet TAKE 1 TABLET BY MOUTH ONCE DAILY FOR HEART HEALTH 30 Tab 0    lovastatin (MEVACOR) 20 mg tablet TAKE 1 TABLET BY MOUTH DAILY IN THE EVENING FOR HYPERLIPEDEMIA 30 Tab 0    SOD PHOS,M-B/NA PHOS,DI-BA (FLEET ENEMA RE) Insert 1 Dose into rectum daily as needed (Constipation (if no BM, notify MD)).  polyethylene glycol (MIRALAX) 17 gram packet Take 17 g by mouth two (2) times daily as needed (Constipation).  EASY TOUCH INSULIN SAFETY SYR 0.5 mL 30 gauge x 5/16\" syrg Use subcutaneously as directed with insulin.       TRUEPLUS LANCETS 30 gauge misc Use to check blood glucose up to twice daily DX:e11.9 300 Lancet 3       Past Medical History:   Diagnosis Date    Abnormality of gait     Acute upper respiratory infections of unspecified site     Anemia     Broken shoulder     right    Chest pain     Chronic airway obstruction, not elsewhere classified     COPD     Diabetes [facility]     Essential hypertension, benign     Hearing loss     History of tachycardia     possible SVT    Hypercholesterolemia     Hyperlipidemia     Hypertension     Incontinence     Mitral valve prolapse     MVP (mitral valve prolapse)     OAB (overactive bladder)     Osteoarthritis     spine    Osteoporosis     Pure hypercholesterolemia     Senile osteoporosis     Thyroid disease     Thyrotoxicosis     Type II or unspecified type diabetes mellitus without mention of complication, not stated as uncontrolled     Urinary tract infection, site not specified        Past Surgical History:   Procedure Laterality Date    HX KNEE REPLACEMENT  left    HX OPEN REDUCTION INTERNAL FIXATION Left     patellar fracture    HX OTHER SURGICAL Right     shoulder surgery    ND XCAPSL CTRC RMVL INSJ IO LENS PROSTH CPLX WO ECP  [id number]    bilateral       Social History     Socioeconomic History    Marital status:      Spouse name: Not on file    Number of children: Not on file    Years of education: Not on file    Highest education level: Not on file   Occupational History    Not on file   Social Needs    Financial resource strain: Not on file    Food insecurity:     Worry: Not on file     Inability: Not on file    Transportation needs:     Medical: Not on file     Non-medical: Not on file   Tobacco Use    Smoking status: Never Smoker    Smokeless tobacco: Never Used   Substance and Sexual Activity    Alcohol use: No    Drug use: No    Sexual activity: Never   Lifestyle    Physical activity:     Days per week: Not on file     Minutes per session: Not on file    Stress: Not on file   Relationships    Social connections:     Talks on phone: Not on file     Gets together: Not on file     Attends [employer] service: Not on file     Active member of club or organization: Not on file Attends meetings of clubs or organizations: Not on file     Relationship status: Not on file    Intimate partner violence:     Fear of current or ex partner: Not on file     Emotionally abused: Not on file     Physically abused: Not on file     Forced sexual activity: Not on file   Other Topics Concern    Not on file   Social History Narrative    Not on file       Patient does have an advanced directive on file    Vitals:    01/08/20 1457   BP: 119/56   Pulse: 75   Resp: 12   Temp: 97.2 °F (36.2 °C)   TempSrc: Oral   SpO2: 96%   Weight: 98 lb (44.5 kg)   Height: 5' 1\" (1.549 m)   PainSc:   0 - No pain       Physical Exam  Vitals signs and nursing note reviewed. Cardiovascular:      Rate and Rhythm: Normal rate and regular rhythm. Pulses: Normal pulses. Heart sounds: Normal heart sounds. Pulmonary:      Effort: Pulmonary effort is normal.      Breath sounds: Normal breath sounds. Abdominal:      General: Abdomen is flat. Bowel sounds are normal.   Skin:     General: Skin is warm and dry. Neurological:      Mental Status: She is alert.          Office Visit on 01/08/2020   Component Date Value Ref Range Status    Color (UA POC) 01/08/2020 Yellow   Final    Clarity (UA POC) 01/08/2020 Clear   Final    Glucose (UA POC) 01/08/2020 Negative  Negative Final    Bilirubin (UA POC) 01/08/2020 Negative  Negative Final    Ketones (UA POC) 01/08/2020 Negative  Negative Final    Specific gravity (UA POC) 01/08/2020 1.015  1.001 - 1.035 Final    Blood (UA POC) 01/08/2020 Negative  Negative Final    pH (UA POC) 01/08/2020 6.0  4.6 - 8.0 Final    Protein (UA POC) 01/08/2020 Negative  Negative Final    Urobilinogen (UA POC) 01/08/2020 0.2 mg/dL  0.2 - 1 Final    Nitrites (UA POC) 01/08/2020 Negative  Negative Final    Leukocyte esterase (UA POC) 01/08/2020 Negative  Negative Final       .  Results for orders placed or performed in visit on 01/08/20   AMB POC URINALYSIS DIP STICK MANUAL W/O MICRO Result Value Ref Range    Color (UA POC) Yellow     Clarity (UA POC) Clear     Glucose (UA POC) Negative Negative    Bilirubin (UA POC) Negative Negative    Ketones (UA POC) Negative Negative    Specific gravity (UA POC) 1.015 1.001 - 1.035    Blood (UA POC) Negative Negative    pH (UA POC) 6.0 4.6 - 8.0    Protein (UA POC) Negative Negative    Urobilinogen (UA POC) 0.2 mg/dL 0.2 - 1    Nitrites (UA POC) Negative Negative    Leukocyte esterase (UA POC) Negative Negative       Assessment / Plan:      ICD-10-CM ICD-9-CM    1. Medicare annual wellness visit, subsequent Z00.00 V70.0    2. Essential hypertension I10 401.9    3. Controlled type 2 diabetes mellitus without complication, with long-term current use of insulin ([facility]) E11.9 250.00     Z79.4 V58.67    4. Weakness generalized R53.1 780.79 Walker misc      DISCONTINUED: Walker misc   5. Insomnia, unspecified type G47.00 780.52    6. Urinary symptom or sign R39.9 788.99 AMB POC URINALYSIS DIP STICK MANUAL W/O MICRO     Follow-up and Dispositions    · Return in about 4 months (around 5/8/2020), or if symptoms worsen or fail to improve. Medicare wellness completed  Prescription for walker given  HTN- controlled  Negative urinalysis    I asked the patient if she  had any questions and answered her  questions. The patient stated that she understands the treatment plan and agrees with the treatment plan    This document was created with a voice activated dictation system and may contain transcription errors.

## 2020-01-08 NOTE — PROGRESS NOTES
Visit Vitals  /56   Pulse 75   Temp 97.2 °F (36.2 °C) (Oral)   Resp 12   Ht 5' 1\" (1.549 m)   Wt 98 lb (44.5 kg)   SpO2 96%   BMI 18.52 kg/m²     Iona Walls presents today for   Chief Complaint   Patient presents with    Follow-up     patient needs walker    Annual Wellness Visit       Is someone accompanying this pt? no    Is the patient using any DME equipment during OV? no    Depression Screening:  3 most recent PHQ Screens 1/8/2020   Little interest or pleasure in doing things Not at all   Feeling down, depressed, irritable, or hopeless Not at all   Total Score PHQ 2 0       Learning Assessment:  Learning Assessment 1/19/2016   PRIMARY LEARNER Patient   HIGHEST LEVEL OF EDUCATION - PRIMARY LEARNER  GRADUATED HIGH SCHOOL OR GED   BARRIERS PRIMARY LEARNER [address] CAREGIVER Yes   CO-LEARNER NAME [id number] [employer] LEVEL OF EDUCATION SOME COLLEGE   BARRIERS CO-LEARNER NONE   PRIMARY LANGUAGE ENGLISH   PRIMARY LANGUAGE [address] [facility]    NEED No   LEARNER PREFERENCE PRIMARY PICTURES     DEMONSTRATION   LEARNER PREFERENCE CO-LEARNER DEMONSTRATION   ANSWERED BY patient   RELATIONSHIP SELF       Abuse Screening:  Abuse Screening Questionnaire 1/15/2018   Do you ever feel afraid of your partner? N   Are you in a relationship with someone who physically or mentally threatens you? N   Is it safe for you to go home? Y       Fall Risk  Fall Risk Assessment, last 12 mths 1/8/2020   Able to walk? Yes   Fall in past 12 months? No   Fall with injury? -   Number of falls in past 12 months -   Fall Risk Score -       Health Maintenance reviewed and discussed and ordered per Provider.     Health Maintenance Due   Topic Date Due    FOOT EXAM Q1  04/[birth year]/1936    Shingrix Vaccine Age 50> (1 of 2) 04/24/1976    Bone Densitometry (Dexa) Screening  04/24/1991    Pneumococcal 65+ years (1 of 1 - PPSV23) 04/24/1991    MICROALBUMIN Q1  01/19/2017    MEDICARE YEARLY EXAM  03/14/2018    LIPID PANEL Q1  05/02/2019    Influenza Age 5 to Adult  08/01/2019           Coordination of Care:  1. Have you been to the ER, urgent care clinic since your last visit? Hospitalized since your last visit? no    2. Have you seen or consulted any other health care providers outside of the [address] [place] [address] since your last visit? Include any pap smears or colon screening.  no

## 2020-01-08 NOTE — PATIENT INSTRUCTIONS
Medicare Wellness Visit, Female     The best way to live healthy is to have a lifestyle where you eat a well-balanced diet, exercise regularly, limit alcohol use, and quit all forms of tobacco/nicotine, if applicable. Regular preventive services are another way to keep healthy. Preventive services (vaccines, screening tests, monitoring & exams) can help personalize your care plan, which helps you manage your own care. Screening tests can find health problems at the earliest stages, when they are easiest to treat. [facility] follows the current, evidence-based guidelines published by the [facility] ([facility]) when recommending preventive services for our patients. Because we follow these guidelines, sometimes recommendations change over time as research supports it. (For example, mammograms used to be recommended annually. Even though Medicare will still pay for an annual mammogram, the newer guidelines recommend a mammogram every two years for women of average risk). Of course, you and your doctor may decide to screen more often for some diseases, based on your risk and your co-morbidities (chronic disease you are already diagnosed with). Preventive services for you include:  - Medicare offers their members a free annual wellness visit, which is time for you and your primary care provider to discuss and plan for your preventive service needs. Take advantage of this benefit every year!  -All adults over the age of 72 should receive the recommended pneumonia vaccines. Current USPSTF guidelines recommend a series of two vaccines for the best pneumonia protection.   -All adults should have a flu vaccine yearly and a tetanus vaccine every 10 years.   -All adults age 48 and older should receive the shingles vaccines (series of two vaccines).       -All adults age 38-68 who are overweight should have a diabetes screening test once every three years.   -All adults born between [birth year] and 1965 should be screened once for Hepatitis C.  -Other screening tests and preventive services for persons with diabetes include: an eye exam to screen for diabetic retinopathy, a kidney function test, a foot exam, and stricter control over your cholesterol.   -Cardiovascular screening for adults with routine risk involves an electrocardiogram (ECG) at intervals determined by your doctor.   -Colorectal cancer screenings should be done for adults age 54-65 with no increased risk factors for colorectal cancer. There are a number of acceptable methods of screening for this type of cancer. Each test has its own benefits and drawbacks. Discuss with your doctor what is most appropriate for you during your annual wellness visit. The different tests include: colonoscopy (considered the best screening method), a fecal occult blood test, a fecal DNA test, and sigmoidoscopy.    -A bone mass density test is recommended when a woman turns 65 to screen for osteoporosis. This test is only recommended one time, as a screening. Some providers will use this same test as a disease monitoring tool if you already have osteoporosis. -Breast cancer screenings are recommended every other year for women of normal risk, age 54-69.  -Cervical cancer screenings for women over age 72 are only recommended with certain risk factors.      Here is a list of your current Health Maintenance items (your personalized list of preventive services) with a due date:  Health Maintenance Due   Topic Date Due    Diabetic Foot Care  04/24/1936    Shingles Vaccine (1 of 2) 04/24/1976    Bone Mineral Density   04/24/1991    Pneumococcal Vaccine (1 of 1 - PPSV23) 04/24/1991    Albumin Urine Test  01/19/2017    Annual Well Visit  03/14/2018    Cholesterol Test   05/02/2019    Flu Vaccine  08/01/2019

## 2020-01-14 ENCOUNTER — TELEPHONE (OUTPATIENT)
Dept: FAMILY MEDICINE CLINIC | Facility: CLINIC | Age: 85
End: 2020-01-14

## 2020-01-22 DIAGNOSIS — R53.1 WEAKNESS GENERALIZED: ICD-10-CM

## 2020-01-23 ENCOUNTER — TELEPHONE (OUTPATIENT)
Dept: FAMILY MEDICINE CLINIC | Facility: CLINIC | Age: 85
End: 2020-01-23

## 2020-01-29 DIAGNOSIS — R53.1 WEAKNESS GENERALIZED: ICD-10-CM

## 2020-01-30 ENCOUNTER — TELEPHONE (OUTPATIENT)
Dept: FAMILY MEDICINE CLINIC | Facility: CLINIC | Age: 85
End: 2020-01-30

## 2020-01-30 NOTE — TELEPHONE ENCOUNTER
[facility] called to verify information on walker.   Please call them back at [phone]

## 2020-02-19 NOTE — TELEPHONE ENCOUNTER
River Tran at [address] [employer] called stating the patient fell on her butt. Patient did not lock her chair.   No injuries noted

## 2020-02-20 NOTE — TELEPHONE ENCOUNTER
Spoke patient nurse Tomás Nicolas at [facility]. She stated patient did not hit her head and not complaining of any pain. Patient is doing her normal activity this morning.

## 2020-06-12 NOTE — PROGRESS NOTES
Consent: Shirlene Segovia, who was seen by synchronous (real-time) audio-video technology, and/or her healthcare decision maker, is aware that this patient-initiated, Telehealth encounter on 6/12/2020 is a billable service, with coverage as determined by her insurance carrier. She is aware that she may receive a bill and has provided verbal consent to proceed: Yes. The patient was at home and I was at the offices of the [address] [place] [address], a family member participated in the service with the patient's permission. ASSESSMENT and PLAN 
  ICD-10-CM ICD-9-CM 1. Breast pain, right N64.4 611.71 Discussed with patient and family member. As there is no pain now and no signs of damage or other worrisome signs, would just observe for now call if symptoms 2. Essential hypertension I10 401.9 This is a chronic problem. It is presently well controlled. We will continue the same treatment. 3. Controlled type 2 diabetes mellitus without complication, with long-term current use of insulin ([facility]) E11.9 250.00   
 Z79.4 V58.67 This is a chronic problem. It is presently well controlled. We will continue the same treatment. Follow-up and Dispositions · Return in about 3 months (around 9/12/2020) for Follow up on illness. Health Maintenance Due Topic Date Due  Foot Exam Q1  04/24/1936  Shingrix Vaccine Age 50> (1 of 2) 04/24/1976  Bone Densitometry (Dexa) Screening  04/24/1991  Pneumococcal 65+ years (1 of 1 - PPSV23) 04/24/1991  MICROALBUMIN Q1  01/19/2017  Lipid Screen  05/02/2019 I spent at least 10 minutes with this established patient, and >50% of the time was spent counseling and/or coordinating care regarding Right wrist pain in decisions on further work-up versus observation, hypertension and diabetes. [address] Subjective:  
Shirlene Segovia is a 80 y.o. female who was seen for acute care.   She is usually followed by another provider. Her last visit was January 2020 when she was seen for a Medicare annual visit hypertension and diabetes. She had generalized weakness and insomnia Under right breast she cannot wear a bra because of rib fracture. She has pain in the right breast area. This is a recurrent problem. The location is the mid upper breast about 4 cm above the nipple. The onset was about 4 days prior to our visit. He symptoms her symptoms subsided and she has no pain at this time. There is been no nipple discharge. No puckering or thickening of the skin. She cannot remember any injury. She took nothing for relief and no other aggravating factors are admitted to. There is no fever or chills. She has had this in the past but cannot remember exactly when. Hypertension The patient has had no problem with the medication. The patient has no headaches, visual changes, chest pain or pressure,dyspnea, orthopnea, abdominal pain, dysuria, weakness, or paresthesias. BP Readings from Last 3 Encounters:  
01/08/20 119/56  
08/19/19 132/78  
06/19/19 120/70 Lab Results Component Value Date/Time Sodium 136 08/19/2019 01:28 PM  
 Potassium 4.3 08/19/2019 01:28 PM  
 Chloride 102 08/19/2019 01:28 PM  
 CO2 26 08/19/2019 01:28 PM  
 Anion gap 8 08/19/2019 01:28 PM  
 Glucose 136 (H) 08/19/2019 01:28 PM  
 BUN 36 (H) 08/19/2019 01:28 PM  
 Creatinine 1.06 08/19/2019 01:28 PM  
 BUN/Creatinine ratio 34 (H) 08/19/2019 01:28 PM  
 GFR est AA 59 (L) 08/19/2019 01:28 PM  
 GFR est non-AA 48 (L) 08/19/2019 01:28 PM  
 Calcium 9.0 08/19/2019 01:28 PM  
 
EKG Results None Key CAD CHF Meds   
    
  
 amLODIPine (NORVASC) 5 mg tablet TAKE 1 TABLET BY MOUTH ONCE DAILY FOR HTN  
 aspirin delayed-release 81 mg tablet TAKE 1 TABLET BY MOUTH ONCE DAILY FOR HEART HEALTH  
 lovastatin (MEVACOR) 20 mg tablet TAKE 1 TABLET BY MOUTH DAILY IN THE EVENING FOR HYPERLIPEDEMIA Diabetes mellitus The patient denies polyuria, polydipsia, or polyphagia. There has been no problem with the medications. Wt Readings from Last 3 Encounters:  
01/08/20 98 lb (44.5 kg) 08/19/19 103 lb 6.4 oz (46.9 kg) 06/19/19 104 lb (47.2 kg)  
 
body mass index is unknown because there is no height or weight on file. Lab Results Component Value Date/Time Hemoglobin A1c 6.8 (H) 08/19/2019 01:28 PM  
 Hemoglobin A1c, External 8.3 07/30/2015 Lab Results Component Value Date/Time Glucose 136 (H) 08/19/2019 01:28 PM  
 Glucose (POC) 290 (H) 05/08/2017 11:27 AM  
 
Key Antihyperglycemic Medications   
    
  
 glimepiride (AMARYL) 2 mg tablet Take 2 mg by mouth every morning. Current Outpatient Medications Medication Sig Suzan Goring misc 1 Each by Does Not Apply route daily. Please provide walker with wheels and stoppers  Carboxymethylcellulose-Glycern (REFRESH OPTIVE) 0.5-0.9 % drop Apply  to eye.  melatonin 5 mg cap capsule Take 1 Cap by mouth nightly.  mv,Ca,min-folic acid-vit K1 (ONE-A-DAY WOMEN'S 50 PLUS) 400-20 mcg tab Take 1 Tab by mouth daily.  glimepiride (AMARYL) 2 mg tablet Take 2 mg by mouth every morning.  bisacodyl (DULCOLAX, BISACODYL,) 10 mg suppository Insert 10 mg into rectum daily as needed (constipation).  amLODIPine (NORVASC) 5 mg tablet TAKE 1 TABLET BY MOUTH ONCE DAILY FOR HTN  
 aspirin delayed-release 81 mg tablet TAKE 1 TABLET BY MOUTH ONCE DAILY FOR HEART HEALTH  
 lovastatin (MEVACOR) 20 mg tablet TAKE 1 TABLET BY MOUTH DAILY IN THE EVENING FOR HYPERLIPEDEMIA  EASY TOUCH INSULIN SAFETY SYR 0.5 mL 30 gauge x 5/16\" syrg Use subcutaneously as directed with insulin.  SOD PHOS,M-B/NA PHOS,DI-BA (FLEET ENEMA RE) Insert 1 Dose into rectum daily as needed (Constipation (if no BM, notify MD)).  polyethylene glycol (MIRALAX) 17 gram packet Take 17 g by mouth two (2) times daily as needed (Constipation).  TRUEPLUS LANCETS 30 gauge misc Use to check blood glucose up to twice daily DX:e11.9 No current facility-administered medications for this visit. Allergies Allergen Reactions  Nitrofurantoin Nausea and Vomiting  
 
has Hypoglycemia, Thyromegaly, NIDDM (non-insulin dependent diabetes mellitus), HTN (hypertension), Hyperlipidemia, Fatigue, Thyroid nodule, Mitral valve prolapse, Chronic airway obstruction, not elsewhere classified, Abnormality of gait, Acute upper respiratory infections of unspecified site, Senile osteoporosis, Urinary tract infection, site not specified, Chest pain, Thyrotoxicosis, Pure hypercholesterolemia, History of tachycardia, Diabetes mellitus type 2, controlled ([address] Hyperthyroidism, Urinary tract infection, Hypercholesterolemia, Hypertension, MVP (mitral valve prolapse), Broken shoulder, Osteoarthritis, Incontinence, OAB (overactive bladder), Advance directive discussed with patient, Spondylosis of cervical region without myelopathy or radiculopathy, Nocturia, Hx of subdural hematoma, STEMI (ST elevation myocardial infarction) ([address] Neck pain, Type 2 diabetes mellitus with nephropathy ([address] Overactive bladder, and Controlled type 2 diabetes mellitus without complication, with long-term current use of insulin ([address] on their problem list. 
Past Surgical History:  
Procedure Laterality Date  HX KNEE REPLACEMENT  left  HX OPEN REDUCTION INTERNAL FIXATION Left   
 patellar fracture  HX OTHER SURGICAL Right   
 shoulder surgery  NM XCAPSL CTRC RMVL INSJ IO LENS PROSTH CPLX WO ECP  [id number]  
 bilateral  
 
Relationships Social connections  Talks on phone: Not on file  Gets together: Not on file  Attends Congregational service: Not on file  Active member of club or organization: Not on file  Attends meetings of clubs or organizations: Not on file  Relationship status: Not on file  
 
family history includes Alcohol abuse in her father and son; Arthritis-rheumatoid in her sister; Cancer in her son; Diabetes in her brother, mother, and son; Hypertension in her son; Other in her brother. ROS Objective: There were no vitals taken for this visit. General: alert, cooperative, no distress Mental  status: normal mood, behavior, speech, dress, motor activity, and thought processes, able to follow commands HENT: NCAT. Pupils are equal.  Eyelids normal.  No obvious oral lesions are noted. Neck: no visualized mass Musculoskeletal: Able to move the arms fairly well and kicking her legs. Resp: no respiratory distress. Adequate excursion. Neuro: no gross deficits . Skin:  The patient showed us the area on the right breast with her family member present. There is no discoloration. There is no puckering or dimpling. There is no obvious peau d'orange. She has no pain at this time even to pressure by herself. .No discoloration or lesions of concern on other Personal visible areas. Psychiatric: normal affect, consistent with stated mood, no evidence of hallucinations. Results for orders placed or performed in visit on 01/08/20 AMB POC URINALYSIS DIP STICK MANUAL W/O MICRO Result Value Ref Range Color (UA POC) Yellow Clarity (UA POC) Clear Glucose (UA POC) Negative Negative Bilirubin (UA POC) Negative Negative Ketones (UA POC) Negative Negative Specific gravity (UA POC) 1.015 1.001 - 1.035 Blood (UA POC) Negative Negative pH (UA POC) 6.0 4.6 - 8.0 Protein (UA POC) Negative Negative Urobilinogen (UA POC) 0.2 mg/dL 0.2 - 1 Nitrites (UA POC) Negative Negative Leukocyte esterase (UA POC) Negative Negative No results found for any visits on 06/12/20. We discussed the expected course, resolution and complications of the diagnosis(es) in detail. Medication risks, benefits, costs, interactions, and alternatives were discussed as indicated.   I advised her to contact the office if her condition worsens, changes or fails to improve as anticipated. She expressed understanding with the diagnosis(es) and plan. Paula Powell is a 80 y.o. female being evaluated by a video visit encounter for concerns as above. A caregiver was present when appropriate. Due to this being a TeleHealth encounter (During [facility] public health emergency), evaluation of the following organ systems was limited: Vitals/Constitutional/EENT/Resp/CV/GI//MS/Neuro/Skin/Heme-Lymph-Imm. Pursuant to the emergency declaration under the [facility], 1135 waiver authority and the [employer] Act, this Virtual  Visit was conducted, with patient's (and/or legal guardian's) consent, to reduce the patient's risk of exposure to COVID-19 and provide necessary medical care. Gunnar Nguyễn MD 
 
This note was done with the assistance of dragon speech software. Some inadvertent errors or omissions may be present

## 2020-10-10 NOTE — PROGRESS NOTES
Consent: Nancy Fleming, who was seen by synchronous (real-time) audio technology, and/or her healthcare decision maker, is aware that this patient-initiated, Telehealth audio encounter on 10/12/2020 is a billable service, with coverage as determined by her insurance carrier. She is aware that she may receive a bill and has provided verbal consent to proceed: Yes. The patient was an extended care facility and I was at the offices of the [address] [place] [address] a nurse helped with the procedures. The patient is quite hard of hearing. ASSESSMENT and PLAN 
  ICD-10-CM ICD-9-CM 1. Essential hypertension  I10 401.9 CBC WITH AUTOMATED DIFF This is a chronic problem. It is presently well controlled. We will continue the same treatment. 2. Type 2 diabetes mellitus with nephropathy (HCC)  E11.21 250.40 CBC WITH AUTOMATED DIFF  
  870.99 METABOLIC PANEL, BASIC HEMOGLOBIN A1C WITH EAG  
   MICROALBUMIN, UR, RAND W/ MICROALB/CREAT RATIO Chronic problem, follow-up labs have been ordered, adjust meds accordingly. 3. Hx of subdural hematoma  Z86.79 V12.59 No headache or loss of mobility noted 4. Dysuria  R30.0 788. 1 URINALYSIS W/MICROSCOPIC Urinalysis is ordered. Will trial empirically treat with Bactrim. Culture if not improved. 5. Macular degeneration, unspecified laterality, unspecified type  H35.30 362.50 REFERRAL TO OPHTHALMOLOGY Refer back to the ophthalmologist.  Continue present regimen until then. 6. Pure hypercholesterolemia  E78.00 272.0 LIPID PANEL Follow-up labs ordered. Follow-up and Dispositions · Return in about 3 months (around 1/12/2021) for Follow up on illness. lab results and schedule of future lab studies reviewed with patient Prior encounter 10/10/20 There were no encounter diagnoses. No orders of the defined types were placed in this encounter. Health Maintenance Due Topic Date Due  Foot Exam Q1  04/24/1936  Shingrix Vaccine Age 50> (1 of 2) 04/24/1976  Bone Densitometry (Dexa) Screening  04/24/1991  Pneumococcal 65+ years (1 of 1 - PPSV23) 04/24/1991  MICROALBUMIN Q1  01/19/2017  Lipid Screen  05/02/2019  GLAUCOMA SCREENING Q2Y  08/30/2020  Eye Exam Retinal or Dilated  08/30/2020  Flu Vaccine (1) 09/01/2020  
 
712 Subjective:  
Elysia Radn is a 80 y.o. female being seen in follow-up. She has a history of essential hypertension,Controlled type 2 diabetes mellitus, difficulty sleeping, prior history of urinary symptoms, prior history of subdural hematoma and a history of memory loss. She has other medical problems. The patient was seen in June for right breast pain which was transient. After discussion with the patient and her family decisions were made to observe it for now. Her blood pressure was well controlled as was her diabetes. Plans are to discuss what health maintenance tests she wishes to handle. Breast pain On the last visit the patient had complained of breast pain. This symptom has since resolved and she denies any discharge. Poor vision She has macular degeneration, and has seen the ophthalmologist.  She was prescribed PreserVision but the patient states that her vision is worsening. No eye pain or redness is admitted to. Dysuria She is having burning on urination which is been present 1 week. No fever chills have been documented at the nursing home. No hematuria has been noted. The patient has been drinking plenty of water but is not improving. She has a prior history of UTIs and states that this feels the same. Hypertension The patient has had no problem with the medication. The patient has no headaches, visual changes, chest pain or pressure,dyspnea, orthopnea, abdominal pain, dysuria, weakness, or paresthesias. BP Readings from Last 3 Encounters:  
01/08/20 119/56  
08/19/19 132/78  
06/19/19 120/70 Lab Results Component Value Date/Time Sodium 136 08/19/2019 01:28 PM  
 Potassium 4.3 08/19/2019 01:28 PM  
 Chloride 102 08/19/2019 01:28 PM  
 CO2 26 08/19/2019 01:28 PM  
 Anion gap 8 08/19/2019 01:28 PM  
 Glucose 136 (H) 08/19/2019 01:28 PM  
 BUN 36 (H) 08/19/2019 01:28 PM  
 Creatinine 1.06 08/19/2019 01:28 PM  
 BUN/Creatinine ratio 34 (H) 08/19/2019 01:28 PM  
 GFR est AA 59 (L) 08/19/2019 01:28 PM  
 GFR est non-AA 48 (L) 08/19/2019 01:28 PM  
 Calcium 9.0 08/19/2019 01:28 PM  
 
 
  
Key CAD CHF Meds   
    
  
 amLODIPine (NORVASC) 5 mg tablet TAKE 1 TABLET BY MOUTH ONCE DAILY FOR HTN  
 aspirin delayed-release 81 mg tablet TAKE 1 TABLET BY MOUTH ONCE DAILY FOR HEART HEALTH  
 lovastatin (MEVACOR) 20 mg tablet TAKE 1 TABLET BY MOUTH DAILY IN THE EVENING FOR HYPERLIPEDEMIA Diabetes mellitus The patient denies polyuria, polydipsia, or polyphagia. There has been no problem with the medications. Wt Readings from Last 3 Encounters:  
01/08/20 98 lb (44.5 kg) 08/19/19 103 lb 6.4 oz (46.9 kg) 06/19/19 104 lb (47.2 kg)  
 
body mass index is unknown because there is no height or weight on file. Lab Results Component Value Date/Time Hemoglobin A1c 6.8 (H) 08/19/2019 01:28 PM  
 Hemoglobin A1c, External 8.3 07/30/2015 Lab Results Component Value Date/Time Glucose 136 (H) 08/19/2019 01:28 PM  
 Glucose (POC) 290 (H) 05/08/2017 11:27 AM  
 
Key Antihyperglycemic Medications   
    
  
 glimepiride (AMARYL) 2 mg tablet Take 2 mg by mouth every morning. Current Outpatient Medications Medication Sig Derrick Salm misc 1 Each by Does Not Apply route daily. Please provide walker with wheels and stoppers  Carboxymethylcellulose-Glycern (REFRESH OPTIVE) 0.5-0.9 % drop Apply  to eye.  melatonin 5 mg cap capsule Take 1 Cap by mouth nightly.  mv,Ca,min-folic acid-vit K1 (ONE-A-DAY WOMEN'S 50 PLUS) 400-20 mcg tab Take 1 Tab by mouth daily.  glimepiride (AMARYL) 2 mg tablet Take 2 mg by mouth every morning.  bisacodyl (DULCOLAX, BISACODYL,) 10 mg suppository Insert 10 mg into rectum daily as needed (constipation).  amLODIPine (NORVASC) 5 mg tablet TAKE 1 TABLET BY MOUTH ONCE DAILY FOR HTN  
 aspirin delayed-release 81 mg tablet TAKE 1 TABLET BY MOUTH ONCE DAILY FOR HEART HEALTH  
 lovastatin (MEVACOR) 20 mg tablet TAKE 1 TABLET BY MOUTH DAILY IN THE EVENING FOR HYPERLIPEDEMIA  EASY TOUCH INSULIN SAFETY SYR 0.5 mL 30 gauge x 5/16\" syrg Use subcutaneously as directed with insulin.  SOD PHOS,M-B/NA PHOS,DI-BA (FLEET ENEMA RE) Insert 1 Dose into rectum daily as needed (Constipation (if no BM, notify MD)).  polyethylene glycol (MIRALAX) 17 gram packet Take 17 g by mouth two (2) times daily as needed (Constipation).  TRUEPLUS LANCETS 30 gauge misc Use to check blood glucose up to twice daily DX:e11.9 No current facility-administered medications for this visit. Allergies Allergen Reactions  Nitrofurantoin Nausea and Vomiting  
 
has Hypoglycemia, Thyromegaly, NIDDM (non-insulin dependent diabetes mellitus), HTN (hypertension), Hyperlipidemia, Fatigue, Thyroid nodule, Mitral valve prolapse, Chronic airway obstruction, not elsewhere classified, Abnormality of gait, Acute upper respiratory infections of unspecified site, Senile osteoporosis, Urinary tract infection, site not specified, Chest pain, Thyrotoxicosis, Pure hypercholesterolemia, History of tachycardia, Diabetes mellitus type 2, controlled ([address] Hyperthyroidism, Urinary tract infection, Hypercholesterolemia, Hypertension, MVP (mitral valve prolapse), Broken shoulder, Osteoarthritis, Incontinence, OAB (overactive bladder), Advance directive discussed with patient, Spondylosis of cervical region without myelopathy or radiculopathy, Nocturia, Hx of subdural hematoma, STEMI (ST elevation myocardial infarction) ([address] Neck pain, Type 2 diabetes mellitus with nephropathy ([address] Overactive bladder, and Controlled type 2 diabetes mellitus without complication, with long-term current use of insulin ([address] on their problem list. 
Past Surgical History:  
Procedure Laterality Date  HX KNEE REPLACEMENT  left  HX OPEN REDUCTION INTERNAL FIXATION Left   
 patellar fracture  HX OTHER SURGICAL Right   
 shoulder surgery  [place] XCAPSL CTRC RMVL INSJ IO LENS PROSTH CPLX WO ECP  [id number]  
 bilateral  
 
Relationships Social connections  Talks on phone: Not on file  Gets together: Not on file  Attends Congregation service: Not on file  Active member of club or organization: Not on file  Attends meetings of clubs or organizations: Not on file  Relationship status: Not on file  
 
family history includes Alcohol abuse in her father and son; Arthritis-rheumatoid in her sister; Cancer in her son; Diabetes in her brother, mother, and son; Hypertension in her son; Other in her brother. Review of Systems Constitutional: Negative for malaise/fatigue. HENT: Positive for hearing loss. Negative for ear pain and tinnitus. Eyes: Positive for blurred vision. Negative for photophobia, pain, discharge and redness. Respiratory: Negative for cough and sputum production. Cardiovascular: Negative for chest pain and palpitations. Gastrointestinal: Negative for abdominal pain, nausea and vomiting. Genitourinary: Positive for dysuria, frequency and urgency. Negative for flank pain and hematuria. Musculoskeletal: Positive for joint pain ( History of osteoarthritis). Negative for myalgias. Skin: Negative for rash. Neurological: Negative for dizziness. Psychiatric/Behavioral: Negative for depression. Results for orders placed or performed in visit on 01/08/20 AMB POC URINALYSIS DIP STICK MANUAL W/O MICRO Result Value Ref Range Color (UA POC) Yellow Clarity (UA POC) Clear Glucose (UA POC) Negative Negative Bilirubin (UA POC) Negative Negative Ketones (UA POC) Negative Negative Specific gravity (UA POC) 1.015 1.001 - 1.035 Blood (UA POC) Negative Negative pH (UA POC) 6.0 4.6 - 8.0 Protein (UA POC) Negative Negative Urobilinogen (UA POC) 0.2 mg/dL 0.2 - 1 Nitrites (UA POC) Negative Negative Leukocyte esterase (UA POC) Negative Negative We discussed the expected course, resolution and complications of the diagnosis(es) in detail. Medication risks, benefits, costs, interactions, and alternatives were discussed as indicated. I advised her to contact the office if her condition worsens, changes or fails to improve as anticipated. She expressed understanding with the diagnosis(es) and plan. Lonny Cardona is a 80 y.o. female being evaluated by a video visit encounter for concerns as above. A caregiver was present when appropriate. Due to this being a TeleHealth encounter (During [facility] public health emergency), evaluation of the following organ systems was limited: Vitals/Constitutional/EENT/Resp/CV/GI//MS/Neuro/Skin/Heme-Lymph-Imm. Pursuant to the emergency declaration under the [facility], 1135 waiver authority and the [employer] and [employer] Act, this Virtual  Visit was conducted, with patient's (and/or legal guardian's) consent, to reduce the patient's risk of exposure to COVID-19 and provide necessary medical care. Manuel Cyr MD 
 
This note was done with the assistance of dragon speech software. Some inadvertent errors or omissions may be present

## 2020-11-17 NOTE — TELEPHONE ENCOUNTER
Patient called asking to get her lab results. Please call her back at [phone].   Please call the niece at [address] [facility]

## 2020-11-23 NOTE — TELEPHONE ENCOUNTER
Patient called asking for her lab results. She said she  Had blood work done at the nursing facality.   Please call her back at [phone]

## 2020-11-23 NOTE — TELEPHONE ENCOUNTER
TC was made to pt I spoke with Deanna Delgado and she was made aware that DR. Amaya doesn't have pt lab results in no results have been found with [facility] or [facility]. Ms. Lilliana Hopkinsuser was made aware to have the Nursing facility to fax results to our office.

## 2020-11-23 NOTE — TELEPHONE ENCOUNTER
I do not see the lab results. We will be ordered?   I looked in [address] [facility] [address]

## 2021-01-01 ENCOUNTER — TELEPHONE (OUTPATIENT)
Dept: FAMILY MEDICINE CLINIC | Age: 86
End: 2021-01-01

## 2021-01-01 ENCOUNTER — APPOINTMENT (OUTPATIENT)
Dept: GENERAL RADIOLOGY | Age: 86
DRG: 871 | End: 2021-01-01
Attending: EMERGENCY MEDICINE
Payer: MEDICARE

## 2021-01-01 ENCOUNTER — HOSPITAL ENCOUNTER (INPATIENT)
Age: 86
LOS: 3 days | DRG: 871 | End: 2021-02-02
Attending: EMERGENCY MEDICINE | Admitting: HOSPITALIST
Payer: MEDICARE

## 2021-01-01 VITALS
WEIGHT: 98.11 LBS | HEIGHT: 61 IN | OXYGEN SATURATION: 98 % | DIASTOLIC BLOOD PRESSURE: 55 MMHG | RESPIRATION RATE: 18 BRPM | SYSTOLIC BLOOD PRESSURE: 92 MMHG | BODY MASS INDEX: 18.52 KG/M2 | TEMPERATURE: 97.7 F | HEART RATE: 53 BPM

## 2021-01-01 DIAGNOSIS — U07.1 PNEUMONIA DUE TO COVID-19 VIRUS: Primary | ICD-10-CM

## 2021-01-01 DIAGNOSIS — Z51.5 HOSPICE CARE: ICD-10-CM

## 2021-01-01 DIAGNOSIS — J12.82 PNEUMONIA DUE TO COVID-19 VIRUS: Primary | ICD-10-CM

## 2021-01-01 DIAGNOSIS — Z71.89 GOALS OF CARE, COUNSELING/DISCUSSION: ICD-10-CM

## 2021-01-01 DIAGNOSIS — H04.129 DRY EYE: ICD-10-CM

## 2021-01-01 DIAGNOSIS — R53.81 DEBILITY: ICD-10-CM

## 2021-01-01 DIAGNOSIS — R09.02 HYPOXIA: ICD-10-CM

## 2021-01-01 LAB
ADMINISTERED INITIALS, ADMINIT: NORMAL
ALBUMIN SERPL-MCNC: 2.3 G/DL (ref 3.4–5)
ALBUMIN SERPL-MCNC: 2.5 G/DL (ref 3.4–5)
ALBUMIN SERPL-MCNC: 3.2 G/DL (ref 3.4–5)
ALBUMIN/GLOB SERPL: 0.5 {RATIO} (ref 0.8–1.7)
ALBUMIN/GLOB SERPL: 0.6 {RATIO} (ref 0.8–1.7)
ALBUMIN/GLOB SERPL: 0.7 {RATIO} (ref 0.8–1.7)
ALP SERPL-CCNC: 48 U/L (ref 45–117)
ALP SERPL-CCNC: 52 U/L (ref 45–117)
ALP SERPL-CCNC: 63 U/L (ref 45–117)
ALT SERPL-CCNC: 25 U/L (ref 13–56)
ALT SERPL-CCNC: 32 U/L (ref 13–56)
ALT SERPL-CCNC: 37 U/L (ref 13–56)
ANION GAP SERPL CALC-SCNC: 10 MMOL/L (ref 3–18)
ANION GAP SERPL CALC-SCNC: 6 MMOL/L (ref 3–18)
ANION GAP SERPL CALC-SCNC: 6 MMOL/L (ref 3–18)
APPEARANCE UR: CLEAR
APTT PPP: 148.1 SEC (ref 23–36.4)
APTT PPP: 172.6 SEC (ref 23–36.4)
APTT PPP: 29.9 SEC (ref 23–36.4)
AST SERPL-CCNC: 32 U/L (ref 10–38)
AST SERPL-CCNC: 35 U/L (ref 10–38)
AST SERPL-CCNC: 47 U/L (ref 10–38)
ATRIAL RATE: 89 BPM
BACTERIA URNS QL MICRO: NEGATIVE /HPF
BASOPHILS # BLD: 0 K/UL (ref 0–0.1)
BASOPHILS NFR BLD: 0 % (ref 0–2)
BILIRUB SERPL-MCNC: 0.4 MG/DL (ref 0.2–1)
BILIRUB SERPL-MCNC: 0.4 MG/DL (ref 0.2–1)
BILIRUB SERPL-MCNC: 0.5 MG/DL (ref 0.2–1)
BILIRUB UR QL: NEGATIVE
BUN SERPL-MCNC: 41 MG/DL (ref 7–18)
BUN SERPL-MCNC: 46 MG/DL (ref 7–18)
BUN SERPL-MCNC: 75 MG/DL (ref 7–18)
BUN/CREAT SERPL: 47 (ref 12–20)
BUN/CREAT SERPL: 49 (ref 12–20)
BUN/CREAT SERPL: 52 (ref 12–20)
CALCIUM SERPL-MCNC: 8.4 MG/DL (ref 8.5–10.1)
CALCIUM SERPL-MCNC: 8.6 MG/DL (ref 8.5–10.1)
CALCIUM SERPL-MCNC: 9.8 MG/DL (ref 8.5–10.1)
CALCULATED R AXIS, ECG10: -20 DEGREES
CALCULATED T AXIS, ECG11: 172 DEGREES
CHLORIDE SERPL-SCNC: 110 MMOL/L (ref 100–111)
CHLORIDE SERPL-SCNC: 112 MMOL/L (ref 100–111)
CHLORIDE SERPL-SCNC: 98 MMOL/L (ref 100–111)
CK MB CFR SERPL CALC: 4.1 % (ref 0–4)
CK MB SERPL-MCNC: 2.1 NG/ML (ref 5–25)
CK SERPL-CCNC: 51 U/L (ref 26–192)
CO2 SERPL-SCNC: 26 MMOL/L (ref 21–32)
CO2 SERPL-SCNC: 26 MMOL/L (ref 21–32)
CO2 SERPL-SCNC: 28 MMOL/L (ref 21–32)
COLOR UR: YELLOW
COVID-19 RAPID TEST, COVR: DETECTED
CREAT SERPL-MCNC: 0.87 MG/DL (ref 0.6–1.3)
CREAT SERPL-MCNC: 0.93 MG/DL (ref 0.6–1.3)
CREAT SERPL-MCNC: 1.44 MG/DL (ref 0.6–1.3)
CRP SERPL-MCNC: 5.1 MG/DL (ref 0–0.3)
CRP SERPL-MCNC: 5.3 MG/DL (ref 0–0.3)
D DIMER PPP FEU-MCNC: 1.82 UG/ML(FEU)
D DIMER PPP FEU-MCNC: 2.45 UG/ML(FEU)
D50 ADMINISTERED, D50ADM: 0 ML
D50 ORDER, D50ORD: 0 ML
DIAGNOSIS, 93000: NORMAL
DIFFERENTIAL METHOD BLD: ABNORMAL
EOSINOPHIL # BLD: 0 K/UL (ref 0–0.4)
EOSINOPHIL NFR BLD: 0 % (ref 0–5)
EPITH CASTS URNS QL MICRO: NORMAL /LPF (ref 0–5)
ERYTHROCYTE [DISTWIDTH] IN BLOOD BY AUTOMATED COUNT: 13.2 % (ref 11.6–14.5)
ERYTHROCYTE [DISTWIDTH] IN BLOOD BY AUTOMATED COUNT: 13.5 % (ref 11.6–14.5)
ERYTHROCYTE [DISTWIDTH] IN BLOOD BY AUTOMATED COUNT: 13.5 % (ref 11.6–14.5)
EST. AVERAGE GLUCOSE BLD GHB EST-MCNC: 226 MG/DL
GLOBULIN SER CALC-MCNC: 4.2 G/DL (ref 2–4)
GLOBULIN SER CALC-MCNC: 4.2 G/DL (ref 2–4)
GLOBULIN SER CALC-MCNC: 4.8 G/DL (ref 2–4)
GLUCOSE BLD STRIP.AUTO-MCNC: 113 MG/DL (ref 70–110)
GLUCOSE BLD STRIP.AUTO-MCNC: 141 MG/DL (ref 70–110)
GLUCOSE BLD STRIP.AUTO-MCNC: 176 MG/DL (ref 70–110)
GLUCOSE BLD STRIP.AUTO-MCNC: 178 MG/DL (ref 70–110)
GLUCOSE BLD STRIP.AUTO-MCNC: 190 MG/DL (ref 70–110)
GLUCOSE BLD STRIP.AUTO-MCNC: 193 MG/DL (ref 70–110)
GLUCOSE BLD STRIP.AUTO-MCNC: 237 MG/DL (ref 70–110)
GLUCOSE BLD STRIP.AUTO-MCNC: 268 MG/DL (ref 70–110)
GLUCOSE BLD STRIP.AUTO-MCNC: 279 MG/DL (ref 70–110)
GLUCOSE BLD STRIP.AUTO-MCNC: 358 MG/DL (ref 70–110)
GLUCOSE BLD STRIP.AUTO-MCNC: 372 MG/DL (ref 70–110)
GLUCOSE BLD STRIP.AUTO-MCNC: 488 MG/DL (ref 70–110)
GLUCOSE BLD STRIP.AUTO-MCNC: 76 MG/DL (ref 70–110)
GLUCOSE SERPL-MCNC: 254 MG/DL (ref 74–99)
GLUCOSE SERPL-MCNC: 550 MG/DL (ref 74–99)
GLUCOSE SERPL-MCNC: 91 MG/DL (ref 74–99)
GLUCOSE UR STRIP.AUTO-MCNC: >1000 MG/DL
GLUCOSE, GLC: 176 MG/DL
GLUCOSE, GLC: 268 MG/DL
GLUCOSE, GLC: 279 MG/DL
GLUCOSE, GLC: 358 MG/DL
GLUCOSE, GLC: 372 MG/DL
GLUCOSE, GLC: 488 MG/DL
HBA1C MFR BLD: 9.5 % (ref 4.2–5.6)
HCT VFR BLD AUTO: 40.8 % (ref 35–45)
HCT VFR BLD AUTO: 41 % (ref 35–45)
HCT VFR BLD AUTO: 42.8 % (ref 35–45)
HGB BLD-MCNC: 14.5 G/DL (ref 12–16)
HGB BLD-MCNC: 14.6 G/DL (ref 12–16)
HGB BLD-MCNC: 15.1 G/DL (ref 12–16)
HGB UR QL STRIP: NEGATIVE
HIGH TARGET, HITG: 180 MG/DL
INSULIN ADMINSTERED, INSADM: 1.2 UNITS/HOUR
INSULIN ADMINSTERED, INSADM: 2.1 UNITS/HOUR
INSULIN ADMINSTERED, INSADM: 3.1 UNITS/HOUR
INSULIN ADMINSTERED, INSADM: 4.4 UNITS/HOUR
INSULIN ADMINSTERED, INSADM: 6 UNITS/HOUR
INSULIN ADMINSTERED, INSADM: 8.6 UNITS/HOUR
INSULIN ORDER, INSORD: 1.2 UNITS/HOUR
INSULIN ORDER, INSORD: 2.1 UNITS/HOUR
INSULIN ORDER, INSORD: 3.1 UNITS/HOUR
INSULIN ORDER, INSORD: 4.4 UNITS/HOUR
INSULIN ORDER, INSORD: 6 UNITS/HOUR
INSULIN ORDER, INSORD: 8.6 UNITS/HOUR
KETONES UR QL STRIP.AUTO: 15 MG/DL
LACTATE BLD-SCNC: 1.55 MMOL/L (ref 0.4–2)
LACTATE BLD-SCNC: 1.68 MMOL/L (ref 0.4–2)
LEUKOCYTE ESTERASE UR QL STRIP.AUTO: NEGATIVE
LOW TARGET, LOT: 140 MG/DL
LYMPHOCYTES # BLD: 0.3 K/UL (ref 0.9–3.6)
LYMPHOCYTES # BLD: 0.3 K/UL (ref 0.9–3.6)
LYMPHOCYTES # BLD: 0.5 K/UL (ref 0.9–3.6)
LYMPHOCYTES NFR BLD: 4 % (ref 21–52)
LYMPHOCYTES NFR BLD: 5 % (ref 21–52)
LYMPHOCYTES NFR BLD: 5 % (ref 21–52)
MAGNESIUM SERPL-MCNC: 1.8 MG/DL (ref 1.6–2.6)
MAGNESIUM SERPL-MCNC: 1.9 MG/DL (ref 1.6–2.6)
MCH RBC QN AUTO: 30.8 PG (ref 24–34)
MCH RBC QN AUTO: 31.8 PG (ref 24–34)
MCH RBC QN AUTO: 32.6 PG (ref 24–34)
MCHC RBC AUTO-ENTMCNC: 35.3 G/DL (ref 31–37)
MCHC RBC AUTO-ENTMCNC: 35.4 G/DL (ref 31–37)
MCHC RBC AUTO-ENTMCNC: 35.8 G/DL (ref 31–37)
MCV RBC AUTO: 87.2 FL (ref 74–97)
MCV RBC AUTO: 89.9 FL (ref 74–97)
MCV RBC AUTO: 91.1 FL (ref 74–97)
MINUTES UNTIL NEXT BG, NBG: 60 MIN
MONOCYTES # BLD: 0.8 K/UL (ref 0.05–1.2)
MONOCYTES # BLD: 0.8 K/UL (ref 0.05–1.2)
MONOCYTES # BLD: 1 K/UL (ref 0.05–1.2)
MONOCYTES NFR BLD: 11 % (ref 3–10)
MONOCYTES NFR BLD: 12 % (ref 3–10)
MONOCYTES NFR BLD: 8 % (ref 3–10)
MULTIPLIER, MUL: 0.01
MULTIPLIER, MUL: 0.02
NEUTS SEG # BLD: 10.9 K/UL (ref 1.8–8)
NEUTS SEG # BLD: 5.6 K/UL (ref 1.8–8)
NEUTS SEG # BLD: 5.8 K/UL (ref 1.8–8)
NEUTS SEG NFR BLD: 83 % (ref 40–73)
NEUTS SEG NFR BLD: 84 % (ref 40–73)
NEUTS SEG NFR BLD: 88 % (ref 40–73)
NITRITE UR QL STRIP.AUTO: NEGATIVE
ORDER INITIALS, ORDINIT: NORMAL
PH UR STRIP: 5.5 [PH] (ref 5–8)
PLATELET # BLD AUTO: 207 K/UL (ref 135–420)
PLATELET # BLD AUTO: 210 K/UL (ref 135–420)
PLATELET # BLD AUTO: 251 K/UL (ref 135–420)
PMV BLD AUTO: 10.5 FL (ref 9.2–11.8)
PMV BLD AUTO: 10.8 FL (ref 9.2–11.8)
PMV BLD AUTO: 10.8 FL (ref 9.2–11.8)
POTASSIUM SERPL-SCNC: 3.1 MMOL/L (ref 3.5–5.5)
POTASSIUM SERPL-SCNC: 4 MMOL/L (ref 3.5–5.5)
POTASSIUM SERPL-SCNC: 4.5 MMOL/L (ref 3.5–5.5)
PROCALCITONIN SERPL-MCNC: 0.15 NG/ML
PROT SERPL-MCNC: 6.5 G/DL (ref 6.4–8.2)
PROT SERPL-MCNC: 6.7 G/DL (ref 6.4–8.2)
PROT SERPL-MCNC: 8 G/DL (ref 6.4–8.2)
PROT UR STRIP-MCNC: 30 MG/DL
Q-T INTERVAL, ECG07: 328 MS
QRS DURATION, ECG06: 78 MS
QTC CALCULATION (BEZET), ECG08: 437 MS
RBC # BLD AUTO: 4.48 M/UL (ref 4.2–5.3)
RBC # BLD AUTO: 4.56 M/UL (ref 4.2–5.3)
RBC # BLD AUTO: 4.91 M/UL (ref 4.2–5.3)
RBC #/AREA URNS HPF: NORMAL /HPF (ref 0–5)
SARS-COV-2, COV2: NORMAL
SARS-COV-2, COV2: NORMAL
SARS-COV-2, COV2NT: DETECTED
SODIUM SERPL-SCNC: 134 MMOL/L (ref 136–145)
SODIUM SERPL-SCNC: 142 MMOL/L (ref 136–145)
SODIUM SERPL-SCNC: 146 MMOL/L (ref 136–145)
SOURCE, COVRS: ABNORMAL
SP GR UR REFRACTOMETRY: 1.03 (ref 1–1.03)
TROPONIN I SERPL-MCNC: 0.02 NG/ML (ref 0–0.04)
UROBILINOGEN UR QL STRIP.AUTO: 0.2 EU/DL (ref 0.2–1)
VENTRICULAR RATE, ECG03: 107 BPM
WBC # BLD AUTO: 12.4 K/UL (ref 4.6–13.2)
WBC # BLD AUTO: 6.7 K/UL (ref 4.6–13.2)
WBC # BLD AUTO: 6.9 K/UL (ref 4.6–13.2)
WBC URNS QL MICRO: NEGATIVE /HPF (ref 0–4)

## 2021-01-01 PROCEDURE — XW033E5 INTRODUCTION OF REMDESIVIR ANTI-INFECTIVE INTO PERIPHERAL VEIN, PERCUTANEOUS APPROACH, NEW TECHNOLOGY GROUP 5: ICD-10-PCS | Performed by: EMERGENCY MEDICINE

## 2021-01-01 PROCEDURE — 74011250636 HC RX REV CODE- 250/636: Performed by: NURSE PRACTITIONER

## 2021-01-01 PROCEDURE — 99233 SBSQ HOSP IP/OBS HIGH 50: CPT | Performed by: INTERNAL MEDICINE

## 2021-01-01 PROCEDURE — 80053 COMPREHEN METABOLIC PANEL: CPT

## 2021-01-01 PROCEDURE — 94762 N-INVAS EAR/PLS OXIMTRY CONT: CPT

## 2021-01-01 PROCEDURE — 74011636637 HC RX REV CODE- 636/637: Performed by: EMERGENCY MEDICINE

## 2021-01-01 PROCEDURE — 65660000000 HC RM CCU STEPDOWN

## 2021-01-01 PROCEDURE — 99222 1ST HOSP IP/OBS MODERATE 55: CPT | Performed by: INTERNAL MEDICINE

## 2021-01-01 PROCEDURE — 85025 COMPLETE CBC W/AUTO DIFF WBC: CPT

## 2021-01-01 PROCEDURE — 74011250637 HC RX REV CODE- 250/637: Performed by: EMERGENCY MEDICINE

## 2021-01-01 PROCEDURE — 99238 HOSP IP/OBS DSCHRG MGMT 30/<: CPT | Performed by: HOSPITALIST

## 2021-01-01 PROCEDURE — 74011250637 HC RX REV CODE- 250/637: Performed by: NURSE PRACTITIONER

## 2021-01-01 PROCEDURE — 87635 SARS-COV-2 COVID-19 AMP PRB: CPT

## 2021-01-01 PROCEDURE — 77010033711 HC HIGH FLOW OXYGEN

## 2021-01-01 PROCEDURE — 85379 FIBRIN DEGRADATION QUANT: CPT

## 2021-01-01 PROCEDURE — 74011250636 HC RX REV CODE- 250/636: Performed by: EMERGENCY MEDICINE

## 2021-01-01 PROCEDURE — 93005 ELECTROCARDIOGRAM TRACING: CPT

## 2021-01-01 PROCEDURE — 85730 THROMBOPLASTIN TIME PARTIAL: CPT

## 2021-01-01 PROCEDURE — 82962 GLUCOSE BLOOD TEST: CPT

## 2021-01-01 PROCEDURE — 99232 SBSQ HOSP IP/OBS MODERATE 35: CPT | Performed by: NURSE PRACTITIONER

## 2021-01-01 PROCEDURE — 84145 PROCALCITONIN (PCT): CPT

## 2021-01-01 PROCEDURE — 99223 1ST HOSP IP/OBS HIGH 75: CPT | Performed by: NURSE PRACTITIONER

## 2021-01-01 PROCEDURE — 2709999900 HC NON-CHARGEABLE SUPPLY

## 2021-01-01 PROCEDURE — U0003 INFECTIOUS AGENT DETECTION BY NUCLEIC ACID (DNA OR RNA); SEVERE ACUTE RESPIRATORY SYNDROME CORONAVIRUS 2 (SARS-COV-2) (CORONAVIRUS DISEASE [COVID-19]), AMPLIFIED PROBE TECHNIQUE, MAKING USE OF HIGH THROUGHPUT TECHNOLOGIES AS DESCRIBED BY CMS-2020-01-R: HCPCS

## 2021-01-01 PROCEDURE — 99231 SBSQ HOSP IP/OBS SF/LOW 25: CPT | Performed by: HOSPITALIST

## 2021-01-01 PROCEDURE — 86140 C-REACTIVE PROTEIN: CPT

## 2021-01-01 PROCEDURE — 87040 BLOOD CULTURE FOR BACTERIA: CPT

## 2021-01-01 PROCEDURE — 74011000250 HC RX REV CODE- 250: Performed by: EMERGENCY MEDICINE

## 2021-01-01 PROCEDURE — 96375 TX/PRO/DX INJ NEW DRUG ADDON: CPT

## 2021-01-01 PROCEDURE — 83605 ASSAY OF LACTIC ACID: CPT

## 2021-01-01 PROCEDURE — 81001 URINALYSIS AUTO W/SCOPE: CPT

## 2021-01-01 PROCEDURE — 82553 CREATINE MB FRACTION: CPT

## 2021-01-01 PROCEDURE — 92610 EVALUATE SWALLOWING FUNCTION: CPT

## 2021-01-01 PROCEDURE — 74011000258 HC RX REV CODE- 258: Performed by: EMERGENCY MEDICINE

## 2021-01-01 PROCEDURE — 71045 X-RAY EXAM CHEST 1 VIEW: CPT

## 2021-01-01 PROCEDURE — 83735 ASSAY OF MAGNESIUM: CPT

## 2021-01-01 PROCEDURE — 99285 EMERGENCY DEPT VISIT HI MDM: CPT

## 2021-01-01 PROCEDURE — 96365 THER/PROPH/DIAG IV INF INIT: CPT

## 2021-01-01 PROCEDURE — 76450000000

## 2021-01-01 PROCEDURE — 65270000029 HC RM PRIVATE

## 2021-01-01 PROCEDURE — 99223 1ST HOSP IP/OBS HIGH 75: CPT | Performed by: EMERGENCY MEDICINE

## 2021-01-01 PROCEDURE — 83036 HEMOGLOBIN GLYCOSYLATED A1C: CPT

## 2021-01-01 RX ORDER — ASCORBIC ACID 250 MG
250 TABLET ORAL 2 TIMES DAILY
Status: DISCONTINUED | OUTPATIENT
Start: 2021-01-01 | End: 2021-01-01 | Stop reason: HOSPADM

## 2021-01-01 RX ORDER — HEPARIN SODIUM 10000 [USP'U]/100ML
18-36 INJECTION, SOLUTION INTRAVENOUS
Status: DISCONTINUED | OUTPATIENT
Start: 2021-01-01 | End: 2021-01-01

## 2021-01-01 RX ORDER — MORPHINE SULFATE 100 MG/5ML
5 SOLUTION ORAL
Qty: 3 SYRINGE | Refills: 0 | Status: SHIPPED | OUTPATIENT
Start: 2021-01-01 | End: 2021-01-01

## 2021-01-01 RX ORDER — ONDANSETRON 2 MG/ML
4 INJECTION INTRAMUSCULAR; INTRAVENOUS
Status: COMPLETED | OUTPATIENT
Start: 2021-01-01 | End: 2021-01-01

## 2021-01-01 RX ORDER — AMLODIPINE BESYLATE 5 MG/1
5 TABLET ORAL DAILY
Status: DISCONTINUED | OUTPATIENT
Start: 2021-01-01 | End: 2021-01-01 | Stop reason: HOSPADM

## 2021-01-01 RX ORDER — MELATONIN
2000 DAILY
Status: DISCONTINUED | OUTPATIENT
Start: 2021-01-01 | End: 2021-01-01 | Stop reason: HOSPADM

## 2021-01-01 RX ORDER — DEXAMETHASONE SODIUM PHOSPHATE 4 MG/ML
6 INJECTION, SOLUTION INTRA-ARTICULAR; INTRALESIONAL; INTRAMUSCULAR; INTRAVENOUS; SOFT TISSUE EVERY 24 HOURS
Status: DISCONTINUED | OUTPATIENT
Start: 2021-01-01 | End: 2021-01-01

## 2021-01-01 RX ORDER — ATORVASTATIN CALCIUM 10 MG/1
10 TABLET, FILM COATED ORAL
Status: DISCONTINUED | OUTPATIENT
Start: 2021-01-01 | End: 2021-01-01

## 2021-01-01 RX ORDER — MORPHINE SULFATE 100 MG/5ML
5 SOLUTION ORAL
Status: DISCONTINUED | OUTPATIENT
Start: 2021-01-01 | End: 2021-01-01

## 2021-01-01 RX ORDER — HEPARIN SODIUM 5000 [USP'U]/ML
5000 INJECTION, SOLUTION INTRAVENOUS; SUBCUTANEOUS EVERY 8 HOURS
Status: DISCONTINUED | OUTPATIENT
Start: 2021-01-01 | End: 2021-01-01

## 2021-01-01 RX ORDER — SODIUM CHLORIDE 9 MG/ML
60 INJECTION, SOLUTION INTRAVENOUS CONTINUOUS
Status: DISPENSED | OUTPATIENT
Start: 2021-01-01 | End: 2021-01-01

## 2021-01-01 RX ORDER — ACETAMINOPHEN 650 MG/1
650 SUPPOSITORY RECTAL
Status: DISCONTINUED | OUTPATIENT
Start: 2021-01-01 | End: 2021-01-01 | Stop reason: HOSPADM

## 2021-01-01 RX ORDER — CALCIUM CARB/MAGNESIUM CARB 311-232MG
2.5 TABLET ORAL
Status: DISCONTINUED | OUTPATIENT
Start: 2021-01-01 | End: 2021-01-01

## 2021-01-01 RX ORDER — INSULIN GLARGINE 100 [IU]/ML
10 INJECTION, SOLUTION SUBCUTANEOUS
Status: DISCONTINUED | OUTPATIENT
Start: 2021-01-01 | End: 2021-01-01

## 2021-01-01 RX ORDER — LORAZEPAM 2 MG/ML
0.5 CONCENTRATE ORAL
Qty: 30 ML | Refills: 0 | Status: SHIPPED | OUTPATIENT
Start: 2021-01-01

## 2021-01-01 RX ORDER — DEXTROSE 50 % IN WATER (D50W) INTRAVENOUS SYRINGE
25-50 AS NEEDED
Status: DISCONTINUED | OUTPATIENT
Start: 2021-01-01 | End: 2021-01-01

## 2021-01-01 RX ORDER — MAGNESIUM SULFATE 100 %
4 CRYSTALS MISCELLANEOUS AS NEEDED
Status: DISCONTINUED | OUTPATIENT
Start: 2021-01-01 | End: 2021-01-01

## 2021-01-01 RX ORDER — CALCIUM CARB/MAGNESIUM CARB 311-232MG
5 TABLET ORAL
Status: DISCONTINUED | OUTPATIENT
Start: 2021-01-01 | End: 2021-01-01 | Stop reason: HOSPADM

## 2021-01-01 RX ORDER — POLYETHYLENE GLYCOL 3350 17 G/17G
17 POWDER, FOR SOLUTION ORAL DAILY PRN
Status: DISCONTINUED | OUTPATIENT
Start: 2021-01-01 | End: 2021-01-01 | Stop reason: HOSPADM

## 2021-01-01 RX ORDER — DEXAMETHASONE SODIUM PHOSPHATE 4 MG/ML
6 INJECTION, SOLUTION INTRA-ARTICULAR; INTRALESIONAL; INTRAMUSCULAR; INTRAVENOUS; SOFT TISSUE
Status: COMPLETED | OUTPATIENT
Start: 2021-01-01 | End: 2021-01-01

## 2021-01-01 RX ORDER — SODIUM CHLORIDE 0.9 % (FLUSH) 0.9 %
5-40 SYRINGE (ML) INJECTION EVERY 8 HOURS
Status: DISCONTINUED | OUTPATIENT
Start: 2021-01-01 | End: 2021-01-01 | Stop reason: HOSPADM

## 2021-01-01 RX ORDER — INSULIN LISPRO 100 [IU]/ML
INJECTION, SOLUTION INTRAVENOUS; SUBCUTANEOUS
Status: DISCONTINUED | OUTPATIENT
Start: 2021-01-01 | End: 2021-01-01

## 2021-01-01 RX ORDER — SODIUM CHLORIDE 0.9 % (FLUSH) 0.9 %
5-40 SYRINGE (ML) INJECTION AS NEEDED
Status: DISCONTINUED | OUTPATIENT
Start: 2021-01-01 | End: 2021-01-01 | Stop reason: HOSPADM

## 2021-01-01 RX ORDER — PROMETHAZINE HYDROCHLORIDE 25 MG/1
12.5 TABLET ORAL
Status: DISCONTINUED | OUTPATIENT
Start: 2021-01-01 | End: 2021-01-01 | Stop reason: HOSPADM

## 2021-01-01 RX ORDER — ASPIRIN 81 MG/1
81 TABLET ORAL DAILY
Status: DISCONTINUED | OUTPATIENT
Start: 2021-01-01 | End: 2021-01-01

## 2021-01-01 RX ORDER — SODIUM CHLORIDE 0.9 % (FLUSH) 0.9 %
5-10 SYRINGE (ML) INJECTION AS NEEDED
Status: DISCONTINUED | OUTPATIENT
Start: 2021-01-01 | End: 2021-01-01 | Stop reason: HOSPADM

## 2021-01-01 RX ORDER — ALBUTEROL SULFATE 90 UG/1
2 AEROSOL, METERED RESPIRATORY (INHALATION)
Status: DISCONTINUED | OUTPATIENT
Start: 2021-01-01 | End: 2021-01-01 | Stop reason: HOSPADM

## 2021-01-01 RX ORDER — MORPHINE SULFATE 100 MG/5ML
5 SOLUTION ORAL
Qty: 30 ML | Refills: 0 | Status: SHIPPED | OUTPATIENT
Start: 2021-01-01 | End: 2021-02-07

## 2021-01-01 RX ORDER — ZINC SULFATE 50(220)MG
1 CAPSULE ORAL DAILY
Status: DISCONTINUED | OUTPATIENT
Start: 2021-01-01 | End: 2021-01-01 | Stop reason: HOSPADM

## 2021-01-01 RX ORDER — MORPHINE SULFATE 100 MG/5ML
5 SOLUTION ORAL
Status: DISCONTINUED | OUTPATIENT
Start: 2021-01-01 | End: 2021-01-01 | Stop reason: HOSPADM

## 2021-01-01 RX ORDER — ACETAMINOPHEN 325 MG/1
650 TABLET ORAL
Status: DISCONTINUED | OUTPATIENT
Start: 2021-01-01 | End: 2021-01-01 | Stop reason: HOSPADM

## 2021-01-01 RX ORDER — CARBOXYMETHYLCELLULOSE SODIUM AND GLYCERIN 5; 9 MG/ML; MG/ML
1 SOLUTION/ DROPS OPHTHALMIC
Qty: 10 ML | Refills: 0 | Status: SHIPPED | OUTPATIENT
Start: 2021-01-01 | End: 2021-01-01 | Stop reason: SDUPTHER

## 2021-01-01 RX ORDER — SODIUM CHLORIDE 9 MG/ML
60 INJECTION, SOLUTION INTRAVENOUS CONTINUOUS
Status: DISCONTINUED | OUTPATIENT
Start: 2021-01-01 | End: 2021-01-01

## 2021-01-01 RX ORDER — ONDANSETRON 2 MG/ML
4 INJECTION INTRAMUSCULAR; INTRAVENOUS
Status: DISCONTINUED | OUTPATIENT
Start: 2021-01-01 | End: 2021-01-01 | Stop reason: HOSPADM

## 2021-01-01 RX ORDER — CARBOXYMETHYLCELLULOSE SODIUM AND GLYCERIN 5; 9 MG/ML; MG/ML
1 SOLUTION/ DROPS OPHTHALMIC
Qty: 10 ML | Refills: 0 | Status: SHIPPED | OUTPATIENT
Start: 2021-01-01

## 2021-01-01 RX ORDER — LORAZEPAM 2 MG/ML
0.5 CONCENTRATE ORAL
Status: DISCONTINUED | OUTPATIENT
Start: 2021-01-01 | End: 2021-01-01 | Stop reason: HOSPADM

## 2021-01-01 RX ADMIN — REMDESIVIR 200 MG: 100 INJECTION, POWDER, LYOPHILIZED, FOR SOLUTION INTRAVENOUS at 21:01

## 2021-01-01 RX ADMIN — Medication 2000 UNITS: at 08:46

## 2021-01-01 RX ADMIN — SODIUM CHLORIDE 6 UNITS/HR: 9 INJECTION, SOLUTION INTRAVENOUS at 16:43

## 2021-01-01 RX ADMIN — SODIUM CHLORIDE 335 ML: 900 INJECTION, SOLUTION INTRAVENOUS at 13:52

## 2021-01-01 RX ADMIN — HEPARIN SODIUM 5000 UNITS: 5000 INJECTION INTRAVENOUS; SUBCUTANEOUS at 12:52

## 2021-01-01 RX ADMIN — HEPARIN SODIUM 5000 UNITS: 5000 INJECTION INTRAVENOUS; SUBCUTANEOUS at 21:01

## 2021-01-01 RX ADMIN — WATER 2 G: 1 INJECTION INTRAMUSCULAR; INTRAVENOUS; SUBCUTANEOUS at 13:22

## 2021-01-01 RX ADMIN — Medication 5 MG: at 21:48

## 2021-01-01 RX ADMIN — REMDESIVIR 100 MG: 100 INJECTION, POWDER, LYOPHILIZED, FOR SOLUTION INTRAVENOUS at 03:33

## 2021-01-01 RX ADMIN — INSULIN LISPRO 2 UNITS: 100 INJECTION, SOLUTION INTRAVENOUS; SUBCUTANEOUS at 23:52

## 2021-01-01 RX ADMIN — HEPARIN SODIUM 15 UNITS/KG/HR: 10000 INJECTION, SOLUTION INTRAVENOUS at 03:27

## 2021-01-01 RX ADMIN — Medication 10 ML: at 21:48

## 2021-01-01 RX ADMIN — Medication 81 MG: at 08:45

## 2021-01-01 RX ADMIN — ATORVASTATIN CALCIUM 10 MG: 10 TABLET, FILM COATED ORAL at 23:49

## 2021-01-01 RX ADMIN — DEXAMETHASONE SODIUM PHOSPHATE 6 MG: 4 INJECTION, SOLUTION INTRAMUSCULAR; INTRAVENOUS at 15:57

## 2021-01-01 RX ADMIN — Medication 40 ML: at 06:00

## 2021-01-01 RX ADMIN — Medication 5 MG: at 23:49

## 2021-01-01 RX ADMIN — SODIUM CHLORIDE 60 ML/HR: 900 INJECTION, SOLUTION INTRAVENOUS at 21:02

## 2021-01-01 RX ADMIN — ONDANSETRON 4 MG: 2 INJECTION INTRAMUSCULAR; INTRAVENOUS at 08:50

## 2021-01-01 RX ADMIN — MORPHINE SULFATE 5 MG: 100 SOLUTION ORAL at 11:29

## 2021-01-01 RX ADMIN — INSULIN LISPRO 2 UNITS: 100 INJECTION, SOLUTION INTRAVENOUS; SUBCUTANEOUS at 23:21

## 2021-01-01 RX ADMIN — Medication 250 MG: at 08:45

## 2021-01-01 RX ADMIN — INSULIN GLARGINE 10 UNITS: 100 INJECTION, SOLUTION SUBCUTANEOUS at 23:21

## 2021-01-01 RX ADMIN — Medication 10 ML: at 17:21

## 2021-01-01 RX ADMIN — Medication 40 ML: at 22:00

## 2021-01-01 RX ADMIN — AZITHROMYCIN MONOHYDRATE 500 MG: 500 INJECTION, POWDER, LYOPHILIZED, FOR SOLUTION INTRAVENOUS at 12:51

## 2021-01-01 RX ADMIN — ONDANSETRON 4 MG: 2 INJECTION INTRAMUSCULAR; INTRAVENOUS at 13:33

## 2021-01-01 RX ADMIN — AZITHROMYCIN MONOHYDRATE 500 MG: 500 INJECTION, POWDER, LYOPHILIZED, FOR SOLUTION INTRAVENOUS at 13:22

## 2021-01-01 RX ADMIN — Medication 2000 UNITS: at 08:49

## 2021-01-01 RX ADMIN — AMLODIPINE BESYLATE 5 MG: 5 TABLET ORAL at 08:45

## 2021-01-01 RX ADMIN — Medication 81 MG: at 08:49

## 2021-01-01 RX ADMIN — INSULIN LISPRO 4 UNITS: 100 INJECTION, SOLUTION INTRAVENOUS; SUBCUTANEOUS at 12:45

## 2021-01-01 RX ADMIN — Medication 1 CAPSULE: at 08:47

## 2021-01-01 RX ADMIN — Medication 250 MG: at 18:21

## 2021-01-01 RX ADMIN — WATER 2 G: 1 INJECTION INTRAMUSCULAR; INTRAVENOUS; SUBCUTANEOUS at 12:48

## 2021-01-01 RX ADMIN — MORPHINE SULFATE 5 MG: 100 SOLUTION ORAL at 05:48

## 2021-01-01 RX ADMIN — Medication 250 MG: at 08:49

## 2021-01-01 RX ADMIN — Medication 10 ML: at 03:33

## 2021-01-01 RX ADMIN — Medication 2.5 MG: at 23:22

## 2021-01-01 RX ADMIN — INSULIN GLARGINE 10 UNITS: 100 INJECTION, SOLUTION SUBCUTANEOUS at 23:51

## 2021-01-01 RX ADMIN — ONDANSETRON 4 MG: 2 INJECTION INTRAMUSCULAR; INTRAVENOUS at 13:02

## 2021-01-01 RX ADMIN — INSULIN LISPRO 2 UNITS: 100 INJECTION, SOLUTION INTRAVENOUS; SUBCUTANEOUS at 16:30

## 2021-01-01 RX ADMIN — DEXAMETHASONE SODIUM PHOSPHATE 6 MG: 4 INJECTION, SOLUTION INTRAMUSCULAR; INTRAVENOUS at 16:10

## 2021-01-01 RX ADMIN — SODIUM CHLORIDE 1.2 UNITS/HR: 9 INJECTION, SOLUTION INTRAVENOUS at 19:55

## 2021-01-01 RX ADMIN — Medication 1 CAPSULE: at 09:00

## 2021-01-01 RX ADMIN — HEPARIN SODIUM 5000 UNITS: 5000 INJECTION INTRAVENOUS; SUBCUTANEOUS at 03:09

## 2021-01-01 RX ADMIN — SODIUM CHLORIDE 1000 ML: 900 INJECTION, SOLUTION INTRAVENOUS at 13:48

## 2021-01-01 RX ADMIN — ATORVASTATIN CALCIUM 10 MG: 10 TABLET, FILM COATED ORAL at 23:22

## 2021-01-01 RX ADMIN — Medication 10 ML: at 05:04

## 2021-01-01 RX ADMIN — Medication 250 MG: at 18:48

## 2021-01-01 RX ADMIN — Medication 10 ML: at 14:00

## 2021-01-01 RX ADMIN — SODIUM CHLORIDE 8.6 UNITS/HR: 9 INJECTION, SOLUTION INTRAVENOUS at 14:33

## 2021-01-01 RX ADMIN — MORPHINE SULFATE 5 MG: 100 SOLUTION ORAL at 13:22

## 2021-01-01 RX ADMIN — HEPARIN SODIUM 18 UNITS/KG/HR: 10000 INJECTION, SOLUTION INTRAVENOUS at 18:00

## 2021-01-01 RX ADMIN — MORPHINE SULFATE 5 MG: 100 SOLUTION ORAL at 10:47

## 2021-01-01 RX ADMIN — SODIUM CHLORIDE 60 ML/HR: 900 INJECTION, SOLUTION INTRAVENOUS at 10:32

## 2021-01-20 NOTE — TELEPHONE ENCOUNTER
TC to Marlo Ledezma, [address] at the [facility], stated the patient do not have any symptoms of tuberculosis

## 2021-01-30 PROBLEM — R09.02 HYPOXIA: Status: ACTIVE | Noted: 2021-01-01

## 2021-01-30 PROBLEM — U07.1 PNEUMONIA DUE TO COVID-19 VIRUS: Status: ACTIVE | Noted: 2021-01-01

## 2021-01-30 PROBLEM — J12.82 PNEUMONIA DUE TO COVID-19 VIRUS: Status: ACTIVE | Noted: 2021-01-01

## 2021-01-30 NOTE — ED PROVIDER NOTES
EMERGENCY DEPARTMENT HISTORY AND PHYSICAL EXAM 
 
1:22 PM 
 
 
Date: 1/30/2021 Patient Name: Nadia Gomez History of Presenting Illness Chief Complaint Patient presents with  Extremity Weakness History Provided By: Patient Additional History (Context): Nadia Gomez is a 80 y.o. female with diabetes, hypertension, obesity and COPD who presents with a complaint of not feeling well since she had her Covid vaccine earlier this week. Patient is at [address] [place] [address]. Patient states she has decreased appetite. She denies chest pain, cough, nausea, vomiting or diarrhea. Jyoti Morris PCP: Chayo Rico MD 
 
 
 
Current Facility-Administered Medications Medication Dose Route Frequency Provider Last Rate Last Admin  sodium chloride (NS) flush 5-10 mL  5-10 mL IntraVENous PRN Tc Martinez DO      
 cefTRIAXone (ROCEPHIN) 2 g in sterile water (preservative free) 20 mL IV syringe  2 g IntraVENous Q24H Tc Martinez DO   2 g at 01/30/21 1322  
 azithromycin (ZITHROMAX) 500 mg in 0.9% sodium chloride 250 mL (VIAL-MATE)  500 mg IntraVENous Q24H Tc Martinez DO   500 mg at 01/30/21 1322  
 glucose chewable tablet 16 g  4 Tab Oral PRN Surekha Martinez DO      
 glucagon (GLUCAGEN) injection 1 mg  1 mg IntraMUSCular PRN Tc Martinez DO      
 dextrose (D50W) injection syrg 12.5-25 g  25-50 mL IntraVENous PRN Tc Martinez DO      
 insulin regular (MYXREDLIN, NOVOLIN, HUMULIN) 100 units/100 ml NS infusion (premix)  0-50 Units/hr IntraVENous TITRATE Tc Martinez DO 6 mL/hr at 01/30/21 1643 6 Units/hr at 01/30/21 1643 Current Outpatient Medications Medication Sig Dispense Refill Gerardo Mikhailel misc 1 Each by Does Not Apply route daily. Please provide walker with wheels and stoppers 1 Each 0  
 Carboxymethylcellulose-Glycern (REFRESH OPTIVE) 0.5-0.9 % drop Apply  to eye.  melatonin 5 mg cap capsule Take 1 Cap by mouth nightly.  30 Cap 5  
  mv,Ca,min-folic acid-vit K1 (ONE-A-DAY WOMEN'S 50 PLUS) 400-20 mcg tab Take 1 Tab by mouth daily. 30 Tab 5  
 glimepiride (AMARYL) 2 mg tablet Take 2 mg by mouth every morning.  bisacodyl (DULCOLAX, BISACODYL,) 10 mg suppository Insert 10 mg into rectum daily as needed (constipation).  amLODIPine (NORVASC) 5 mg tablet TAKE 1 TABLET BY MOUTH ONCE DAILY FOR HTN 30 Tab 0  
 aspirin delayed-release 81 mg tablet TAKE 1 TABLET BY MOUTH ONCE DAILY FOR HEART HEALTH 30 Tab 0  
 lovastatin (MEVACOR) 20 mg tablet TAKE 1 TABLET BY MOUTH DAILY IN THE EVENING FOR HYPERLIPEDEMIA 30 Tab 0  
 EASY TOUCH INSULIN SAFETY SYR 0.5 mL 30 gauge x 5/16\" syrg Use subcutaneously as directed with insulin.  SOD PHOS,M-B/NA PHOS,DI-BA (FLEET ENEMA RE) Insert 1 Dose into rectum daily as needed (Constipation (if no BM, notify MD)).  polyethylene glycol (MIRALAX) 17 gram packet Take 17 g by mouth two (2) times daily as needed (Constipation).  TRUEPLUS LANCETS 30 gauge misc Use to check blood glucose up to twice daily DX:[id number] 300 [address] 3 Past History Past Medical History: 
Past Medical History:  
Diagnosis Date  Abnormality of gait  Acute upper respiratory infections of unspecified site  Anemia  Broken shoulder   
 right  Chest pain  Chronic airway obstruction, not elsewhere classified  COPD  Diabetes ([address]  Essential hypertension, benign  Hearing loss  History of tachycardia   
 possible SVT  Hypercholesterolemia  Hyperlipidemia  Hypertension  Incontinence  Mitral valve prolapse  MVP (mitral valve prolapse)  OAB (overactive bladder)  Osteoarthritis   
 spine  Osteoporosis  Pure hypercholesterolemia  Senile osteoporosis  Thyroid disease  Thyrotoxicosis  Type II or unspecified type diabetes mellitus without mention of complication, not stated as uncontrolled  Urinary tract infection, site not specified Past Surgical History: 
Past Surgical History:  
Procedure Laterality Date  HX KNEE REPLACEMENT  left  HX OPEN REDUCTION INTERNAL FIXATION Left   
 patellar fracture  HX OTHER SURGICAL Right   
 shoulder surgery  IA XCAPSL CTRC RMVL INSJ IO LENS PROSTH CPLX WO ECP  [id number]  
 bilateral  
 
 
Family History: 
Family History Problem Relation Age of Onset  Diabetes Mother  Alcohol abuse Father Oz.Common Arthritis-rheumatoid Sister  Diabetes Brother  Other Brother   
     eye problems  Hypertension Son  Diabetes Son   
Oz.Common Cancer Son Pancreatic cancer  Alcohol abuse Son   
 
 
Social History: 
Social History Tobacco Use  Smoking status: Never Smoker  Smokeless tobacco: Never Used Substance Use Topics  Alcohol use: No  
 Drug use: No  
 
 
Allergies: Allergies Allergen Reactions  Nitrofurantoin Nausea and Vomiting Review of Systems Review of Systems Constitutional: Positive for fatigue. Negative for chills, diaphoresis and fever. HENT: Negative. Negative for congestion, rhinorrhea and sore throat. Eyes: Negative. Negative for pain, discharge and redness. Respiratory: Negative. Negative for cough, chest tightness, shortness of breath and wheezing. Cardiovascular: Negative. Negative for chest pain. Gastrointestinal: Negative. Negative for abdominal pain, constipation, diarrhea, nausea and vomiting. Genitourinary: Negative. Negative for dysuria, flank pain, frequency, hematuria and urgency. Musculoskeletal: Negative. Negative for back pain and neck pain. Skin: Negative. Negative for rash. Neurological: Negative. Negative for syncope, weakness, numbness and headaches. Psychiatric/Behavioral: Negative. All other systems reviewed and are negative. Physical Exam  
 
Visit Vitals BP (!) 131/97 (BP 1 Location: Left upper arm, BP Patient Position: Sitting) Pulse (!) 130 Temp 97.3 °F (36.3 °C) Resp (!) 34 SpO2 96% Physical Exam 
Vitals signs and nursing note reviewed. Constitutional:   
   General: She is not in acute distress. Appearance: Normal appearance. She is underweight. She is ill-appearing. She is not toxic-appearing or diaphoretic. Comments: Patient is hard of hearing. HENT:  
   Head: Normocephalic and atraumatic. Mouth/Throat:  
   Pharynx: No oropharyngeal exudate. Eyes:  
   General: No scleral icterus. Conjunctiva/sclera: Conjunctivae normal.  
   Pupils: Pupils are equal, round, and reactive to light. Neck: Musculoskeletal: Normal range of motion and neck supple. Thyroid: No thyromegaly. Vascular: No hepatojugular reflux or JVD. Trachea: No tracheal deviation. Cardiovascular:  
   Rate and Rhythm: Regular rhythm. Tachycardia present. Pulses: Normal pulses. Radial pulses are 2+ on the right side and 2+ on the left side. Dorsalis pedis pulses are 2+ on the right side and 2+ on the left side. Heart sounds: Normal heart sounds, S1 normal and S2 normal. No murmur. No gallop. No S3 or S4 sounds. Pulmonary:  
   Effort: Pulmonary effort is normal. Tachypnea present. No respiratory distress. Breath sounds: Normal breath sounds. No decreased breath sounds, wheezing, rhonchi or rales. Abdominal:  
   General: Bowel sounds are normal. There is no distension. Palpations: Abdomen is soft. Abdomen is not rigid. There is no mass. Tenderness: There is no abdominal tenderness. There is no guarding or rebound. Negative signs include Mandujano's sign and McBurney's sign. Musculoskeletal: Normal range of motion. Comments: Strength 3 out of 5 throughout. Lymphadenopathy:  
   Head:  
   Right side of head: No submental, submandibular, preauricular or occipital adenopathy. Left side of head: No submental, submandibular, preauricular or occipital adenopathy. Cervical: No cervical adenopathy. Upper Body:  
   Right upper body: No supraclavicular adenopathy. Left upper body: No supraclavicular adenopathy. Skin: 
   General: Skin is warm and dry. Findings: No rash. Neurological:  
   Mental Status: She is alert. She is not disoriented. GCS: GCS eye subscore is 4. GCS verbal subscore is 5. GCS motor subscore is 6. Cranial Nerves: No cranial nerve deficit. Sensory: No sensory deficit. Coordination: Coordination normal.  
   Gait: Gait normal.  
   Deep Tendon Reflexes: Reflexes are normal and symmetric. Comments: Grossly intact. Psychiatric:     
   Speech: Speech normal.     
   Behavior: Behavior normal.     
   Thought Content: Thought content normal.     
   Judgment: Judgment normal.  
 
 
 
 
Diagnostic Study Results Labs - Recent Results (from the past 12 hour(s)) METABOLIC PANEL, COMPREHENSIVE Collection Time: 01/30/21 12:53 PM  
Result Value Ref Range Sodium 134 (L) 136 - 145 mmol/L Potassium 4.0 3.5 - 5.5 mmol/L Chloride 98 (L) 100 - 111 mmol/L  
 CO2 26 21 - 32 mmol/L Anion gap 10 3.0 - 18 mmol/L Glucose 550 (HH) 74 - 99 mg/dL BUN 75 (H) 7.0 - 18 MG/DL Creatinine 1.44 (H) 0.6 - 1.3 MG/DL  
 BUN/Creatinine ratio 52 (H) 12 - 20 GFR est AA 41 (L) >60 ml/min/1.73m2 GFR est non-AA 34 (L) >60 ml/min/1.73m2 Calcium 9.8 8.5 - 10.1 MG/DL Bilirubin, total 0.5 0.2 - 1.0 MG/DL  
 ALT (SGPT) 37 13 - 56 U/L  
 AST (SGOT) 32 10 - 38 U/L Alk. phosphatase 63 45 - 117 U/L Protein, total 8.0 6.4 - 8.2 g/dL Albumin 3.2 (L) 3.4 - 5.0 g/dL Globulin 4.8 (H) 2.0 - 4.0 g/dL A-G Ratio 0.7 (L) 0.8 - 1.7    
CBC WITH AUTOMATED DIFF Collection Time: 01/30/21 12:53 PM  
Result Value Ref Range WBC 6.7 4.6 - 13.2 K/uL  
 RBC 4.91 4.20 - 5.30 M/uL  
 HGB 15.1 12.0 - 16.0 g/dL HCT 42.8 35.0 - 45.0 % MCV 87.2 74.0 - 97.0 FL  
 MCH 30.8 24.0 - 34.0 PG  
 MCHC 35.3 31.0 - 37.0 g/dL  
 RDW 13.2 11.6 - 14.5 % PLATELET 755 576 - 839 K/uL MPV 10.8 9.2 - 11.8 FL  
 NEUTROPHILS 84 (H) 40 - 73 % LYMPHOCYTES 5 (L) 21 - 52 % MONOCYTES 11 (H) 3 - 10 % EOSINOPHILS 0 0 - 5 % BASOPHILS 0 0 - 2 %  
 ABS. NEUTROPHILS 5.6 1.8 - 8.0 K/UL  
 ABS. LYMPHOCYTES 0.3 (L) 0.9 - 3.6 K/UL  
 ABS. MONOCYTES 0.8 0.05 - 1.2 K/UL  
 ABS. EOSINOPHILS 0.0 0.0 - 0.4 K/UL  
 ABS. BASOPHILS 0.0 0.0 - 0.1 K/UL  
 DF AUTOMATED CARDIAC PANEL,(CK, CKMB & TROPONIN) Collection Time: 01/30/21 12:53 PM  
Result Value Ref Range CK - MB 2.1 <3.6 ng/ml CK-MB Index 4.1 (H) 0.0 - 4.0 % CK 51 26 - 192 U/L Troponin-I, QT 0.02 0.0 - 0.045 NG/ML  
HEMOGLOBIN A1C WITH EAG Collection Time: 01/30/21 12:53 PM  
Result Value Ref Range Hemoglobin A1c 9.5 (H) 4.2 - 5.6 % Est. average glucose 226 mg/dL POC LACTIC ACID Collection Time: 01/30/21 12:55 PM  
Result Value Ref Range Lactic Acid (POC) 1.68 0.40 - 2.00 mmol/L POC LACTIC ACID Collection Time: 01/30/21  1:30 PM  
Result Value Ref Range Lactic Acid (POC) 1.55 0.40 - 2.00 mmol/L  
GLUCOSE, POC Collection Time: 01/30/21  2:33 PM  
Result Value Ref Range Glucose (POC) 488 (HH) 70 - 110 mg/dL Silverio Vaca Collection Time: 01/30/21  2:35 PM  
Result Value Ref Range Glucose 488 mg/dL Insulin order 8.6 units/hour Insulin adminstered 8.6 units/hour Multiplier 0.020 Low target 140 mg/dL High target 180 mg/dL D50 order 0.0 ml  
 D50 administered 0.00 ml Minutes until next BG 60 min Order initials sjb Administered initials sjb EKG, 12 LEAD, INITIAL Collection Time: 01/30/21  3:30 PM  
Result Value Ref Range Ventricular Rate 107 BPM  
 Atrial Rate 89 BPM  
 QRS Duration 78 ms Q-T Interval 328 ms QTC Calculation (Bezet) 437 ms Calculated R Axis -20 degrees Calculated T Axis 172 degrees Diagnosis Atrial fibrillation with rapid ventricular response with premature ventricular or aberrantly conducted complexes Moderate voltage criteria for LVH, may be normal variant Inferior infarct (cited on or before 02-MAY-2017) Anterior infarct (cited on or before 02-MAY-2017) Abnormal ECG Confirmed by Stanislaw Cyr MD, Vazquez Anthony ([id number]) on 1/30/2021 4:06:27 PM 
  
GLUCOSE, POC Collection Time: 01/30/21  3:38 PM  
Result Value Ref Range Glucose (POC) 372 (H) 70 - 110 mg/dL Mike Rack Collection Time: 01/30/21  3:41 PM  
Result Value Ref Range Glucose 372 mg/dL Insulin order 3.1 units/hour Insulin adminstered 3.1 units/hour Multiplier 0.010 Low target 140 mg/dL High target 180 mg/dL D50 order 0.0 ml  
 D50 administered 0.00 ml Minutes until next BG 60 min Order initials ER Administered initials ER   
SARS-COV-2 Collection Time: 01/30/21  3:48 PM  
Result Value Ref Range SARS-CoV-2 Please find results under separate order COVID-19 RAPID TEST Collection Time: 01/30/21  3:48 PM  
Result Value Ref Range Specimen source Nasopharyngeal    
 COVID-19 rapid test Detected (AA) NOTD    
GLUCOSE, POC Collection Time: 01/30/21  4:42 PM  
Result Value Ref Range Glucose (POC) 358 (H) 70 - 110 mg/dL Mike Rack Collection Time: 01/30/21  4:43 PM  
Result Value Ref Range Glucose 358 mg/dL Insulin order 6.0 units/hour Insulin adminstered 6.0 units/hour Multiplier 0.020 Low target 140 mg/dL High target 180 mg/dL D50 order 0.0 ml  
 D50 administered 0.00 ml Minutes until next BG 60 min Order initials ER Administered initials ER Radiologic Studies -  
XR CHEST PORT Final Result 1. Mild patchy bilateral pulmonary opacities may be due to infiltrates or  
atelectasis or fibrosis. Medical Decision Making Provider Notes (Medical Decision Making): MDM Number of Diagnoses or Management Options Diagnosis management comments: Shortness of breath etiologies include chronic obstructive pulmonary disease (COPD), acute asthma exacerbation, congestive heart failure, pneumonia, acute bronchitis, pulmonary embolism, upper respiratory infection, cardiac event to include acute coronary syndrome, acute myocardial infarction or a combination of the above (ex URI on top of COPD thus causing respiratory distress). Patient is a 80-year-old  female presents status post vaccination and fatigue. We will do a full work-up sepsis versus cardiac. I am the first provider for this patient. I reviewed the vital signs, available nursing notes, past medical history, past surgical history, family history and social history. Vital Signs-Reviewed the patient's vital signs. Records Reviewed: Nursing Notes (Time of Review: 1:22 PM) 
 
ED Course: Progress Notes, Reevaluation, and Consults: 
 
Labs essentially normal with the exception of blood glucose of 550. Chest X-Ray showed bilateral patchy infiltrates. EKG showed fibrillation at a rate of 107 with no ST elevations or depressions. 3:22 PM 1/30/2021 Consult:  Discussed care with Dr Beronica Hogan. Hospitalist. Standard discussion; including history of patients chief complaint, available diagnostic results, and treatment course. Agrees with admission. Would like first dose of Decadron. Would also like to call ID for remdesivir. 3:22 PM 
 
Consult:  Discussed care with Dr Codey Shelton. ID. Standard discussion; including history of patients chief complaint, available diagnostic results, and treatment course. Agrees with Decadron. Would like remdesivir started after Covid test positive. Requests D-dimer, procalcitonin and CRP tests. 3:26 PM 
 
Patient positive for Covid. Will order intensity. Diagnosis Clinical Impression: 1. Pneumonia due to COVID-19 virus Disposition: Admitted Attestation Provider Attestation: I personally performed the services described in the documentation, reviewed the documentation and it accurately and completely records my words and actions utilizing the [address] January 30, 2021 at 5:19 PM - Mariela Martinez DO Disclaimer. It is dictated using utilizing voice recognition software. Unfortunately this leads to occasional typographical errors. I apologize in advance if the situation occurs. If questions arise please do not hesitate to contact me or call our department.

## 2021-01-30 NOTE — ED TRIAGE NOTES
Per EMS, Patient received her first round of Covid vaccine. Generalized weakness, body aches c/o being cold, no nausea.

## 2021-01-30 NOTE — ED NOTES
Benson Joya 11 to find out when patient received her COVID vaccine. Her shot was 1/22. She started to c/o of not feeling well 1/23 and has c/o feeling worse and worse since.

## 2021-01-30 NOTE — REMOTE MONITORING
Spoke with Juan A Solorio RN to confirm [facility] have been drawn Thanks! Kyrie Marks RN,BSN [facility]

## 2021-01-30 NOTE — ED NOTES
Patient brief was changed, clean linens provided to patient. Patient was placed on a purewick to monitor output and obtain a urine specimen. Results for COVID-19 came back positive. ED provider made aware. Bed locked and in lowest position to ensure safety. Currently, 4 L of oxygen are being used to ensure oxygenation saturations. Will continue to monitor patient status.

## 2021-01-30 NOTE — Clinical Note
Status[de-identified] INPATIENT [101]  Type of Bed: Telemetry [19]  Inpatient Hospitalization Certified Necessary for the Following Reasons: 3.  Patient receiving treatment that can only be provided in an inpatient setting (further clarification in H&P documentati on)  Admitting Diagnosis: Hypoxia [[id number]]  Admitting Diagnosis: Pneumonia due to COVID-19 virus [[id number]]  Admitting Physician: Debbie Huitron  Attending Physician: Debbie Huitron  Estimated Length of Stay: 3-4 Midnights  Discharge  Plan[de-identified] Extended Care Facility (e.g. Adult Home, Nursing Home, etc.)

## 2021-01-31 NOTE — PROGRESS NOTES
[facility] Progress Note Patient: Lieutenant Davila Age: 80 y.o. : 1926 MR#: [id number] SSN: [id number] Date: 2021 Subjective:  
[facility]. Difficulty with understanding what I am saying. States \"I feel so bad\". She could not verbalize what is making her feel bad. HFNC Assessment/Plan:  
1. COVID-19 pneumonia 2. Hypoxia secondary to COVID PNA 3. afib with RVR, new onset in setting of COVID PNA 4. BURT. Resolved 5. DMT2 with hyperglycemia 6. Advanced Age 7. Hard of hearing 8. HTN, HLD 9. History of mild aortic stenosis 10. History of COPD Plan 1. Monitor inflammatory markers. Monitor oxygen demand. Currently on 20L HFNC. Continue IV abx, dexamethasone, Remdesivir, vit c/d, zinc 
2. Cardiology recommendations to start anticoagulation Lovenox or heparin gtt for new onset afib secondary to COVID PNA. Heparin infusion started 3. ID consult. Palliative care consult 4. POC glucose, SSI, lantus 5. Monitor metabolic panel and renal function 6. Attempted to call israel Denson and number provided is not a working number. Additional Notes:   
 
Case discussed with:  [x]Patient  []Family  [x]Nursing  []Case Management DVT Prophylaxis:  []Lovenox  []Hep SQ  []SCDs  []Coumadin   [x]On Heparin gtt Objective:  
VS:  
Visit Vitals BP (!) 165/69 Pulse (!) 120 Temp 97.3 °F (36.3 °C) Resp 27 SpO2 96% Tmax/24hrs: Temp (24hrs), Av.3 °F (36.3 °C), Min:97.3 °F (36.3 °C), Max:97.3 °F (36.3 °C) No intake or output data in the 24 hours ending 21 1051 General:  Alert, NAD Cardiovascular:  Keisha Luria Pulmonary:  LSC throughout; respiratory effort WNL 
GI:  +BS in all four quadrants, soft, non-tender Extremities:  No edema; 2+ dorsalis pedis pulses bilaterally Neuro: [facility], alert 2-3 Labs:   
Recent Results (from the past 24 hour(s)) METABOLIC PANEL, COMPREHENSIVE  Collection Time: 21 12:53 PM  
 Result Value Ref Range Sodium 134 (L) 136 - 145 mmol/L Potassium 4.0 3.5 - 5.5 mmol/L Chloride 98 (L) 100 - 111 mmol/L  
 CO2 26 21 - 32 mmol/L Anion gap 10 3.0 - 18 mmol/L Glucose 550 (HH) 74 - 99 mg/dL BUN 75 (H) 7.0 - 18 MG/DL Creatinine 1.44 (H) 0.6 - 1.3 MG/DL  
 BUN/Creatinine ratio 52 (H) 12 - 20 GFR est AA 41 (L) >60 ml/min/1.73m2 GFR est non-AA 34 (L) >60 ml/min/1.73m2 Calcium 9.8 8.5 - 10.1 MG/DL Bilirubin, total 0.5 0.2 - 1.0 MG/DL  
 ALT (SGPT) 37 13 - 56 U/L  
 AST (SGOT) 32 10 - 38 U/L Alk. phosphatase 63 45 - 117 U/L Protein, total 8.0 6.4 - 8.2 g/dL Albumin 3.2 (L) 3.4 - 5.0 g/dL Globulin 4.8 (H) 2.0 - 4.0 g/dL A-G Ratio 0.7 (L) 0.8 - 1.7    
CBC WITH AUTOMATED DIFF Collection Time: 01/30/21 12:53 PM  
Result Value Ref Range WBC 6.7 4.6 - 13.2 K/uL  
 RBC 4.91 4.20 - 5.30 M/uL  
 HGB 15.1 12.0 - 16.0 g/dL HCT 42.8 35.0 - 45.0 % MCV 87.2 74.0 - 97.0 FL  
 MCH 30.8 24.0 - 34.0 PG  
 MCHC 35.3 31.0 - 37.0 g/dL  
 RDW 13.2 11.6 - 14.5 % PLATELET 180 332 - 347 K/uL MPV 10.8 9.2 - 11.8 FL  
 NEUTROPHILS 84 (H) 40 - 73 % LYMPHOCYTES 5 (L) 21 - 52 % MONOCYTES 11 (H) 3 - 10 % EOSINOPHILS 0 0 - 5 % BASOPHILS 0 0 - 2 %  
 ABS. NEUTROPHILS 5.6 1.8 - 8.0 K/UL  
 ABS. LYMPHOCYTES 0.3 (L) 0.9 - 3.6 K/UL  
 ABS. MONOCYTES 0.8 0.05 - 1.2 K/UL  
 ABS. EOSINOPHILS 0.0 0.0 - 0.4 K/UL  
 ABS. BASOPHILS 0.0 0.0 - 0.1 K/UL  
 DF AUTOMATED CARDIAC PANEL,(CK, CKMB & TROPONIN) Collection Time: 01/30/21 12:53 PM  
Result Value Ref Range CK - MB 2.1 <3.6 ng/ml CK-MB Index 4.1 (H) 0.0 - 4.0 % CK 51 26 - 192 U/L Troponin-I, QT 0.02 0.0 - 0.045 NG/ML  
HEMOGLOBIN A1C WITH EAG Collection Time: 01/30/21 12:53 PM  
Result Value Ref Range Hemoglobin A1c 9.5 (H) 4.2 - 5.6 % Est. average glucose 226 mg/dL CULTURE, BLOOD Collection Time: 01/30/21 12:54 PM  
 Specimen: Blood Result Value Ref Range Special Requests: NO SPECIAL REQUESTS Culture result: NO GROWTH AFTER 18 HOURS    
POC LACTIC ACID Collection Time: 01/30/21 12:55 PM  
Result Value Ref Range Lactic Acid (POC) 1.68 0.40 - 2.00 mmol/L  
CULTURE, BLOOD Collection Time: 01/30/21  1:28 PM  
 Specimen: Blood Result Value Ref Range Special Requests: NO SPECIAL REQUESTS Culture result: NO GROWTH AFTER 18 HOURS    
POC LACTIC ACID Collection Time: 01/30/21  1:30 PM  
Result Value Ref Range Lactic Acid (POC) 1.55 0.40 - 2.00 mmol/L  
GLUCOSE, POC Collection Time: 01/30/21  2:33 PM  
Result Value Ref Range Glucose (POC) 488 (HH) 70 - 110 mg/dL [address] Collection Time: 01/30/21  2:35 PM  
Result Value Ref Range Glucose 488 mg/dL Insulin order 8.6 units/hour Insulin adminstered 8.6 units/hour Multiplier 0.020 Low target 140 mg/dL High target 180 mg/dL D50 order 0.0 ml  
 D50 administered 0.00 ml Minutes until next BG 60 min Order initials sjb Administered initials sjb EKG, 12 LEAD, INITIAL Collection Time: 01/30/21  3:30 PM  
Result Value Ref Range Ventricular Rate 107 BPM  
 Atrial Rate 89 BPM  
 QRS Duration 78 ms Q-T Interval 328 ms QTC Calculation (Bezet) 437 ms Calculated R Axis -20 degrees Calculated T Axis 172 degrees Diagnosis Atrial fibrillation with rapid ventricular response with premature  
ventricular or aberrantly conducted complexes Moderate voltage criteria for LVH, may be normal variant Inferior infarct (cited on or before 02-MAY-2017) Anterior infarct (cited on or before 02-MAY-2017) Abnormal ECG Confirmed by Sai Sunshine MD, Bhavana Man ([id number]) on 1/30/2021 4:06:27 PM 
  
GLUCOSE, POC Collection Time: 01/30/21  3:38 PM  
Result Value Ref Range Glucose (POC) 372 (H) 70 - 110 mg/dL [address] Collection Time: 01/30/21  3:41 PM  
Result Value Ref Range Glucose 372 mg/dL Insulin order 3.1 units/hour Insulin adminstered 3.1 units/hour Multiplier 0.010 Low target 140 mg/dL High target 180 mg/dL D50 order 0.0 ml  
 D50 administered 0.00 ml Minutes until next BG 60 min Order initials ER Administered initials ER   
SARS-COV-2 Collection Time: 01/30/21  3:48 PM  
Result Value Ref Range SARS-CoV-2 Please find results under separate order COVID-19 RAPID TEST Collection Time: 01/30/21  3:48 PM  
Result Value Ref Range Specimen source Nasopharyngeal    
 COVID-19 rapid test Detected (AA) NOTD    
GLUCOSE, POC Collection Time: 01/30/21  4:42 PM  
Result Value Ref Range Glucose (POC) 358 (H) 70 - 110 mg/dL [facility] Collection Time: 01/30/21  4:43 PM  
Result Value Ref Range Glucose 358 mg/dL Insulin order 6.0 units/hour Insulin adminstered 6.0 units/hour Multiplier 0.020 Low target 140 mg/dL High target 180 mg/dL D50 order 0.0 ml  
 D50 administered 0.00 ml Minutes until next BG 60 min Order initials ER Administered initials ER   
GLUCOSE, POC Collection Time: 01/30/21  5:43 PM  
Result Value Ref Range Glucose (POC) 268 (H) 70 - 110 mg/dL [facility] Collection Time: 01/30/21  5:44 PM  
Result Value Ref Range Glucose 268 mg/dL Insulin order 2.1 units/hour Insulin adminstered 2.1 units/hour Multiplier 0.010 Low target 140 mg/dL High target 180 mg/dL D50 order 0.0 ml  
 D50 administered 0.00 ml Minutes until next BG 60 min Order initials er Administered initials er GLUCOSE, POC Collection Time: 01/30/21  6:45 PM  
Result Value Ref Range Glucose (POC) 279 (H) 70 - 110 mg/dL [facility] Collection Time: 01/30/21  6:46 PM  
Result Value Ref Range Glucose 279 mg/dL Insulin order 4.4 units/hour Insulin adminstered 4.4 units/hour Multiplier 0.020 Low target 140 mg/dL High target 180 mg/dL D50 order 0.0 ml  
 D50 administered 0.00 ml Minutes until next BG 60 min Order initials ER Administered initials ER   
URINALYSIS W/ RFLX MICROSCOPIC Collection Time: 01/30/21  6:50 PM  
Result Value Ref Range Color YELLOW Appearance CLEAR Specific gravity 1.027 1.005 - 1.030    
 pH (UA) 5.5 5.0 - 8.0 Protein 30 (A) NEG mg/dL Glucose >1,000 (A) NEG mg/dL Ketone 15 (A) NEG mg/dL Bilirubin Negative NEG Blood Negative NEG Urobilinogen 0.2 0.2 - 1.0 EU/dL Nitrites Negative NEG Leukocyte Esterase Negative NEG    
URINE MICROSCOPIC ONLY Collection Time: 01/30/21  6:50 PM  
Result Value Ref Range WBC Negative 0 - 4 /hpf  
 RBC 0 to 2 0 - 5 /hpf Epithelial cells FEW 0 - 5 /lpf Bacteria Negative NEG /hpf  
GLUCOSE, POC Collection Time: 01/30/21  7:53 PM  
Result Value Ref Range Glucose (POC) 176 (H) 70 - 110 mg/dL Hannah Franks Collection Time: 01/30/21  7:54 PM  
Result Value Ref Range Glucose 176 mg/dL Insulin order 1.2 units/hour Insulin adminstered 1.2 units/hour Multiplier 0.010 Low target 140 mg/dL High target 180 mg/dL D50 order 0.0 ml  
 D50 administered 0.00 ml Minutes until next BG 60 min Order initials sj Administered initials sj SARS-COV-2 Collection Time: 01/30/21  9:16 PM  
Result Value Ref Range SARS-CoV-2 Please find results under separate order GLUCOSE, POC Collection Time: 01/30/21 11:20 PM  
Result Value Ref Range Glucose (POC) 193 (H) 70 - 110 mg/dL C REACTIVE PROTEIN, QT Collection Time: 01/31/21  6:30 AM  
Result Value Ref Range C-Reactive protein 5.1 (H) 0 - 0.3 mg/dL D DIMER Collection Time: 01/31/21  6:30 AM  
Result Value Ref Range D DIMER 2.45 (H) <0.46 ug/ml(FEU) CBC WITH AUTOMATED DIFF Collection Time: 01/31/21  6:30 AM  
Result Value Ref Range WBC 6.9 4.6 - 13.2 K/uL  
 RBC 4.48 4.20 - 5.30 M/uL  
 HGB 14.6 12.0 - 16.0 g/dL HCT 40.8 35.0 - 45.0 %  MCV 91.1 74.0 - 97.0 FL  
 MCH 32.6 24.0 - 34.0 PG  
 MCHC 35.8 31.0 - 37.0 g/dL  
 RDW 13.5 11.6 - 14.5 % PLATELET 528 056 - 799 K/uL MPV 10.8 9.2 - 11.8 FL  
 NEUTROPHILS 83 (H) 40 - 73 % LYMPHOCYTES 5 (L) 21 - 52 % MONOCYTES 12 (H) 3 - 10 % EOSINOPHILS 0 0 - 5 % BASOPHILS 0 0 - 2 %  
 ABS. NEUTROPHILS 5.8 1.8 - 8.0 K/UL  
 ABS. LYMPHOCYTES 0.3 (L) 0.9 - 3.6 K/UL  
 ABS. MONOCYTES 0.8 0.05 - 1.2 K/UL  
 ABS. EOSINOPHILS 0.0 0.0 - 0.4 K/UL  
 ABS. BASOPHILS 0.0 0.0 - 0.1 K/UL  
 DF AUTOMATED    
GLUCOSE, POC Collection Time: 01/31/21  7:54 AM  
Result Value Ref Range Glucose (POC) 141 (H) 70 - 110 mg/dL Signed By: Jamal Mccoy NP   
 January 31, 2021

## 2021-01-31 NOTE — PROGRESS NOTES
Problem: Dysphagia (Adult) Goal: *Acute Goals and Plan of Care (Insert Text) Description: Recommendations: 
Diet: Mechanical soft solids, thin liquid Meds: Per patient preference Aspiration Precautions Oral Care TID Goals:  Patient will: 1. Tolerate PO trials with 0 s/s overt distress in 4/5 trials 2. Utilize compensatory swallow strategies/maneuvers (decrease bite/sip, size/rate, alt. liq/sol) with min cues in 4/5 trials Outcome: Progressing Towards Goal 
 
SPEECH LANGUAGE PATHOLOGY BEDSIDE SWALLOW EVALUATION Patient: Jd Jorge (89 y.o. female) Date: 1/31/2021 Primary Diagnosis: Hypoxia [R09.02] Pneumonia due to COVID-19 virus [U07.1, J12.82] Precautions: Aspiration PLOF: [facility] 
 
ASSESSMENT : 
Based on the objective data described below, the patient presents with mild oral dysphagia. A&Ox2, speech perseverative, decreased command following. Very [place]. Oral-motor exam revealed structures grossly intact for mastication and deglutition. Patient observed self-feeding 1 trial each of thin liquid + straw, puree and solid trials. Pt exhibited moderately delayed mastication of solids with otherwise adequate bolus cohesion, manipulation and A-P transit. Further exhibited adequate swallow timing/reflex and hyolaryngeal excursion. Able to manipulate and clear with 0 clinical s/s aspiration. Refused all further trials. Recommend mechanical soft solid, thin liquid diet with aspiration precautions, small bites/sips, slow rate. SLP will continue to follow as indicated. Patient will benefit from skilled intervention to address the above impairments. Patient's rehabilitation potential is considered to be Fair Factors which may influence rehabilitation potential include:  
[]            None noted [x]            Mental ability/status [x]            Medical condition []            Home/family situation and support systems [x]            Safety awareness 
[]            Pain tolerance/management []            Other: PLAN : 
Recommendations and Planned Interventions: 
Mechanical soft, thin liquid Frequency/Duration: Patient will be followed by speech-language pathology 1-2 times per day/4-7 days per week to address goals. Discharge Recommendations: To Be Determined SUBJECTIVE:  
Patient stated I feel terrible. OBJECTIVE:  
 
Past Medical History:  
Diagnosis Date Abnormality of gait Acute upper respiratory infections of unspecified site Anemia Broken shoulder   
 right Chest pain Chronic airway obstruction, not elsewhere classified COPD Diabetes ([address] Essential hypertension, benign Hearing loss History of tachycardia   
 possible SVT Hypercholesterolemia Hyperlipidemia Hypertension Incontinence Mitral valve prolapse MVP (mitral valve prolapse) OAB (overactive bladder) Osteoarthritis   
 spine Osteoporosis Pure hypercholesterolemia Senile osteoporosis Thyroid disease Thyrotoxicosis Type II or unspecified type diabetes mellitus without mention of complication, not stated as uncontrolled Urinary tract infection, site not specified Past Surgical History:  
Procedure Laterality Date HX KNEE REPLACEMENT  left HX OPEN REDUCTION INTERNAL FIXATION Left   
 patellar fracture HX OTHER SURGICAL Right   
 shoulder surgery SD XCAPSL CTRC RMVL INSJ IO LENS PROSTH CPLX WO ECP  [id number]  
 bilateral  
 
Home Situation:  
 
Diet prior to admission: Regular/thin Current Diet:  Mech-soft/thin  
 
Cognitive and Communication Status: 
Neurologic State: Alert Orientation Level: Oriented to person, Oriented to situation Cognition: Follows commands, Memory loss Perception: Appears intact Perseveration: Perseverates during conversation Safety/Judgement: Decreased insight into deficits Oral Assessment: 
Oral Assessment Labial: No impairment Dentition: Natural 
Oral Hygiene: Fair Lingual: No impairment Velum: No impairment Mandible: No impairment P.O. Trials: 
Patient Position: WMX 65 Vocal quality prior to P.O.: No impairment Consistency Presented: Thin liquid;Puree; Solid How Presented: Self-fed/presented;Straw;Successive swallows Bolus Acceptance: No impairment Bolus Formation/Control: Impaired Type of Impairment: Delayed;Mastication Propulsion: No impairment Oral Residue: None Initiation of Swallow: No impairment Laryngeal Elevation: Functional 
Aspiration Signs/Symptoms: None Pharyngeal Phase Characteristics: No impairment, issues, or problems Effective Modifications: Small sips and bites Cues for Modifications: Moderate Oral Phase Severity: Mild Pharyngeal Phase Severity : No impairment PAIN: 
Pain level pre-treatment: 0/10 Pain level post-treatment: 0/10 After treatment:  
[]            Patient left in no apparent distress sitting up in chair 
[x]            Patient left in no apparent distress in bed 
[x]            Call bell left within reach [x]            Nursing notified []            Family present 
[]            Caregiver present 
[]            Bed alarm activated COMMUNICATION/EDUCATION:  
[x]            Aspiration precautions; swallow safety; compensatory techniques. []            Patient/family have participated as able in goal setting and plan of care. []            Patient/family agree to work toward stated goals and plan of care. []            Patient understands intent and goals of therapy; neutral about participation. [x]            Patient unable to participate in goal setting/plan of care; educ ongoing with interdisciplinary staff 
[]         Posted safety precautions in patient's room. Thank you for this referral, Malachi Rowell, SLP Time Calculation: 10 mins

## 2021-01-31 NOTE — ED NOTES
Assisted patient with lunch. Patient ate a few bites of jell-O, but did not want to eat anything else. Offered patient apple sauce and pudding, but patient declined.

## 2021-01-31 NOTE — ED NOTES
Shift report received from [facility], [address] [facility]. Assumed care of patient. Received patient resting on stretcher, awake, alert, oriented to person and place only. Introduced myself as her primary nurse. Oriented to surroundings. Explanation of plan of care provided to the patient. Awaiting inpatient bed assignment. VSS, refer to flow sheet.

## 2021-01-31 NOTE — CONSULTS
[facility] - Consult Note Consultation request by Juan Clifton MD  for A.fib Date of  Admission: 1/30/2021 12:36 PM  
Primary Care Physician:  Tierney Austin MD 
 
 Assessment:  
 
Patient Active Problem List  
Diagnosis Code  Hypoglycemia E16.2  Thyromegaly E01.0  
 NIDDM (non-insulin dependent diabetes mellitus)  HTN (hypertension) I10  
 Hyperlipidemia E78.5  Fatigue R53.83  Thyroid nodule E04.1  Mitral valve prolapse I34.1  Chronic airway obstruction, not elsewhere classified J44.9  Abnormality of gait R26.9  Acute upper respiratory infections of unspecified site J06.9  Senile osteoporosis M81.0  
 Urinary tract infection, site not specified N39.0  Chest pain R07.9  Thyrotoxicosis E05.90  Pure hypercholesterolemia E78.00  
 History of tachycardia Z87.898  
 Diabetes mellitus type 2, controlled ([address] E11.9  Hyperthyroidism E05.90  
 Urinary tract infection N39.0  Hypercholesterolemia E78.00  Hypertension I10  
 MVP (mitral valve prolapse) I34.1  Broken shoulder S42. 90XA  Osteoarthritis M19.90  
 Incontinence R32  
 OAB (overactive bladder) N32.81  
 Advance directive discussed with patient Z71.89  Spondylosis of cervical region without myelopathy or radiculopathy M47.812  
 Nocturia R35.1  Hx of subdural hematoma Z86.79  
 STEMI (ST elevation myocardial infarction) ([facility]) I21.3  Neck pain M54.2  Type 2 diabetes mellitus with nephropathy ([facility]) E11.21  
 Overactive bladder N32.81  
 Controlled type 2 diabetes mellitus without complication, with long-term current use of insulin ([facility]) E11.9, Z79.4  Hypoxia R09.02  
 Pneumonia due to COVID-19 virus U07.1, J12.82  
 
 
-New A.fib w/RVR, likely trigger stress response 
-Covid + pneumonia 
-h/o mild aortic stenosis with mean gradient 25 mmHg and MATTHEW 1.1 cm^2 by echo in 2017 
-Acute renal failure 
-h/o HTN 
-h/o diabetes, HgbA1c 9.5 1/20/21 
-h/o dyslipidemia -h/o COPD 
-Thyroid disorder 
-Advanced age, poor functional status Previous patient of Dr. Sue Ware, no h/o cardiologist or heart disease Atrial fibrillation CHADSVASC2 score stroke risk:  
80 y. o.                                        > 76        +2  
female                                         Female +1 
CHF hx                                           No    + 0 HTN hx                                           Yes    +1 Stroke/TIA/Thromboembolism       No    +0 Vascular disease hx                       No    + 0 Diabetes Mellitus                            Yes    +1 CHADSVASC2 score                    5      Annual Stroke Risk 7.2% - moderate-high Plan:  
 
Patient has new A.fib/RVR in setting of covid confirmed pneumonia. If no contraindications, I would start therapeutic heparin drip or lovenox for stroke prevention given her a.fib as well as presumed hypercoagulable state with covid. I would be careful with aggressive fluids given her history of mild aortic stenosis in 2017, I will followup echo. Given her advanced age, comorbidities, prognosis remains guarded, ongoing discussions about code status and goals of care if she decompensates. History of Present Illness: This is a 80 y.o. female admitted for Hypoxia [R09.02] Pneumonia due to COVID-19 virus [U07.1, J12.82]. Patient complains of:   
Presented yesterday with generalized fatigue for a week. Sent to ER from [facility]. Very poor historian but current denies any chest pain, dyspnea, cough, fevers, chills. No problems with stools, abdominal pain. No edema. Very hard of hearing. Asked to see for new A.fib in setting of covid pneumonia. Review of Symptoms:  Except as stated above include: 
Constitutional:  fatigue Ears, nose, throat:  Negative Respiratory:  negative Cardiovascular:  negative Gastrointestinal: negative Genitourinary:  negative Musculoskeletal:  Negative Neurological:  Mild confusion, oriented person Dermatological:  Negative Hematological: Negative Endocrinological: Negative Allergy: Negative Psychological:  Resident of [address] [place] [address] Past Medical History:  
 
Past Medical History:  
Diagnosis Date  Abnormality of gait  Acute upper respiratory infections of unspecified site  Anemia  Broken shoulder   
 right  Chest pain  Chronic airway obstruction, not elsewhere classified  COPD  Diabetes ([facility] [address]  Essential hypertension, benign  Hearing loss  History of tachycardia   
 possible SVT  Hypercholesterolemia  Hyperlipidemia  Hypertension  Incontinence  Mitral valve prolapse  MVP (mitral valve prolapse)  OAB (overactive bladder)  Osteoarthritis   
 spine  Osteoporosis  Pure hypercholesterolemia  Senile osteoporosis  Thyroid disease  Thyrotoxicosis  Type II or unspecified type diabetes mellitus without mention of complication, not stated as uncontrolled  Urinary tract infection, site not specified Social History:  
 
Social History Socioeconomic History  Marital status:  Spouse name: Not on file  Number of children: Not on file  Years of education: Not on file  Highest education level: Not on file Tobacco Use  Smoking status: Never Smoker  Smokeless tobacco: Never Used Substance and Sexual Activity  Alcohol use: No  
 Drug use: No  
 Sexual activity: Never Family History:  
 
Family History Problem Relation Age of Onset  Diabetes Mother  Alcohol abuse Father Rob Self Arthritis-rheumatoid Sister  Diabetes Brother  Other Brother   
     eye problems  Hypertension Son  Diabetes Son   
Stebbins Self Cancer Son Pancreatic cancer  Alcohol abuse Son Medications: Allergies Allergen Reactions  Nitrofurantoin Nausea and Vomiting Current Facility-Administered Medications Medication Dose Route Frequency  0.9% sodium chloride infusion  60 mL/hr IntraVENous CONTINUOUS  
 melatonin (rapid dissolve) tablet 5 mg  5 mg Oral QHS  sodium chloride (NS) flush 5-10 mL  5-10 mL IntraVENous PRN  
 cefTRIAXone (ROCEPHIN) 2 g in sterile water (preservative free) 20 mL IV syringe  2 g IntraVENous Q24H  
 azithromycin (ZITHROMAX) 500 mg in 0.9% sodium chloride 250 mL (VIAL-MATE)  500 mg IntraVENous Q24H  
 glucose chewable tablet 16 g  4 Tab Oral PRN  
 glucagon (GLUCAGEN) injection 1 mg  1 mg IntraMUSCular PRN  
 dextrose (D50W) injection syrg 12.5-25 g  25-50 mL IntraVENous PRN  
 remdesivir 100 mg in 0.9% sodium chloride 250 mL IVPB  100 mg IntraVENous Q24H  
 dexamethasone (DECADRON) 4 mg/mL injection 6 mg  6 mg IntraVENous Q24H  
 ascorbic acid (vitamin C) (VITAMIN C) tablet 250 mg  250 mg Oral BID  cholecalciferol (VITAMIN D3) (1000 Units /25 mcg) tablet 2,000 Units  2,000 Units Oral DAILY  zinc sulfate (ZINCATE) 220 (50) mg capsule 1 Cap  1 Cap Oral DAILY  sodium chloride (NS) flush 5-40 mL  5-40 mL IntraVENous Q8H  
 sodium chloride (NS) flush 5-40 mL  5-40 mL IntraVENous PRN  
 acetaminophen (TYLENOL) tablet 650 mg  650 mg Oral Q6H PRN Or  
 acetaminophen (TYLENOL) suppository 650 mg  650 mg Rectal Q6H PRN  polyethylene glycol (MIRALAX) packet 17 g  17 g Oral DAILY PRN  promethazine (PHENERGAN) tablet 12.5 mg  12.5 mg Oral Q6H PRN Or  
 ondansetron (ZOFRAN) injection 4 mg  4 mg IntraVENous Q6H PRN  
 heparin (porcine) injection 5,000 Units  5,000 Units SubCUTAneous Q8H  
 aspirin delayed-release tablet 81 mg  81 mg Oral DAILY  atorvastatin (LIPITOR) tablet 10 mg  10 mg Oral QHS  amLODIPine (NORVASC) tablet 5 mg  5 mg Oral DAILY  insulin glargine (LANTUS) injection 10 Units  10 Units SubCUTAneous QHS  insulin lispro (HUMALOG) injection   SubCUTAneous AC&HS  
 glucose chewable tablet 16 g  4 Tab Oral PRN  
  glucagon (GLUCAGEN) injection 1 mg  1 mg IntraMUSCular PRN  
 dextrose (D50W) injection syrg 12.5-25 g  25-50 mL IntraVENous PRN  
 albuterol (PROVENTIL HFA, VENTOLIN HFA, PROAIR HFA) inhaler 2 Puff  2 Puff Inhalation Q4H PRN Current Outpatient Medications Medication Sig Angie Jordanyer misc 1 Each by Does Not Apply route daily. Please provide walker with wheels and stoppers  Carboxymethylcellulose-Glycern (REFRESH OPTIVE) 0.5-0.9 % drop Apply  to eye.  melatonin 5 mg cap capsule Take 1 Cap by mouth nightly.  mv,Ca,min-folic acid-vit K1 (ONE-A-DAY WOMEN'S 50 PLUS) 400-20 mcg tab Take 1 Tab by mouth daily.  glimepiride (AMARYL) 2 mg tablet Take 2 mg by mouth every morning.  bisacodyl (DULCOLAX, BISACODYL,) 10 mg suppository Insert 10 mg into rectum daily as needed (constipation).  amLODIPine (NORVASC) 5 mg tablet TAKE 1 TABLET BY MOUTH ONCE DAILY FOR HTN  
 aspirin delayed-release 81 mg tablet TAKE 1 TABLET BY MOUTH ONCE DAILY FOR HEART HEALTH  
 lovastatin (MEVACOR) 20 mg tablet TAKE 1 TABLET BY MOUTH DAILY IN THE EVENING FOR HYPERLIPEDEMIA  EASY TOUCH INSULIN SAFETY SYR 0.5 mL 30 gauge x 5/16\" syrg Use subcutaneously as directed with insulin.  SOD PHOS,M-B/NA PHOS,DI-BA (FLEET ENEMA RE) Insert 1 Dose into rectum daily as needed (Constipation (if no BM, notify MD)).  polyethylene glycol (MIRALAX) 17 gram packet Take 17 g by mouth two (2) times daily as needed (Constipation).  TRUEPLUS LANCETS 30 gauge misc Use to check blood glucose up to twice daily DX:e11.9 Physical Exam:  
 
Visit Vitals BP (!) 165/69 Pulse (!) 120 Temp 97.3 °F (36.3 °C) Resp 27 SpO2 96% BP Readings from Last 3 Encounters:  
01/31/21 (!) 165/69  
01/08/20 119/56  
08/19/19 132/78 Pulse Readings from Last 3 Encounters:  
01/31/21 (!) 120  
01/08/20 75  
08/19/19 80 Wt Readings from Last 3 Encounters:  
01/08/20 44.5 kg (98 lb) 08/19/19 46.9 kg (103 lb 6.4 oz) 06/19/19 47.2 kg (104 lb) General:  alert, no distress, follows commands Neck:  nontender Lungs:  decreased Heart: irreg, S1, S2 normal, no murmur, click, rub or gallop Abdomen:  abdomen is soft without significant tenderness, masses, organomegaly or guarding Extremities:  extremities normal, atraumatic, no cyanosis or edema Skin: Warm and dry. no hyperpigmentation, vitiligo, or suspicious lesions Neuro: alert, oriented person, affect appropriate, no focal neurological deficits, moves all extremities well, no involuntary movements, reflexes at knee and ankle intact Psych: flat affect Data Review:  
 
Recent Labs  
  01/31/21 
0630 01/30/21 
1253 WBC 6.9 6.7 HGB 14.6 15.1 HCT 40.8 42.8  207 Recent Labs  
  01/30/21 
1253 * K 4.0  
CL 98* CO2 26 * BUN 75* CREA 1.44* CA 9.8 ALB 3.2* ALT 37 Results for orders placed or performed during the hospital encounter of 01/30/21 EKG, 12 LEAD, INITIAL Result Value Ref Range Ventricular Rate 107 BPM  
 Atrial Rate 89 BPM  
 QRS Duration 78 ms Q-T Interval 328 ms QTC Calculation (Bezet) 437 ms Calculated R Axis -20 degrees Calculated T Axis 172 degrees Diagnosis Atrial fibrillation with rapid ventricular response with premature  
ventricular or aberrantly conducted complexes Moderate voltage criteria for LVH, may be normal variant Inferior infarct (cited on or before 02-MAY-2017) Anterior infarct (cited on or before 02-MAY-2017) Abnormal ECG Confirmed by Ora Eldridge MD, Vazquez Virk ([id number]) on 1/30/2021 4:06:27 PM 
  
 
 
All Cardiac Markers in the last 24 hours:   
Lab Results Component Value Date/Time CPK 51 01/30/2021 12:53 PM  
 CKMB 2.1 01/30/2021 12:53 PM  
 CKND1 4.1 (H) 01/30/2021 12:53 PM  
 TROIQ 0.02 01/30/2021 12:53 PM  
 
 
Last Lipid:   
Lab Results Component Value Date/Time  Cholesterol, total 183 05/02/2018 02:15 PM  
 HDL Cholesterol 94 (H) 05/02/2018 02:15 PM  
 LDL, calculated 67.2 05/02/2018 02:15 PM  
 Triglyceride 109 05/02/2018 02:15 PM  
 CHOL/HDL Ratio 1.9 05/02/2018 02:15 PM  
 
 
Signed By: Claudetta Batter, MD   
 January 31, 2021

## 2021-01-31 NOTE — ED NOTES
Repositioned on stretcher to comfort. Assisted patient with dinner. She ate four spoon-full of apple sauce and a couple of spoon-full of jell-O. States, \"I don't want any more. I just feel sick. \"  Denies nausea. States, \"I just feel tired and nothing tastes good at all. \"

## 2021-01-31 NOTE — ED NOTES
Report received from Rosanne, [facility]. Hospitalist at bedside.

## 2021-01-31 NOTE — H&P
Hospitalist Admission History and Physical 
 
NAME:  Patricia Adams :   1926 MRN:   [id number] PCP:  Chang Abel MD 
Date/Time:  2021 Subjective: CHIEF COMPLAINT: Generalized weakness HISTORY OF PRESENT ILLNESS:    
This is a 66-year-old female with a past medical history of diabetes and hypertension who was sent to the ED from [address] with complaints of generalized weakness. Patient is a poor historian and does not provide much history. Patient states that she has a poor appetite and has not been feeling well for around a week. Patient states that she got her Covid vaccine earlier this week. No history of any chest pain or shortness of breath or cough. No history of any fever or chills. No history of any nausea vomiting abdominal pain or urinary complaints. No history of any leg swelling or rash. Patient is hard of hearing. In the ED patient was noted to be hypoxic and chest x-ray was concerning for COVID-19 pneumonia. Patient was also noted to have significant hyperglycemia and was started on an insulin drip. Past Medical History:  
Diagnosis Date  Abnormality of gait  Acute upper respiratory infections of unspecified site  Anemia  Broken shoulder   
 right  Chest pain  Chronic airway obstruction, not elsewhere classified  COPD  Diabetes ([address]  Essential hypertension, benign  Hearing loss  History of tachycardia   
 possible SVT  Hypercholesterolemia  Hyperlipidemia  Hypertension  Incontinence  Mitral valve prolapse  MVP (mitral valve prolapse)  OAB (overactive bladder)  Osteoarthritis   
 spine  Osteoporosis  Pure hypercholesterolemia  Senile osteoporosis  Thyroid disease  Thyrotoxicosis  Type II or unspecified type diabetes mellitus without mention of complication, not stated as uncontrolled  Urinary tract infection, site not specified Past Surgical History:  
Procedure Laterality Date  HX KNEE REPLACEMENT  left  HX OPEN REDUCTION INTERNAL FIXATION Left   
 patellar fracture  HX OTHER SURGICAL Right   
 shoulder surgery  MS XCAPSL CTRC RMVL INSJ IO LENS PROSTH CPLX WO ECP  [id number]  
 bilateral  
 
 
Social History Tobacco Use  Smoking status: Never Smoker  Smokeless tobacco: Never Used Substance Use Topics  Alcohol use: No  
  
 
Family History Problem Relation Age of Onset  Diabetes Mother  Alcohol abuse Father Stacy Eden Arthritis-rheumatoid Sister  Diabetes Brother  Other Brother   
     eye problems  Hypertension Son  Diabetes Son   
Stacy Outkarina Cancer Son Pancreatic cancer  Alcohol abuse Son Allergies Allergen Reactions  Nitrofurantoin Nausea and Vomiting Prior to Admission Medications Prescriptions Last Dose Informant Patient Reported? Taking? Carboxymethylcellulose-Glycern (REFRESH OPTIVE) 0.5-0.9 % drop   Yes No  
Sig: Apply  to eye. EASY TOUCH INSULIN SAFETY SYR 0.5 mL 30 gauge x 16\" syrg   Yes No  
Sig: Use subcutaneously as directed with insulin. SOD PHOS,M-B/NA PHOS,DI-BA (FLEET ENEMA RE)   Yes No  
Sig: Insert 1 Dose into rectum daily as needed (Constipation (if no BM, notify MD)). TRUEPLUS LANCETS 30 gauge misc   No No  
Sig: Use to check blood glucose up to twice daily DX:e11.9 Walker misc   No No  
Si Each by Does Not Apply route daily. Please provide walker with wheels and stoppers  
amLODIPine (NORVASC) 5 mg tablet   No No  
Sig: TAKE 1 TABLET BY MOUTH ONCE DAILY FOR HTN  
aspirin delayed-release 81 mg tablet   No No  
Sig: TAKE 1 TABLET BY MOUTH ONCE DAILY FOR HEART HEALTH  
bisacodyl (DULCOLAX, BISACODYL,) 10 mg suppository   Yes No  
Sig: Insert 10 mg into rectum daily as needed (constipation). glimepiride (AMARYL) 2 mg tablet   Yes No  
Sig: Take 2 mg by mouth every morning.   
lovastatin (MEVACOR) 20 mg tablet   No No  
 Sig: TAKE 1 TABLET BY MOUTH DAILY IN THE EVENING FOR HYPERLIPEDEMIA  
melatonin 5 mg cap capsule   No No  
Sig: Take 1 Cap by mouth nightly. mv,Ca,min-folic acid-vit K1 (ONE-A-DAY WOMEN'S 50 PLUS) 400-20 mcg tab   No No  
Sig: Take 1 Tab by mouth daily. polyethylene glycol (MIRALAX) 17 gram packet   Yes No  
Sig: Take 17 g by mouth two (2) times daily as needed (Constipation). Facility-Administered Medications: None REVIEW OF SYSTEMS:   
Review of Systems GENERAL: Patient is awake, mildly confused and easily disoriented HEENT: No change in vision, no earache, tinnitus, sore throat or sinus congestion. NECK: No pain or stiffness. PULMONARY: No shortness of breath, cough or wheeze. Cardiovascular: no pnd or orthopnea, no CP GASTROINTESTINAL: No abdominal pain, nausea, vomiting or diarrhea, melena or bright red blood per rectum. GENITOURINARY: No urinary frequency, urgency, hesitancy or dysuria. MUSCULOSKELETAL: No back pain, no leg pain DERMATOLOGIC: No rash, no itching, no lesions. ENDOCRINE: No polyuria, polydipsia, no heat or cold intolerance. No recent change in weight. HEMATOLOGICAL: No anemia or easy bruising or bleeding. NEUROLOGIC: No headache, seizures, numbness, tingling or focal weakness. Patient complains of generalized weakness Objective: VITALS:   
Visit Vitals BP (!) 131/97 (BP 1 Location: Left upper arm, BP Patient Position: Sitting) Pulse (!) 130 Temp 97.3 °F (36.3 °C) Resp (!) 34 SpO2 96% Temp (24hrs), Av.3 °F (36.3 °C), Min:97.3 °F (36.3 °C), Max:97.3 °F (36.3 °C) When I was in the patient's room patient's heart rate on the telemetry monitor was running between 100 and 110 and patient's respiratory rate was running around 20 PHYSICAL EXAM:  
General:    Lying in bed, appears a little anxious HEENT:  Pupils equal.  Sclera anicteric. Conjunctiva pink. Mucous membranes Moist, no ear or nasal discharge Neck:  Supple. Trachea midline. No accessory muscle use. No thyromegaly. No jugular venous distention, no carotid bruit CV:                  Regular rate and rhythm. S1S2+ Lungs:   Coarse breath sounds bilaterally Abdomen:   Soft, non-tender. Not distended. Bowel sounds normal.  
Extremities: No cyanosis. No edema. Pulses 1+ b/l Neurologic: Awake, easily disoriented. Follows simple commands, responds appropriately. Moves all 4 extremities, hard of hearing Skin:                Warm and dry. No rashes. LAB DATA REVIEWED:   
No components found for: [address] [place] [address] Recent Labs  
  01/30/21 
1253 * K 4.0  
CL 98* CO2 26 BUN 75* CREA 1.44* * CA 9.8 ALB 3.2* WBC 6.7 HGB 15.1 HCT 42.8  CBC WITH AUTOMATED DIFF Collection Time: 01/30/21 12:53 PM  
Result Value Ref Range WBC 6.7 4.6 - 13.2 K/uL  
 RBC 4.91 4.20 - 5.30 M/uL  
 HGB 15.1 12.0 - 16.0 g/dL HCT 42.8 35.0 - 45.0 % MCV 87.2 74.0 - 97.0 FL  
 MCH 30.8 24.0 - 34.0 PG  
 MCHC 35.3 31.0 - 37.0 g/dL  
 RDW 13.2 11.6 - 14.5 % PLATELET 651 839 - 663 K/uL MPV 10.8 9.2 - 11.8 FL  
 NEUTROPHILS 84 (H) 40 - 73 % LYMPHOCYTES 5 (L) 21 - 52 % MONOCYTES 11 (H) 3 - 10 % EOSINOPHILS 0 0 - 5 % BASOPHILS 0 0 - 2 %  
 ABS. NEUTROPHILS 5.6 1.8 - 8.0 K/UL  
 ABS. LYMPHOCYTES 0.3 (L) 0.9 - 3.6 K/UL  
 ABS. MONOCYTES 0.8 0.05 - 1.2 K/UL  
 ABS. EOSINOPHILS 0.0 0.0 - 0.4 K/UL  
 ABS. BASOPHILS 0.0 0.0 - 0.1 K/UL  
 DF AUTOMATED CXR Results  (Last 48 hours) 01/30/21 1426  XR CHEST PORT Final result Impression:  1. Mild patchy bilateral pulmonary opacities may be due to infiltrates or  
atelectasis or fibrosis. Narrative:  EXAM: XR CHEST PORT  
   
CLINICAL INDICATION/HISTORY : Sepsis. COMPARISON: May 2, 2017 TECHNIQUE: 1 VIEWS  
   
FINDINGS:   
EKG leads and wires overlie the chest  
 There are mild patchy bilateral pulmonary opacities. .  
*Chronic vascular calcifications. No cardiomegaly. [place] Founds No evidence of pleural effusion. Assessment/Plan:  
  
COVID-19 pneumonia Hypoxia Uncontrolled type 2 diabetes with hyperglycemia Hypertension Advanced age Hard of hearing 
______________________________________________ PLAN:   
Continue dexamethasone and remdesivir Continue oxygen, continue vitamin supplements Incentive spirometry, monitor Covid related markers Patient was placed on a insulin drip in the ED. I will transition to Lantus and sliding scale insulin at this time Monitor blood pressure, continue amlodipine Rest depending on patient's further hospital course. Patient appears mildly confused and is very hard of hearing. I tried to explain the plan of care to her. I tried to contact her children listed on the chart but it states that the numbers do not work. Palliative care consulted Discussed with RN Prophylaxis:  []Lovenox  []Coumadin  [x]Hep SQ  []SCDs  []H2B/PPI 
 
___________________________________________________ Care Plan discussed with: 
  [x]Patient   []Family    []ED Care Manager  [x]ED Doc   []Specialist : 
I tried to call the 2 phone numbers listed in the chart for patient's children but it said that those numbers do not work. Total Time Coordinating Admission: 70  minutes []Total Critical Care Time:    
___________________________________________________ Admitting Physician: Figueroa Adhikari MD

## 2021-01-31 NOTE — ED NOTES
Pt desated. Pt placed on nonrebreather while awaiting resp. Provider made aware. Orders for hi-flow received.

## 2021-02-01 NOTE — PROGRESS NOTES
[facility] - Progress Note Admit Date: 1/30/2021 Assessment:  
 
Hospital Problems  Date Reviewed: 8/12/2018 Codes Class Noted POA Hypoxia ICD-10-CM: R09.02 
ICD-9-CM: 799.02  1/30/2021 Unknown Pneumonia due to COVID-19 virus ICD-10-CM: U07.1, J12.82 ICD-9-CM: 480.8  1/30/2021 Unknown  
   
  
 
-New A.fib w/RVR, likely trigger stress response 
-Covid + pneumonia 
-h/o mild aortic stenosis with mean gradient 25 mmHg and MATTHEW 1.1 cm^2 by echo in 2017 
-Acute renal failure 
-h/o HTN 
-h/o diabetes, HgbA1c 9.5 1/20/21 
-h/o dyslipidemia 
-h/o COPD 
-Thyroid disorder 
-Advanced age, poor functional status 
  
Previous patient of Dr. Yandy Lopez, no h/o cardiologist or heart disease Plan:  
 
Patient continues to clinically worsen. Little more to offer from cardiac standpoint, DNR status. Notes state possible comfort measures, very appropriate from my standpoint. Subjective: More hypoxic this morning. A.fib rates increased. Objective:  
  
Patient Vitals for the past 8 hrs: 
 Pulse Resp BP SpO2  
02/01/21 0830 (!) 123 26 (!) 110/91 (!) 84 % 02/01/21 0827    (!) 87 % 02/01/21 0815 (!) 133 25 100/72 (!) 85 % 02/01/21 0800 (!) 119 21 130/68 (!) 81 % 02/01/21 0745 (!) 121 23 (!) 141/74 (!) 82 % 02/01/21 0730 (!) 129 25 118/75 (!) 85 % 02/01/21 0715 (!) 123 24 129/64 (!) 87 % 02/01/21 0700 (!) 122 23 131/71 (!) 88 % Patient Vitals for the past 96 hrs: 
 Weight 01/31/21 1543 44.5 kg (98 lb 1.7 oz) No intake or output data in the 24 hours ending 02/01/21 1142 Physical Exam: 
General: mild respiratory distress Neck:  nontender Lungs: decreased Heart:  Tachy, irrge S1, S2 normal, no murmur, click, rub or gallop Abdomen:  abdomen is soft without significant tenderness, masses, organomegaly or guarding Extremities:  extremities normal, atraumatic, no cyanosis or edema Data Review:  
 
Labs: Results:  
   
Chemistry Recent Labs 02/01/21 
0606 01/31/21 
1140 01/30/21 
1253 GLU 91 254* 550* * 142 134* K 3.1* 4.5 4.0  
* 110 98* CO2 28 26 26 BUN 41* 46* 75* CREA 0.87 0.93 1.44* CA 8.4* 8.6 9.8 MG 1.8 1.9  --   
AGAP 6 6 10 BUCR 47* 49* 52* AP 52 48 63  
TP 6.5 6.7 8.0 ALB 2.3* 2.5* 3.2*  
GLOB 4.2* 4.2* 4.8* AGRAT 0.5* 0.6* 0.7* CBC w/Diff Recent Labs 02/01/21 
0606 01/31/21 
0630 01/30/21 
1253 WBC 12.4 6.9 6.7  
RBC 4.56 4.48 4.91  
HGB 14.5 14.6 15.1 HCT 41.0 40.8 42.8  210 207 GRANS 88* 83* 84* LYMPH 4* 5* 5* EOS 0 0 0 Cardiac Enzymes No results found for: CPK, CK, CKMMB, CKMB, RCK3, CKMBT, CKNDX, CKND1, YASIR, TROPT, TROIQ, MUSA, TROPT, TNIPOC, BNP, BNPP Coagulation Recent Labs 02/01/21 
0606 01/31/21 
2359 APTT 148.1* 172.6* Lipid Panel Lab Results Component Value Date/Time Cholesterol, total 183 05/02/2018 02:15 PM  
 HDL Cholesterol 94 (H) 05/02/2018 02:15 PM  
 LDL, calculated 67.2 05/02/2018 02:15 PM  
 VLDL, calculated 21.8 05/02/2018 02:15 PM  
 Triglyceride 109 05/02/2018 02:15 PM  
 CHOL/HDL Ratio 1.9 05/02/2018 02:15 PM  
  
BNP No results found for: BNP, BNPP, XBNPT Liver Enzymes Recent Labs 02/01/21 
5758 TP 6.5 ALB 2.3* AP 52 Digoxin Thyroid Studies Lab Results Component Value Date/Time TSH 0.35 (L) 08/19/2019 01:28 PM  
    
 
Signed By: Gabe Cowden, MD   
 February 1, 2021

## 2021-02-01 NOTE — PROGRESS NOTES
[facility] Hospitalist Group Progress Note Patient: Chari Romero Age: 80 y.o. : 1926 MR#: [id number] SSN: [id number] Date: 2021 Subjective:  
Poor prognosis, hypoxia. Palliative care informed of transition to comfort measures. Review of Systems Unable to perform ROS: Acuity of condition Assessment/Plan:  
1. COVID-19 pneumonia 2. Hypoxia secondary to COVID PNA 3. afib with RVR, new onset in setting of COVID PNA 4. DMT2 with hyperglycemia 5. Advanced Age 6. Hard of hearing 7. HTN, HLD 8. History of mild aortic stenosis 9. History of COPD Plan 1. Palliative care following. Plan for comfort measures. Hospice referral. Palliative care spoke with scotty Negrete [phone] to discuss care decisions. 2. Cardiology consulted for afib with RVR. Agree with DNR status and comfort measures 3. HFNC 40L with hypoxia 4. Team will continue to follow and fulfill upcoming care decisions. Additional Notes:   
 
Case discussed with:  [x]Patient  [x]Family  [x]Nursing  []Case Management DVT Prophylaxis:  []Lovenox  []Hep SQ  []SCDs  []Coumadin   [x]On Heparin gtt Objective:  
VS:  
Visit Vitals BP (!) 110/91 Pulse (!) 123 Temp 98.9 °F (37.2 °C) Resp 26 Ht 5' 1\" (1.549 m) Wt 44.5 kg (98 lb 1.7 oz) SpO2 (!) 84% BMI 18.54 kg/m² Tmax/24hrs: Temp (24hrs), Av.9 °F (37.2 °C), Min:98.9 °F (37.2 °C), Max:98.9 °F (37.2 °C) No intake or output data in the 24 hours ending 21 1152 General:  Alert, NAD Cardiovascular:  Vernette Marshalel Pulmonary:  LSC throughout; respiratory effort WNL 
GI:  +BS in all four quadrants, soft, non-tender Extremities:  No edema; 2+ dorsalis pedis pulses bilaterally Neuro: [place], alert to self Labs:   
Recent Results (from the past 24 hour(s)) GLUCOSE, POC Collection Time: 21  5:03 PM  
Result Value Ref Range Glucose (POC) 190 (H) 70 - 110 mg/dL PTT Collection Time: 01/31/21  5:05 PM  
Result Value Ref Range aPTT 29.9 23.0 - 36.4 SEC GLUCOSE, POC Collection Time: 01/31/21 11:06 PM  
Result Value Ref Range Glucose (POC) 178 (H) 70 - 110 mg/dL PTT Collection Time: 01/31/21 11:59 PM  
Result Value Ref Range aPTT 172.6 (HH) 23.0 - 36.4 SEC  
C REACTIVE PROTEIN, QT Collection Time: 02/01/21  6:06 AM  
Result Value Ref Range C-Reactive protein 5.3 (H) 0 - 0.3 mg/dL D DIMER Collection Time: 02/01/21  6:06 AM  
Result Value Ref Range D DIMER 1.82 (H) <0.46 ug/ml(FEU) PROCALCITONIN Collection Time: 02/01/21  6:06 AM  
Result Value Ref Range Procalcitonin 0.15 ng/mL METABOLIC PANEL, COMPREHENSIVE Collection Time: 02/01/21  6:06 AM  
Result Value Ref Range Sodium 146 (H) 136 - 145 mmol/L Potassium 3.1 (L) 3.5 - 5.5 mmol/L Chloride 112 (H) 100 - 111 mmol/L  
 CO2 28 21 - 32 mmol/L Anion gap 6 3.0 - 18 mmol/L Glucose 91 74 - 99 mg/dL BUN 41 (H) 7.0 - 18 MG/DL Creatinine 0.87 0.6 - 1.3 MG/DL  
 BUN/Creatinine ratio 47 (H) 12 - 20 GFR est AA >60 >60 ml/min/1.73m2 GFR est non-AA >60 >60 ml/min/1.73m2 Calcium 8.4 (L) 8.5 - 10.1 MG/DL Bilirubin, total 0.4 0.2 - 1.0 MG/DL  
 ALT (SGPT) 25 13 - 56 U/L  
 AST (SGOT) 35 10 - 38 U/L Alk. phosphatase 52 45 - 117 U/L Protein, total 6.5 6.4 - 8.2 g/dL Albumin 2.3 (L) 3.4 - 5.0 g/dL Globulin 4.2 (H) 2.0 - 4.0 g/dL A-G Ratio 0.5 (L) 0.8 - 1.7    
CBC WITH AUTOMATED DIFF Collection Time: 02/01/21  6:06 AM  
Result Value Ref Range WBC 12.4 4.6 - 13.2 K/uL  
 RBC 4.56 4.20 - 5.30 M/uL  
 HGB 14.5 12.0 - 16.0 g/dL HCT 41.0 35.0 - 45.0 % MCV 89.9 74.0 - 97.0 FL  
 MCH 31.8 24.0 - 34.0 PG  
 MCHC 35.4 31.0 - 37.0 g/dL  
 RDW 13.5 11.6 - 14.5 % PLATELET 915 292 - 538 K/uL MPV 10.5 9.2 - 11.8 FL  
 NEUTROPHILS 88 (H) 40 - 73 % LYMPHOCYTES 4 (L) 21 - 52 % MONOCYTES 8 3 - 10 % EOSINOPHILS 0 0 - 5 % BASOPHILS 0 0 - 2 % ABS. NEUTROPHILS 10.9 (H) 1.8 - 8.0 K/UL  
 ABS. LYMPHOCYTES 0.5 (L) 0.9 - 3.6 K/UL  
 ABS. MONOCYTES 1.0 0.05 - 1.2 K/UL  
 ABS. EOSINOPHILS 0.0 0.0 - 0.4 K/UL  
 ABS. BASOPHILS 0.0 0.0 - 0.1 K/UL  
 DF AUTOMATED    
PTT Collection Time: 02/01/21  6:06 AM  
Result Value Ref Range aPTT 148.1 (HH) 23.0 - 36.4 SEC MAGNESIUM Collection Time: 02/01/21  6:06 AM  
Result Value Ref Range Magnesium 1.8 1.6 - 2.6 mg/dL GLUCOSE, POC Collection Time: 02/01/21  8:37 AM  
Result Value Ref Range Glucose (POC) 76 70 - 110 mg/dL GLUCOSE, POC Collection Time: 02/01/21 11:22 AM  
Result Value Ref Range Glucose (POC) 113 (H) 70 - 110 mg/dL Signed By: Karolee Bosworth, NP February 1, 2021

## 2021-02-01 NOTE — ED NOTES
Shift report given to [facility] AKASH, RN. Patient resting on stretcher, with eyes closed. Respirations regular and unlabored. Heparin drip infusing, via pump. VSS, refer to flow sheet.

## 2021-02-01 NOTE — PROGRESS NOTES
Discussed with Palliative care - pt has been transitioned to comfort measures, Will continue to follow.

## 2021-02-01 NOTE — PROGRESS NOTES
PT orders received and chart was reviewed. Attempted to see patient however her heart rate is between 120-140bpm at rest with O2 sat between 84-88% and green emesis bag in her hand. Pt was scooted up in the bed with total assist to be more upright but otherwise is not appropriate for PT as this time. Spoke with patient's nurse and let her know above. Will continue to follow.   Cele Canas, PT

## 2021-02-01 NOTE — ROUTINE PROCESS
TRANSFER - IN REPORT: 
 
Verbal report received from Katrin Lynn RN(name) on Meryle Glow  being received from ED(unit) for routine progression of care Report consisted of patients Situation, Background, Assessment and  
Recommendations(SBAR). Information from the following report(s) SBAR, Kardex and Recent Results was reviewed with the receiving nurse. Opportunity for questions and clarification was provided. Assessment completed upon patients arrival to unit and care assumed.

## 2021-02-01 NOTE — ED NOTES
Morning labs were drawn and the lab ran the patient's PTT to early. Patient's PTT is 148 at this time. Lab was notified that PTT was ran to early and was not due until 0900. Lab stated to just redraw an and send a new blue top at 0900.

## 2021-02-01 NOTE — CONSULTS
[facility] [phone] ([phone] [facility]: [phone] [facility]: [phone] Patient Name: Nadia Gomez YOB: 1926 Date of Initial Consult: 2/1/2021 Reason for Consult: care decisions Requesting Provider: Dr Apolinar Rodriguez Primary Care Physician: Chayo Rico MD 
  
 SUMMARY:  
Nadia Gomez is a 80y.o. year old with a past history of diabetes, hypertension, COPD, diabetes, osteoporosis, who was admitted on 1/30/2021 from [facility]  with a diagnosis of COVID 19 pneumonia. Current medical issues leading to Palliative Medicine involvement include: 80year old female who lives in assistive living. She recently received the COVID 19 vaccine, developed weakness, nausea. She is positive for COVID 19 pneumonia. Palliative medicine is consulted for care decisions. PALLIATIVE DIAGNOSES:  
1. Goals of care 2. COVID 19 pneumonia 3. Hypoxia 4. Debility  PLAN:  
 1. Goals of care Ms Jeff Garcia seen in full PPE. She is alert, confused, was not able to follow any conversation, really just said over and over again, please don't force me, I feel so bad. ... We called the listed numbers, Spoke with Bolivar Bach who is her niece. Bolivar Bach shared Nyasia's son has passed Milad Cheers). There are 2 grandson's but are estranged from patient with no known contact. Bolivar Bach tells she is legal next of kin. She also has financial power of . Spoke with Bolivar Isreal along with Ms Gaurang Reveles, JOHANNA. Shared with Alessio Barillas despite high flow and aggressive treatment is not responding as well as we had hoped. Goals of care and care decisions. Benefits and burdens of resuscitation discussed at length, as it relates to [facility] advanced age, increasing oxygen requirements. Bolivar Bach agreed CPR and intubation would not help her aunt live a \" better life\". Unfortunately patient despite efforts is not responding well to treatment. Discussed comfort measures going forward including stopping all IVF's IVAB, weaning oxygen to nasal cannula, stopping labs draws, x rays. Symptom management discussed including using Roxanol and Ativan for dyspnea and pain. Jessica agreeable to DNR/DNI, comfort measures, no feeding tubes. Goals of care DNR/DNI comfort measures, no feeding tubes. Attending and BSRN aware of comfort measures and goals of care change. Comfort order set in place. We discussed hospice at facility with Arcenio Hernandez who was agreeable to this support. Hospice consult placed. 2. COVID 19 pneumonia  ID has been consulted, was on IVAB. On high flow with desats noted. Now on comfort measures, will wean high flow use Roxanol to help with dyspnea. 3. Hypoxia evident on high flow, will d/c pulse ox monitoring. Will monitor patient for distress and dyspnea 4. Debility lives at assistive living. Confused here, not sure of her baseline. Her current PPS is low at 30 indicating an overall poor prognosis with ensuing debility 5. Initial consult note routed to primary continuity provider 6. Communicated plan of care with: Palliative IDT, attending, Bradford Spurling, Catia Malagon GOALS OF CARE: comfort measures Patient/Health Care Proxy Stated Goals: Comfort TREATMENT PREFERENCES:  
Code Status: DNR/ DNI Advance Care Planning: 
[] The [facility] Interdisciplinary Team has updated the ACP Navigator with [address] and Patient Capacity Primary Decision [facility] (Health Care Agent): Catia Malagon Medical Interventions: Comfort measures Artificially Administered Nutrition: No feeding tube Other: As far as possible, the palliative care team has discussed with patient / health care proxy about goals of care / treatment preferences for patient. HISTORY:  
 
History obtained from: chart and niece CHIEF COMPLAINT: COVID 19 pneumonia HPI/SUBJECTIVE: The patient is:  
[] Verbal and participatory [x] Non-participatory due to: confusion Please see summary Clinical Pain Assessment (nonverbal scale for nonverbal patients): Clinical Pain Assessment Severity: 0 Activity (Movement): Lying quietly, normal position Duration: for how long has pt been experiencing pain (e.g., 2 days, 1 month, years) Frequency: how often pain is an issue (e.g., several times per day, once every few days, constant) FUNCTIONAL ASSESSMENT:  
 
Palliative Performance Scale (PPS): PPS: 30 
 
ECOG 
ECOG Status : Completely disabled PSYCHOSOCIAL/SPIRITUAL SCREENING:  
  
Any spiritual / Religion concerns: not able to assess for patient  
[] Yes /  [] No 
 
Caregiver Burnout: 
[] Yes /  [] No /  [x] No Caregiver Present Anticipatory grief assessment:  Unable to assess  
[] Normal  / [] Maladaptive REVIEW OF SYSTEMS:  
 
Positive and pertinent negative findings in ROS are noted above in HPI. The following systems were [] reviewed / [x] unable to be reviewed as noted in HPI Other findings are noted below. Systems: constitutional, ears/nose/mouth/throat, respiratory, gastrointestinal, genitourinary, musculoskeletal, integumentary, neurologic, psychiatric, endocrine. Positive findings noted below. Modified ESAS Completed by: provider Pain: 0 Nausea: 7 Dyspnea: 3 PHYSICAL EXAM:  
 
Wt Readings from Last 3 Encounters:  
01/31/21 44.5 kg (98 lb 1.7 oz) 01/08/20 44.5 kg (98 lb) 08/19/19 46.9 kg (103 lb 6.4 oz) Blood pressure 139/85, pulse (!) 129, temperature 97.8 °F (36.6 °C), resp. rate 21, height 5' 1\" (1.549 m), weight 44.5 kg (98 lb 1.7 oz), SpO2 (!) 87 %. Pain: 
Pain Scale 1: Numeric (0 - 10) Pain Intensity 1: 0 Last bowel movement: none recorded Constitutional: ill appearing, elderly female uncomfortable appearing due to nausea in mild distress ENMT: high flow oxygen in place Cardiovascular: tachy, irregular Respiratory: appears to be in mild distress Skin: warm, dry Neurologic: alert but not following commands for me, Repeats over and over \" please don't force me, I feel so bad\" Psychiatric: full affect, no hallucinations HISTORY:  
 
Active Problems: Hypoxia (1/30/2021) Pneumonia due to COVID-19 virus (1/30/2021) Past Medical History:  
Diagnosis Date  Abnormality of gait  Acute upper respiratory infections of unspecified site  Anemia  Broken shoulder   
 right  Chest pain  Chronic airway obstruction, not elsewhere classified  COPD  Diabetes ([facility] [address]  Essential hypertension, benign  Hearing loss  History of tachycardia   
 possible SVT  Hypercholesterolemia  Hyperlipidemia  Hypertension  Incontinence  Mitral valve prolapse  MVP (mitral valve prolapse)  OAB (overactive bladder)  Osteoarthritis   
 spine  Osteoporosis  Pure hypercholesterolemia  Senile osteoporosis  Thyroid disease  Thyrotoxicosis  Type II or unspecified type diabetes mellitus without mention of complication, not stated as uncontrolled  Urinary tract infection, site not specified Past Surgical History:  
Procedure Laterality Date  HX KNEE REPLACEMENT  left  HX OPEN REDUCTION INTERNAL FIXATION Left   
 patellar fracture  HX OTHER SURGICAL Right   
 shoulder surgery  DE XCAPSL CTRC RMVL INSJ IO LENS PROSTH CPLX WO ECP  [id number]  
 bilateral  
  
Family History Problem Relation Age of Onset  Diabetes Mother  Alcohol abuse Father Augustine Arthritis-rheumatoid Sister  Diabetes Brother  Other Brother   
     eye problems  Hypertension Son  Diabetes Son   
Augustine Cancer Son Pancreatic cancer  Alcohol abuse Son History reviewed, no pertinent family history. Social History Tobacco Use  Smoking status: Never Smoker  Smokeless tobacco: Never Used Substance Use Topics  Alcohol use: No  
 
Allergies Allergen Reactions  Nitrofurantoin Nausea and Vomiting Current Facility-Administered Medications Medication Dose Route Frequency  LORazepam (INTENSOL) 2 mg/mL oral concentrate 0.5 mg  0.5 mg Oral Q4H PRN  
 morphine (ROXANOL) concentrated oral syringe 5 mg  5 mg Oral Q2H PRN  
 melatonin (rapid dissolve) tablet 5 mg  5 mg Oral QHS  sodium chloride (NS) flush 5-10 mL  5-10 mL IntraVENous PRN  
 ascorbic acid (vitamin C) (VITAMIN C) tablet 250 mg  250 mg Oral BID  cholecalciferol (VITAMIN D3) (1000 Units /25 mcg) tablet 2,000 Units  2,000 Units Oral DAILY  zinc sulfate (ZINCATE) 220 (50) mg capsule 1 Cap  1 Cap Oral DAILY  sodium chloride (NS) flush 5-40 mL  5-40 mL IntraVENous Q8H  
 sodium chloride (NS) flush 5-40 mL  5-40 mL IntraVENous PRN  
 acetaminophen (TYLENOL) tablet 650 mg  650 mg Oral Q6H PRN Or  
 acetaminophen (TYLENOL) suppository 650 mg  650 mg Rectal Q6H PRN  polyethylene glycol (MIRALAX) packet 17 g  17 g Oral DAILY PRN  
  promethazine (PHENERGAN) tablet 12.5 mg  12.5 mg Oral Q6H PRN Or  
 ondansetron (ZOFRAN) injection 4 mg  4 mg IntraVENous Q6H PRN  
 amLODIPine (NORVASC) tablet 5 mg  5 mg Oral DAILY  albuterol (PROVENTIL HFA, VENTOLIN HFA, PROAIR HFA) inhaler 2 Puff  2 Puff Inhalation Q4H PRN Current Outpatient Medications Medication Sig Denys Hipps misc 1 Each by Does Not Apply route daily. Please provide walker with wheels and stoppers  Carboxymethylcellulose-Glycern (REFRESH OPTIVE) 0.5-0.9 % drop Apply  to eye.  melatonin 5 mg cap capsule Take 1 Cap by mouth nightly.  mv,Ca,min-folic acid-vit K1 (ONE-A-DAY WOMEN'S 50 PLUS) 400-20 mcg tab Take 1 Tab by mouth daily.  glimepiride (AMARYL) 2 mg tablet Take 2 mg by mouth every morning.  bisacodyl (DULCOLAX, BISACODYL,) 10 mg suppository Insert 10 mg into rectum daily as needed (constipation).  amLODIPine (NORVASC) 5 mg tablet TAKE 1 TABLET BY MOUTH ONCE DAILY FOR HTN  
 aspirin delayed-release 81 mg tablet TAKE 1 TABLET BY MOUTH ONCE DAILY FOR HEART HEALTH  
 lovastatin (MEVACOR) 20 mg tablet TAKE 1 TABLET BY MOUTH DAILY IN THE EVENING FOR HYPERLIPEDEMIA  EASY TOUCH INSULIN SAFETY SYR 0.5 mL 30 gauge x 5/16\" syrg Use subcutaneously as directed with insulin.  SOD PHOS,M-B/NA PHOS,DI-BA (FLEET ENEMA RE) Insert 1 Dose into rectum daily as needed (Constipation (if no BM, notify MD)).  polyethylene glycol (MIRALAX) 17 gram packet Take 17 g by mouth two (2) times daily as needed (Constipation).  TRUEPLUS LANCETS 30 gauge misc Use to check blood glucose up to twice daily DX:e11.9 LAB AND IMAGING FINDINGS:  
 
Lab Results Component Value Date/Time WBC 12.4 02/01/2021 06:06 AM  
 HGB 14.5 02/01/2021 06:06 AM  
 PLATELET 284 62/68/0166 06:06 AM  
 
Lab Results Component Value Date/Time  Sodium 146 (H) 02/01/2021 06:06 AM  
 Potassium 3.1 (L) 02/01/2021 06:06 AM  
 Chloride 112 (H) 02/01/2021 06:06 AM  
 CO2 28 02/01/2021 06:06 AM  
 BUN 41 (H) 02/01/2021 06:06 AM  
 Creatinine 0.87 02/01/2021 06:06 AM  
 Calcium 8.4 (L) 02/01/2021 06:06 AM  
 Magnesium 1.8 02/01/2021 06:06 AM  
 Phosphorus 2.6 09/15/2011 09:10 PM  
  
Lab Results Component Value Date/Time Alk. phosphatase 52 02/01/2021 06:06 AM  
 Protein, total 6.5 02/01/2021 06:06 AM  
 Albumin 2.3 (L) 02/01/2021 06:06 AM  
 Globulin 4.2 (H) 02/01/2021 06:06 AM  
 
Lab Results Component Value Date/Time INR 1.0 06/10/2017 10:30 AM  
 Prothrombin time 12.6 06/10/2017 10:30 AM  
 aPTT 148.1 (HH) 02/01/2021 06:06 AM  
  
Lab Results Component Value Date/Time Iron 31 (L) 05/05/2017 03:40 AM  
 TIBC 240 (L) 05/05/2017 03:40 AM  
 Iron % saturation 13 05/05/2017 03:40 AM  
  
No results found for: PH, PCO2, PO2 No components found for: [place] Lab Results Component Value Date/Time CK 51 01/30/2021 12:53 PM  
 CK - MB 2.1 01/30/2021 12:53 PM  
  
 
   
 
Total time: 70 minutes Counseling / coordination time, spent as noted above: 60 minutes  
> 50% counseling / coordination: yes with niece on end of life issues Prolonged service was provided for  []30 min   []75 min in face to face time in the presence of the patient, spent as noted above. Time Start:  
Time End:

## 2021-02-01 NOTE — CONSULTS
Infectious Disease Consultation Note Reason: Acute hypoxic respiratory failure, severe COVID-19 pneumonia Current abx Prior abx Ceftriaxone, azithromycin since 1/31/2021 Lines:  
 
 
Assessment : 
 
49-year-old female with a past medical history of uncontrolled type II diabetes-last hemoglobin A1c 9.5 on 1/30/2021,hypertension who was sent to the ED from [address] [place] [address] [facility] on 1/30/2021 with complaints of generalized weakness. Status post Covid vaccine 1 week prior to presentation Clinical presentation consistent with acute hypoxic respiratory failure (present on admission) due to severe COVID-19 pneumonia Patient has significant worsening hypoxia and oxygen saturation were dropping in the 80s while on high flow oxygen at 40 L. Recommendations: 1. Continue Decadron, remdesivir for now 2. Agree with plans to consult palliative care since patient is at high risk of clinical deterioration 3. Monitor CRP, D-dimer, procalcitonin 4. Titrate oxygen as tolerated Thank you for consultation request. Above plan was discussed in details with patient, primary team.. Please call me if any further questions or concerns. Will continue to participate in the care of this patient.  
HPI: 
 
 51-year-old female with a past medical history of uncontrolled type II diabetes-last hemoglobin A1c 9.5 on 1/30/2021,hypertension who was sent to the ED from [address] on 1/30/2021 with complaints of generalized weakness. Patient is a poor historian and does not provide much history. Patient states that she had a poor appetite and has not been feeling well for around a week. Patient states that she got her Covid vaccine earlier this week. No history of any chest pain or shortness of breath or cough. No history of any fever or chills. No history of any nausea vomiting abdominal pain or urinary complaints. No history of any leg swelling or rash. Patient is hard of hearing. In the ED patient was noted to be hypoxic and chest x-ray was concerning for COVID-19 pneumonia. Patient was also noted to have significant hyperglycemia and was started on an insulin drip. Patient was given Decadron in the emergency room. I was consulted for further recommendations. Patient was hypoxic and required supplemental oxygen. I recommended to start patient on remdesivir. I am seeing the patient for further management. Patient was on high flow oxygen when I evaluated her. Her oxygen saturation was dropping to the 80s on 40 L high flow. Patient stated that she was thirsty and wanted to drink water. She denied any chest pain, abdominal pain. Past Medical History:  
Diagnosis Date  Abnormality of gait  Acute upper respiratory infections of unspecified site  Anemia  Broken shoulder   
 right  Chest pain  Chronic airway obstruction, not elsewhere classified  COPD  Diabetes ([address]  Essential hypertension, benign  Hearing loss  History of tachycardia   
 possible SVT  Hypercholesterolemia  Hyperlipidemia  Hypertension  Incontinence  Mitral valve prolapse  MVP (mitral valve prolapse)  OAB (overactive bladder)  Osteoarthritis   
 spine  Osteoporosis  Pure hypercholesterolemia  Senile osteoporosis  Thyroid disease  Thyrotoxicosis  Type II or unspecified type diabetes mellitus without mention of complication, not stated as uncontrolled  Urinary tract infection, site not specified Past Surgical History:  
Procedure Laterality Date  HX KNEE REPLACEMENT  left  HX OPEN REDUCTION INTERNAL FIXATION Left   
 patellar fracture  HX OTHER SURGICAL Right   
 shoulder surgery  RI XCAPSL CTRC RMVL INSJ IO LENS PROSTH CPLX WO ECP  [id number]  
 bilateral  
 
 
Patient's Medications Start Taking No medications on file Continue Taking AMLODIPINE (NORVASC) 5 MG TABLET    TAKE 1 TABLET BY MOUTH ONCE DAILY FOR HTN  
 ASPIRIN DELAYED-RELEASE 81 MG TABLET    TAKE 1 TABLET BY MOUTH ONCE DAILY FOR HEART HEALTH  
 BISACODYL (DULCOLAX, BISACODYL,) 10 MG SUPPOSITORY    Insert 10 mg into rectum daily as needed (constipation). CARBOXYMETHYLCELLULOSE-GLYCERN (REFRESH OPTIVE) 0.5-0.9 % DROP    Apply  to eye. EASY TOUCH INSULIN SAFETY SYR 0.5 ML 30 GAUGE X 5/16\" SYRG    Use subcutaneously as directed with insulin. GLIMEPIRIDE (AMARYL) 2 MG TABLET    Take 2 mg by mouth every morning. LOVASTATIN (MEVACOR) 20 MG TABLET    TAKE 1 TABLET BY MOUTH DAILY IN THE EVENING FOR HYPERLIPEDEMIA MELATONIN 5 MG CAP CAPSULE    Take 1 Cap by mouth nightly. MV,CA,MIN-FOLIC ACID-VIT K1 (ONE-A-DAY WOMEN'S 50 PLUS) 400-20 MCG TAB    Take 1 Tab by mouth daily. POLYETHYLENE GLYCOL (MIRALAX) 17 GRAM PACKET    Take 17 g by mouth two (2) times daily as needed (Constipation). SOD PHOS,M-B/NA PHOS,DI-BA (FLEET ENEMA RE)    Insert 1 Dose into rectum daily as needed (Constipation (if no BM, notify MD)). TRUEPLUS LANCETS 30 GAUGE MISC    Use to check blood glucose up to twice daily DX:e11.9 WALKER MISC    1 Each by Does Not Apply route daily. Please provide walker with wheels and stoppers These Medications have changed No medications on file Stop Taking No medications on file Current Facility-Administered Medications Medication Dose Route Frequency  LORazepam (INTENSOL) 2 mg/mL oral concentrate 0.5 mg  0.5 mg Oral Q4H PRN  
 morphine (ROXANOL) 100 mg/5 mL (20 mg/mL) concentrated solution 5 mg  5 mg Oral Q2H PRN  
 melatonin (rapid dissolve) tablet 5 mg  5 mg Oral QHS  heparin 25,000 units in D5W 250 ml infusion  18-36 Units/kg/hr IntraVENous TITRATE  sodium chloride (NS) flush 5-10 mL  5-10 mL IntraVENous PRN  
 cefTRIAXone (ROCEPHIN) 2 g in sterile water (preservative free) 20 mL IV syringe  2 g IntraVENous Q24H  
 azithromycin (ZITHROMAX) 500 mg in 0.9% sodium chloride 250 mL (VIAL-MATE)  500 mg IntraVENous Q24H  
 remdesivir 100 mg in 0.9% sodium chloride 250 mL IVPB  100 mg IntraVENous Q24H  
 dexamethasone (DECADRON) 4 mg/mL injection 6 mg  6 mg IntraVENous Q24H  
 ascorbic acid (vitamin C) (VITAMIN C) tablet 250 mg  250 mg Oral BID  cholecalciferol (VITAMIN D3) (1000 Units /25 mcg) tablet 2,000 Units  2,000 Units Oral DAILY  zinc sulfate (ZINCATE) 220 (50) mg capsule 1 Cap  1 Cap Oral DAILY  sodium chloride (NS) flush 5-40 mL  5-40 mL IntraVENous Q8H  
 sodium chloride (NS) flush 5-40 mL  5-40 mL IntraVENous PRN  
 acetaminophen (TYLENOL) tablet 650 mg  650 mg Oral Q6H PRN Or  
 acetaminophen (TYLENOL) suppository 650 mg  650 mg Rectal Q6H PRN  polyethylene glycol (MIRALAX) packet 17 g  17 g Oral DAILY PRN  promethazine (PHENERGAN) tablet 12.5 mg  12.5 mg Oral Q6H PRN Or  
 ondansetron (ZOFRAN) injection 4 mg  4 mg IntraVENous Q6H PRN  
 aspirin delayed-release tablet 81 mg  81 mg Oral DAILY  atorvastatin (LIPITOR) tablet 10 mg  10 mg Oral QHS  amLODIPine (NORVASC) tablet 5 mg  5 mg Oral DAILY  insulin glargine (LANTUS) injection 10 Units  10 Units SubCUTAneous QHS  insulin lispro (HUMALOG) injection   SubCUTAneous AC&HS  
  glucose chewable tablet 16 g  4 Tab Oral PRN  
 glucagon (GLUCAGEN) injection 1 mg  1 mg IntraMUSCular PRN  
 dextrose (D50W) injection syrg 12.5-25 g  25-50 mL IntraVENous PRN  
 albuterol (PROVENTIL HFA, VENTOLIN HFA, PROAIR HFA) inhaler 2 Puff  2 Puff Inhalation Q4H PRN Current Outpatient Medications Medication Sig [place] misc 1 Each by Does Not Apply route daily. Please provide walker with wheels and stoppers  Carboxymethylcellulose-Glycern (REFRESH OPTIVE) 0.5-0.9 % drop Apply  to eye.  melatonin 5 mg cap capsule Take 1 Cap by mouth nightly.  mv,Ca,min-folic acid-vit K1 (ONE-A-DAY WOMEN'S 50 PLUS) 400-20 mcg tab Take 1 Tab by mouth daily.  glimepiride (AMARYL) 2 mg tablet Take 2 mg by mouth every morning.  bisacodyl (DULCOLAX, BISACODYL,) 10 mg suppository Insert 10 mg into rectum daily as needed (constipation).  amLODIPine (NORVASC) 5 mg tablet TAKE 1 TABLET BY MOUTH ONCE DAILY FOR HTN  
 aspirin delayed-release 81 mg tablet TAKE 1 TABLET BY MOUTH ONCE DAILY FOR HEART HEALTH  
 lovastatin (MEVACOR) 20 mg tablet TAKE 1 TABLET BY MOUTH DAILY IN THE EVENING FOR HYPERLIPEDEMIA  EASY TOUCH INSULIN SAFETY SYR 0.5 mL 30 gauge x 5/16\" syrg Use subcutaneously as directed with insulin.  SOD PHOS,M-B/NA PHOS,DI-BA (FLEET ENEMA RE) Insert 1 Dose into rectum daily as needed (Constipation (if no BM, notify MD)).  polyethylene glycol (MIRALAX) 17 gram packet Take 17 g by mouth two (2) times daily as needed (Constipation).  TRUEPLUS LANCETS 30 gauge misc Use to check blood glucose up to twice daily DX:e11.9 Allergies: Nitrofurantoin Family History Problem Relation Age of Onset  Diabetes Mother  Alcohol abuse Father [facility] Arthritis-rheumatoid Sister  Diabetes Brother  Other Brother   
     eye problems  Hypertension Son  Diabetes Son   
[facility] Cancer Son Pancreatic cancer  Alcohol abuse Son   
 
Social History Socioeconomic History  Marital status:  Spouse name: Not on file  Number of children: Not on file  Years of education: Not on file  Highest education level: Not on file Occupational History  Not on file Social Needs  Financial resource strain: Not on file  Food insecurity Worry: Not on file Inability: Not on file  Transportation needs Medical: Not on file Non-medical: Not on file Tobacco Use  Smoking status: Never Smoker  Smokeless tobacco: Never Used Substance and Sexual Activity  Alcohol use: No  
 Drug use: No  
 Sexual activity: Never Lifestyle  Physical activity Days per week: Not on file Minutes per session: Not on file  Stress: Not on file Relationships  Social connections Talks on phone: Not on file Gets together: Not on file Attends Worship service: Not on file Active member of club or organization: Not on file Attends meetings of clubs or organizations: Not on file Relationship status: Not on file  Intimate partner violence Fear of current or ex partner: Not on file Emotionally abused: Not on file Physically abused: Not on file Forced sexual activity: Not on file Other Topics Concern  Not on file Social History Narrative  Not on file Social History Tobacco Use Smoking Status Never Smoker Smokeless Tobacco Never Used Temp (24hrs), Av.9 °F (37.2 °C), Min:98.9 °F (37.2 °C), Max:98.9 °F (37.2 °C) Visit Vitals BP (!) 110/91 Pulse (!) 123 Temp 98.9 °F (37.2 °C) Resp 26 Ht 5' 1\" (1.549 m) Wt 44.5 kg (98 lb 1.7 oz) SpO2 (!) 84% BMI 18.54 kg/m² ROS: 12 point ROS obtained in details. Pertinent positives as mentioned in HPI,  
otherwise negative Physical Exam: 
 
General:          Lying in bed,  Anxious, on high flow oxygen HEENT:           Pupils equal.  Sclera anicteric. Conjunctiva pink. Mucous membranes dry Moist, no ear or nasal discharge Neck:               Supple. Trachea midline. No accessory muscle use. No thyromegaly. No jugular venous distention, no carotid bruit CV:                  Regular rate and rhythm on monitor. Lungs:              Bilateral chest movements equal.  Auscultation deferred since Covid positive Abdomen:        Soft, non-tender. Not distended. Bowel sounds normal.  
Extremities:     No cyanosis. No edema. Pulses 1+ b/l Neurologic:      Awake, easily disoriented. Follows simple commands, responds appropriately. Moves all 4 extremities, hard of hearing Skin:                Warm and dry. No rashes. Labs: Results:  
Chemistry Recent Labs 02/01/21 
0606 01/31/21 
1140 01/30/21 
1253 GLU 91 254* 550* * 142 134* K 3.1* 4.5 4.0  
* 110 98* CO2 28 26 26 BUN 41* 46* 75* CREA 0.87 0.93 1.44* CA 8.4* 8.6 9.8 AGAP 6 6 10 BUCR 47* 49* 52* AP 52 48 63  
TP 6.5 6.7 8.0 ALB 2.3* 2.5* 3.2*  
GLOB 4.2* 4.2* 4.8* AGRAT 0.5* 0.6* 0.7* CBC w/Diff Recent Labs 02/01/21 
0606 01/31/21 
0630 01/30/21 
1253 WBC 12.4 6.9 6.7  
RBC 4.56 4.48 4.91  
HGB 14.5 14.6 15.1 HCT 41.0 40.8 42.8  210 207 GRANS 88* 83* 84* LYMPH 4* 5* 5* EOS 0 0 0 Microbiology Recent Labs  
  01/30/21 
1328 01/30/21 
1254 CULT NO GROWTH 2 DAYS NO GROWTH 2 DAYS  
  
 
 
RADIOLOGY: 
 
All available imaging studies/reports in [facility] for this admission were reviewed Dr. Leticia Mansfield, Infectious Disease Specialist 
[phone] February 1, 2021 
11:09 AM

## 2021-02-01 NOTE — PROGRESS NOTES
conducted an initial consultation and Spiritual Assessment for [place] Paras, who is a 80 y.o.,female. Patient's Primary Language is: [place]. According to the patient's EMR Episcopalian Affiliation is: [facility].  
 
The reason the Patient came to the hospital is:  
Patient Active Problem List  
 Diagnosis Date Noted  Hypoxia 01/30/2021  Pneumonia due to COVID-19 virus 01/30/2021  Controlled type 2 diabetes mellitus without complication, with long-term current use of insulin ([address] 04/30/2018  Overactive bladder 02/23/2018  Type 2 diabetes mellitus with nephropathy ([address] 01/15/2018  Neck pain 07/05/2017  
 STEMI (ST elevation myocardial infarction) ([address] 05/03/2017  Hx of subdural hematoma 03/22/2017  Nocturia 02/26/2017  Spondylosis of cervical region without myelopathy or radiculopathy 11/07/2016  Advance directive discussed with patient 07/21/2016  Hypercholesterolemia  Hypertension  MVP (mitral valve prolapse)  Broken shoulder  Osteoarthritis  Incontinence  OAB (overactive bladder)  Urinary tract infection 04/13/2016  Hyperthyroidism 01/19/2016  Diabetes mellitus type 2, controlled ([address] 10/16/2015  Abnormality of gait  Acute upper respiratory infections of unspecified site  Senile osteoporosis  Urinary tract infection, site not specified  Chest pain  Thyrotoxicosis  Pure hypercholesterolemia  History of tachycardia  Mitral valve prolapse  Chronic airway obstruction, not elsewhere classified  Hypoglycemia 07/02/2010  Thyromegaly 07/02/2010  
 NIDDM (non-insulin dependent diabetes mellitus) 07/02/2010  
 HTN (hypertension) 07/02/2010  Hyperlipidemia 07/02/2010  Fatigue 07/02/2010  Thyroid nodule 07/02/2010 The  provided the following Interventions: 
Initiated a relationship of care and support with Rose Knight and her friend Aguila Diamond. Explored issues of amaya, belief, spirituality and Rastafarian/ritual needs while hospitalized. Listened empathically as she shared how she was feeling. Gave patient message of love from friend, Lethia Riedel. Also passed message for the patient via voicemail  to emergency contact, Christiano Mauricio. Offered prayer and assurance of continued prayers on patient's behalf. Chart reviewed. The following outcomes where achieved: 
Patient processed feeling about current hospitalization. Patient expressed gratitude for 's visit. Assessment: 
Patient does not have any Rastafarian/cultural needs that will affect patient's preferences in health care. There are no spiritual or Rastafarian issues which require intervention at this time. Plan: 
Chaplains will continue to follow and will provide pastoral care on an as needed/requested basis.  recommends bedside caregivers page  on duty if patient shows signs of acute spiritual or emotional distress.  Derrick Ritter Spiritual Care  
[phone]

## 2021-02-01 NOTE — ACP (ADVANCE CARE PLANNING)
Advanced Steps Advance Care Planning Conversation [facility] (Physician Orders for Scope of Treatment) Date of conversation: 21/2021  Location: [facility] Length (minutes): 50 
 
Participants: 
 [x] Patient [x] Healthcare agent (already designated in existing ACP document) Name: Juvenal Carl Relationship to Patient:scotty Phone [phone] Advanced Steps® ACP Facilitator: Muna Jameson RN, Lalit Holliday NP Conversation Topics Ms. Hannah Linda is a 41-year-old female with H/O of diabetes and HTN. Pt is a resident of [address] [place] [address]. Admiitted Via ED with complaints of generalized weakness and + COVID. Patient states that she has a poor appetite and has not been feeling well for around a week. Patient response: pt was very short of breath. Not wanting to eat \"D'ont force me\". Healthcare Agent/Other Surrogate:  Called and spoke with Juvenal Carl. Who shared that pt's son Napoleon Sood had passed away. Pt has 2 grandsons that Ms. Rylee Bernal stated are \"out of the picture\" and she has not way to contact them. \"I' m the only one she has\". Provided medical updated to Ms Rylee Bernal and that she was on max amounts of O2 and Her O2 levels were still. Low. Pt has a DDNR on file signed by her son, Yuridia Zuniga. Confirmed DNR/DNI with Ms. Rylee Bernal. Presented the benefits and burdens of intubation for respiratory distress verses moving to comfort measures. Ms. Rylee Bernal agreed with DNR/DNI and comfort measures and no feeding tubes. She shared that she was able to see Ms. Lissett Theodore at [facility] around [place] and shared she was starting to not eat well. Needs to discuss with spiritual/Restorationism advisor: [] Yes  [x] No 
 
Needs more information about illness and complications:  [] Yes  [x] No 
 
 
Cardiopulmonary Resuscitation Order Elected for CPR:  []  Attempt Resuscitation [x]  Do Not Attempt Resuscitation When NOT in Cardiopulmonary Arrest, Order Elected: [x] Comfort Measures 
[] Limited Additional Interventions [] Full Interventions Artificially Administered Nutrition, Order Elected: 
 
[x] No Feeding Tube  
[] Feeding Tube for a defined trial period 
[] Feeding Tube long-term if indicated The following was provided (check all that apply): Healthcare Decision Information: 
 [x] Help with Breathing Facts [x] Tube Feeding Facts [] CPR Facts [x] Review of existing Advance Directive 
[] Assistance with Completion of New Advance Directive [x] Review of [place] POST Form Meeting Outcomes: 
 [x] ACP discussion completed 
 [] [place] form completed 
[x] [place] prepared for Provider review and signature 
 [] Original placed on Chart, if in facility (form to be sent with patient at discharge) [] Copy given to healthcare agent  
 [] Copy scanned to electronic medical record If ACP discussion not completed, last interview topic discussed: Referral to Hospice made. Called [facility] left message requesting a return call to discuss POST and obtain email address. Follow-up plan:   
 [x] Schedule follow-up conversation to continue planning 
 [] Referred individual to Provider for additional questions/concerns  
[] Advised patient/agent/surrogate to review completed POST form and update if needed with changes in condition, patient preferences or care setting  
 
[] This note routed to one or more involved healthcare providers Zac Lomeli RN, BSN, [facility] Palliative Medicine Inpatient RN [facility] Palliative [place] Line: [phone])

## 2021-02-01 NOTE — HOSPICE
Per chart review, pt resides at [facility]. They use their contracted agency for hospice care. Will continue to follow from a distance until discharge. Hospice referral received. Chart review in process. Thank you for the referral to [address] [place] [address]. If we can be of further assistance please contact [phone] Salena Kay RN [place] [address] [place], [facility] [address] 
[phone] Email: [email or web address]

## 2021-02-01 NOTE — PROGRESS NOTES
OT orders received and chart was reviewed. 1042, Attempted to see patient however her heart rate is increased and with O2 desats. Patient not appropriate for skilled OT at this time. 1120, Spoke with palliative team who reports patient is going comfort at this time. Will complete OT orders. Thank you for this referral, Emilie Thornton MS, OTR/L

## 2021-02-02 NOTE — PROGRESS NOTES
Physician Progress Note Brittany Benavidez 
CSN #:                  [id number] :                       [id number] ADMIT DATE:       2021 12:36 PM 
[address] [place] DATE: 
RESPONDING 
PROVIDER #:        Luis Alfredo Guadalupe MD 
 
 
 
 
QUERY TEXT: 
 
Pt admitted with   COVID   NA  with sepsis   Noted documentation of  acute hypoxic respiratory failure (present on admission) due to severe COVID-19 pneumonia  by  ID    consultant. If possible, please document in progress notes and discharge summary: The medical record reflects the following: 
 
Risk Factors: advanced age; Covid  [facility] Clinical Indicators: RR  34 on admit  with sat  on 2L   96% 300  pt  de satted to 83%  on 2L    with sats continued in 80's on HiFlow  vapotherm 
per  ID-  Patient has significant worsening hypoxia and oxygen saturation were dropping in the 80s while on high flow oxygen at 40 L. ? Treatment:   Hi  flow  at 40 L   vapotherm Thank you,   Yvonne Inman RN  CCDS   x [phone] Options provided: 
-- acute hypoxic respiratory failure  confirmed present on admission 
-- acute hypoxic respiratory failure  confirmed not present on admission 
-- acute hypoxic respiratory failure  ruled out 
-- Defer to ID  consultant documentation regarding   acute hypoxic respiratory failure 
-- Other - I will add my own diagnosis -- Disagree - Not applicable / Not valid -- Disagree - Clinically unable to determine / Unknown 
-- Refer to Clinical Documentation Reviewer PROVIDER RESPONSE TEXT: 
 
The diagnosis of acute hypoxic respiratory failure was confirmed as present on admission.  
 
Query created by: Cristal Malcolm on 2021 11:04 AM 
 
 
Electronically signed by:  Luis Alfredo Guadalupe MD 2021 2:44 PM

## 2021-02-02 NOTE — PROGRESS NOTES
Problem: Diabetes Self-Management Goal: *Disease process and treatment process Description: Define diabetes and identify own type of diabetes; list 3 options for treating diabetes. Outcome: Progressing Towards Goal 
Goal: *Incorporating nutritional management into lifestyle Description: Describe effect of type, amount and timing of food on blood glucose; list 3 methods for planning meals. Outcome: Progressing Towards Goal 
Goal: *Incorporating physical activity into lifestyle Description: State effect of exercise on blood glucose levels. Outcome: Progressing Towards Goal 
Goal: *Developing strategies to promote health/change behavior Description: Define the ABC's of diabetes; identify appropriate screenings, schedule and personal plan for screenings. Outcome: Progressing Towards Goal 
Goal: *Using medications safely Description: State effect of diabetes medications on diabetes; name diabetes medication taking, action and side effects. Outcome: Progressing Towards Goal 
Goal: *Monitoring blood glucose, interpreting and using results Description: Identify recommended blood glucose targets  and personal targets. Outcome: Progressing Towards Goal 
Goal: *Prevention, detection, treatment of acute complications Description: List symptoms of hyper- and hypoglycemia; describe how to treat low blood sugar and actions for lowering  high blood glucose level. Outcome: Progressing Towards Goal 
Goal: *Prevention, detection and treatment of chronic complications Description: Define the natural course of diabetes and describe the relationship of blood glucose levels to long term complications of diabetes. Outcome: Progressing Towards Goal 
Goal: *Developing strategies to address psychosocial issues Description: Describe feelings about living with diabetes; identify support needed and support network Outcome: Progressing Towards Goal 
Goal: *Insulin pump training Outcome: Progressing Towards Goal 
 Goal: *Sick day guidelines Outcome: Progressing Towards Goal 
Goal: *Patient Specific Goal (EDIT GOAL, INSERT TEXT) Outcome: Progressing Towards Goal 
  
Problem: Patient Education: Go to Patient Education Activity Goal: Patient/Family Education Outcome: Progressing Towards Goal 
  
Problem: Patient Education: Go to Patient Education Activity Goal: Patient/Family Education Outcome: Progressing Towards Goal 
  
Problem: Falls - Risk of 
Goal: *Absence of Falls Description: Document Guzman Morales Fall Risk and appropriate interventions in the flowsheet. Outcome: Progressing Towards Goal 
Note: Fall Risk Interventions: 
  
 
Mentation Interventions: Adequate sleep, hydration, pain control, Bed/chair exit alarm, Reorient patient, More frequent rounding, Toileting rounds, Update white board Medication Interventions: Bed/chair exit alarm, Evaluate medications/consider consulting pharmacy Elimination Interventions: Bed/chair exit alarm, Call light in reach, Toileting schedule/hourly rounds Problem: Patient Education: Go to Patient Education Activity Goal: Patient/Family Education Outcome: Progressing Towards Goal 
  
Problem: Pressure Injury - Risk of 
Goal: *Prevention of pressure injury Description: Document Rashel Scale and appropriate interventions in the flowsheet. Outcome: Progressing Towards Goal 
Note: Pressure Injury Interventions: 
Sensory Interventions: Assess changes in LOC, Avoid rigorous massage over bony prominences, Keep linens dry and wrinkle-free, Maintain/enhance activity level, Minimize linen layers, Pressure redistribution bed/mattress (bed type), Turn and reposition approx. every two hours (pillows and wedges if needed) Moisture Interventions: Absorbent underpads, Check for incontinence Q2 hours and as needed, Internal/External urinary devices, Limit adult briefs, Maintain skin hydration (lotion/cream), Minimize layers, Moisture barrier Activity Interventions: Pressure redistribution bed/mattress(bed type) Mobility Interventions: Pressure redistribution bed/mattress (bed type) Nutrition Interventions: Offer support with meals,snacks and hydration Friction and Shear Interventions: HOB 30 degrees or less, Minimize layers Problem: Patient Education: Go to Patient Education Activity Goal: Patient/Family Education Outcome: Progressing Towards Goal 
  
Problem: Airway Clearance - Ineffective Goal: Achieve or maintain patent airway Outcome: Progressing Towards Goal 
  
Problem: Gas Exchange - Impaired Goal: Absence of hypoxia Outcome: Progressing Towards Goal 
Goal: Promote optimal lung function Outcome: Progressing Towards Goal 
  
Problem: Breathing Pattern - Ineffective Goal: Ability to achieve and maintain a regular respiratory rate Outcome: Progressing Towards Goal 
  
Problem: Body Temperature -  Risk of, Imbalanced Goal: Ability to maintain a body temperature within defined limits Outcome: Progressing Towards Goal 
Goal: Will regain or maintain usual level of consciousness Outcome: Progressing Towards Goal 
Goal: Complications related to the disease process, condition or treatment will be avoided or minimized Outcome: Progressing Towards Goal 
  
Problem: Isolation Precautions - Risk of Spread of Infection Goal: Prevent transmission of infectious organism to others Outcome: Progressing Towards Goal 
  
Problem: Nutrition Deficits Goal: Optimize nutrtional status Outcome: Progressing Towards Goal 
  
Problem: Risk for Fluid Volume Deficit Goal: Maintain normal heart rhythm Outcome: Progressing Towards Goal 
Goal: Maintain absence of muscle cramping Outcome: Progressing Towards Goal 
Goal: Maintain normal serum potassium, sodium, calcium, phosphorus, and pH Outcome: Progressing Towards Goal 
  
Problem: Loneliness or Risk for Loneliness Goal: Demonstrate positive use of time alone when socialization is not possible Outcome: Progressing Towards Goal 
  
Problem: Fatigue Goal: Verbalize increase energy and improved vitality Outcome: Progressing Towards Goal 
  
Problem: Patient Education: Go to Patient Education Activity Goal: Patient/Family Education Outcome: Progressing Towards Goal

## 2021-02-02 NOTE — PROGRESS NOTES
Discharge order noted for today. Pt has been accepted to [email or web address] [facility] at [facility]. Met with patient and Niece over the phone and are agreeable to the transition plan today. Transport has been arranged through [facility] at [facility]. Patient's discharge summary and home health  orders have been forwarded to [address] via [address] Updated bedside RN, Nathan Fowler,  to the transition plan. Discharge information has been documented on the AVS. Comfort meds being filled at outpatient pharmacy to send with patient JOHANNA Fowler informed to  for transport Goyo Souza RN BSN Care Manager [phone]

## 2021-02-02 NOTE — ACP (ADVANCE CARE PLANNING)
Palliative Medicine Advance Care Planning Meeting Outcomes: 
            [x]? ACP discussion completed 
            [x]? [place] POST form completed 
[x]? [place] prepared for Provider review and signature [x]? Original placed on Chart, if in facility (form to be sent with patient at discharge) [x]? Copy given to healthcare agent [x]? Copy scanned to electronic medical record Referral to Hospice made. Hiren Jain returned call. POST form explained. Email address obtained Form complete for comfort measures, DNR/DNI, NO Feeding tubes. Form sent and returned from Hiren Jain via [employer] (HIPAA compliant) to email - [email or web address] Follow-up plan:  Referral top Hospice  
            [x]? Schedule follow-up conversation to continue planning 
            []? Referred individual to Provider for additional questions/concerns Narinder Villanueva, RN, BSN, [facility] Palliative Medicine Inpatient RN DR. PAEZS [facility] Palliative COPE Line: [phone]

## 2021-02-02 NOTE — DISCHARGE SUMMARY
Discharge Summary Patient: Ja Healy MRN: [id number]  CSN: [id number] YOB: 1926  Age: 80 y.o. Sex: female DOA: 1/30/2021 LOS:  LOS: 3 days   Discharge Date: 2/2/2021 Admission Diagnoses: Hypoxia [R09.02] Pneumonia due to COVID-19 virus [U07.1, J12.82] Discharge Diagnoses:   
1. COVID-19 pneumonia 2. Hypoxia secondary to COVID PNA 3. afib with RVR, new onset in setting of COVID PNA 4. DMT2 with hyperglycemia 5. Advanced Age 6. Hard of hearing 7. HTN, HLD 8. History of mild aortic stenosis 9. History of COPD Discharge Condition: Stable PHYSICAL EXAM 
Visit Vitals BP (!) 92/55 Pulse (!) 53 Temp 97.7 °F (36.5 °C) Resp 18 Ht 5' 1\" (1.549 m) Wt 44.5 kg (98 lb 1.7 oz) SpO2 98% Breastfeeding No  
BMI 18.54 kg/m² General: Alert, cooperative, no acute distress HEENT: NC, Atraumatic. PERRLA, EOMI. Anicteric sclerae. Lungs:  CTA Bilaterally. No Wheezing/Rhonchi/Rales. Heart:  Irregularly irregular, cesia,  No murmur, No Rubs, No Gallops Abdomen: Soft, Non distended, Non tender. +Bowel sounds, no HSM Extremities: No c/c/e Psych:   Good insight. Not anxious or agitated. Neurologic:   a/o to self, generalized weakness Hospital Course: Per prior HPI, \"This is a 77-year-old female with a past medical history of diabetes and hypertension who was sent to the ED from [address] with complaints of generalized weakness. Patient is a poor historian and does not provide much history. Patient states that she has a poor appetite and has not been feeling well for around a week. Patient states that she got her Covid vaccine earlier this week. No history of any chest pain or shortness of breath or cough. No history of any fever or chills. No history of any nausea vomiting abdominal pain or urinary complaints. No history of any leg swelling or rash. Patient is hard of hearing. In the ED patient was noted to be hypoxic and chest x-ray was concerning for COVID-19 pneumonia. Patient was also noted to have significant hyperglycemia and was started on an insulin drip. \" 
 
Rapid Covid positive and Novel coronavirus positive on 1/30. She was on HFNC 20L, started on IV abx, Decadron and Remdesivir. On 1/31 cardiology consulted for Afib w/ RVR, likely trigger stress response. She was started on Heparin gtt. Palliative care consulted. Pt with worsening hypoxia with oxygen sats in the 80s while on HFNC 40L. Palliative care was able to speak with pt's scotty [place]- goals of care DNR/DNI comfort measures, no feeding tubes and hospice consulted. At this time, plans are for her to return to [address] [place] [address] with hospice. CM following and assisting with d/c plan.   
 
Attempted to call scotty Romero at [phone] @11:38 am to update on plan to d/c with hospice- phone went directly to - message left to return call. Prescriptions for hospice have been printed and on pt's chart for discharge. Consults: Infectious Disease, Cardiology, Palliative Care Significant Diagnostic Studies:   
Results for Lilli Mercer (MRN [id number]) as of 2/2/2021 13:37 Ref. Range 2/1/2021 06:06 WBC Latest Ref Range: 4.6 - 13.2 K/uL 12.4 RBC Latest Ref Range: 4.20 - 5.30 M/uL 4.56 HGB Latest Ref Range: 12.0 - 16.0 g/dL 14.5 HCT Latest Ref Range: 35.0 - 45.0 % 41.0 MCV Latest Ref Range: 74.0 - 97.0 FL 89.9 MCH Latest Ref Range: 24.0 - 34.0 PG 31.8 MCHC Latest Ref Range: 31.0 - 37.0 g/dL 35.4 RDW Latest Ref Range: 11.6 - 14.5 % 13.5 PLATELET Latest Ref Range: 135 - 420 K/uL 251 MPV Latest Ref Range: 9.2 - 11.8 FL 10.5 NEUTROPHILS Latest Ref Range: 40 - 73 % 88 (H) LYMPHOCYTES Latest Ref Range: 21 - 52 % 4 (L) MONOCYTES Latest Ref Range: 3 - 10 % 8 EOSINOPHILS Latest Ref Range: 0 - 5 % 0  
BASOPHILS Latest Ref Range: 0 - 2 % 0  
DF Latest Units:   AUTOMATED  
ABS. NEUTROPHILS Latest Ref Range: 1.8 - 8.0 K/UL 10.9 (H)  
ABS. LYMPHOCYTES Latest Ref Range: 0.9 - 3.6 K/UL 0.5 (L)  
ABS. MONOCYTES Latest Ref Range: 0.05 - 1.2 K/UL 1.0  
ABS. EOSINOPHILS Latest Ref Range: 0.0 - 0.4 K/UL 0.0  
ABS. BASOPHILS Latest Ref Range: 0.0 - 0.1 K/UL 0.0  
aPTT Latest Ref Range: 23.0 - 36.4 .1 (HH)  
D DIMER Latest Ref Range: <0.46 ug/ml(FEU) 1.82 (H) Sodium Latest Ref Range: 136 - 145 mmol/L 146 (H) Potassium Latest Ref Range: 3.5 - 5.5 mmol/L 3.1 (L) Chloride Latest Ref Range: 100 - 111 mmol/L 112 (H) CO2 Latest Ref Range: 21 - 32 mmol/L 28 Anion gap Latest Ref Range: 3.0 - 18 mmol/L 6 Glucose Latest Ref Range: 74 - 99 mg/dL 91 BUN Latest Ref Range: 7.0 - 18 MG/DL 41 (H) Creatinine Latest Ref Range: 0.6 - 1.3 MG/DL 0.87 BUN/Creatinine ratio Latest Ref Range: 12 - 20   47 (H) Calcium Latest Ref Range: 8.5 - 10.1 MG/DL 8.4 (L) Magnesium Latest Ref Range: 1.6 - 2.6 mg/dL 1.8  
GFR est non-AA Latest Ref Range: >60 ml/min/1.73m2 >60  
GFR est AA Latest Ref Range: >60 ml/min/1.73m2 >60 Bilirubin, total Latest Ref Range: 0.2 - 1.0 MG/DL 0.4 Protein, total Latest Ref Range: 6.4 - 8.2 g/dL 6.5 Albumin Latest Ref Range: 3.4 - 5.0 g/dL 2.3 (L) Globulin Latest Ref Range: 2.0 - 4.0 g/dL 4.2 (H) A-G Ratio Latest Ref Range: 0.8 - 1.7   0.5 (L) ALT Latest Ref Range: 13 - 56 U/L 25 AST Latest Ref Range: 10 - 38 U/L 35 Alk. phosphatase Latest Ref Range: 45 - 117 U/L 52 Procalcitonin Latest Units: ng/mL 0.15  
C-Reactive protein Latest Ref Range: 0 - 0.3 mg/dL 5.3 (H) C REACTIVE PROTEIN, QT Unknown Rpt (A) XR Results (most recent): 
Results from [facility] Encounter encounter on 01/30/21 XR CHEST PORT Narrative EXAM: XR CHEST PORT 
 
CLINICAL INDICATION/HISTORY : Sepsis. COMPARISON: May 2, 2017 TECHNIQUE: 1 VIEWS 
 
FINDINGS:  
EKG leads and wires overlie the chest 
There are mild patchy bilateral pulmonary opacities. . 
*Chronic vascular calcifications. No cardiomegaly. Heddy  No evidence of pleural effusion. Impression 1. Mild patchy bilateral pulmonary opacities may be due to infiltrates or 
atelectasis or fibrosis. Discharge Medications:   
Current Discharge Medication List  
  
START taking these medications Details LORazepam (INTENSOL) 2 mg/mL concentrated solution Take 0.25 mL by mouth every four (4) hours as needed for Agitation, Anxiety, Restlessness or Shortness of Breath. Max Daily Amount: 3 mg. Indications: comfort care Qty: 30 mL, Refills: 0 Associated Diagnoses: Hospice care  
  
morphine (ROXANOL) 20 mg/mL concentrated oral syringe Take 0.25 mL by mouth every one (1) hour as needed for Pain or Shortness of Breath for up to 5 days. Max Daily Amount: 120 mg. 
Qty: 30 mL, Refills: 0 Associated Diagnoses: Hospice care CONTINUE these medications which have CHANGED Details Carboxymethylcellulose-Glycern (Refresh Optive) 0.5-0.9 % drop Administer 1 Drop to both eyes every two (2) hours as needed for Other (dry eye). Indications: dry eye 
Qty: 10 mL, Refills: 0 Associated Diagnoses: Dry eye STOP taking these medications Walker misc Comments:  
Reason for Stopping:   
   
 melatonin 5 mg cap capsule Comments: Reason for Stopping:   
   
 mv,Ca,min-folic acid-vit K1 (ONE-A-DAY WOMEN'S 50 PLUS) 400-20 mcg tab Comments:  
Reason for Stopping:   
   
 glimepiride (AMARYL) 2 mg tablet Comments:  
Reason for Stopping:   
   
 bisacodyl (DULCOLAX, BISACODYL,) 10 mg suppository Comments:  
Reason for Stopping:   
   
 amLODIPine (NORVASC) 5 mg tablet Comments:  
Reason for Stopping:   
   
 aspirin delayed-release 81 mg tablet Comments:  
Reason for Stopping:   
   
 lovastatin (MEVACOR) 20 mg tablet Comments:  
Reason for Stopping:   
   
 EASY TOUCH INSULIN SAFETY SYR 0.5 mL 30 gauge x 5/16\" syrg Comments:  
Reason for Stopping: SOD PHOS,M-B/NA PHOS,DI-BA (FLEET ENEMA RE) Comments:  
Reason for Stopping:   
   
 polyethylene glycol (MIRALAX) 17 gram packet Comments:  
Reason for Stopping:   
   
 TRUEPLUS LANCETS 30 gauge misc Comments:  
Reason for Stopping:   
   
  
 
Activity: activity as tolerated. Comfort care/hospice measures. Diet: Comfort feeding- mechanical soft Wound Care: preventative measures Follow-up: with hospice upon arrival  
 
Minutes spent on discharge: >30 minutes spent coordinating this discharge (review instructions/follow-up, prescriptions, preparing report for sign off) Mateo Brown NP-C 
[facility] 
pager [phone]

## 2021-02-02 NOTE — ROUTINE PROCESS
Checking patient on rounds to prepare for discharge and noted that the patient was not breathing. Md was called concerning this situation. 5 Md pronounced the patient. 1381 - Life net, ambulance, ortiz, and supervisor were called. Manager of [address] [place] [address] was made aware. [address] and [address] Em[address] were notified.

## 2021-02-02 NOTE — PROGRESS NOTES
[address] [facility] and spoke with Adarsh Freitas, about patient returning to [address] on Hospice. Inocencio Julian stated that [address] uses [facility]. Inocencio Julian is understands that patient is covid positive and on comfort and is agreeable to have patient return on Hospice. Will need D/C summary, Scripts with diagnosis on each one, and transport. Called Karina turner [phone] and left a message with Anita Minaya, answering service to call this writer back. Clinicals and Hospice orders sent to [facility] in [place]. David Ram, RN BSN Care Manager [phone]

## 2021-02-02 NOTE — PROGRESS NOTES
Physician Progress Note PAT[facility]TR[facility] 
CSN #:                  [id number] :                       1926 ADMIT DATE:       2021 12:36 PM 
[address] DATE: 
RESPONDING 
PROVIDER #:        Yolanda Nick MD 
 
 
 
 
QUERY TEXT: 
 
Dear Hospitalist 
Pt admitted with Covid 19 PNA. Pt noted to have SIRS 2/4 . If possible, please document in the progress notes and discharge summary if you are evaluating and /or treating any of the following: The medical record reflects the following: 
Risk Factors: 75-year-old female sent from [address] [place] [address] to ED Patient states that she has a poor appetite and has not been feeling well for around a week. Patient states that she got her Covid vaccine earlier this week Clinical Indicators: MD PN   Patient states that she got her Covid vaccine earlier this week  In the ED patient was noted to be hypoxic and chest x-ray was concerning for COVID-19 pneumonia,ID MD [place] Clinical presentation consistent with acute hypoxic respiratory failure (present on admission) due to severe COVID-19 pneumonia Patient was on high flow oxygen when I evaluated her. Her oxygen saturation was dropping to the 80s on 40 L high flow. ED VS Pul 131,RR 37 pulse ox 81 percent ,O2 HiFlo 40l/nc,lowest bp in ED at 103/66 recorded Labs CRP 5.1,creat. 1.44 Treatment: Decadron, remdesivir Ceftriaxone, azithromycin ordered IVF's and Labs monitored carefully. Thank you Dl Rosa RN   Options provided: 
-- COVID19 PNA with  Sepsis, present on admission 
-- COVID19 PNA with  Sepsis , present on admission now resolved 
-- COVID19 PNA with  Sepsis , not present on admission 
-- No Sepsis, COVID19 PNA only 
-- Sepsis was ruled out 
-- Other - I will add my own diagnosis -- Disagree - Not applicable / Not valid -- Disagree - Clinically unable to determine / Unknown 
-- Refer to Clinical Documentation Reviewer PROVIDER RESPONSE TEXT: 
 
 This patient has COVID19 PNA with Sepsis which was present on admission. Query created by:  Yadi Guadalupe on 2/1/2021 5:45 PM 
 
 
Electronically signed by:  Cheyenne Cabrera MD [facility] MD 2/2/2021 8:01 AM

## 2021-02-02 NOTE — PROGRESS NOTES
[facility]: [phone] ([phone]) [place] [place] [facility]: [phone] [facility]: [phone] Patient Name: Patricia Adams YOB: 1926 Date of follow up 2/2/2021 Reason for Consult: care decisions Requesting Provider: Dr Makayla Bateman Primary Care Physician: Chang Abel MD 
  
 SUMMARY:  
Patricia Adams is a 80y.o. year old with a past history of diabetes, hypertension, COPD, diabetes, osteoporosis, who was admitted on 1/30/2021 from [place] [facility]  with a diagnosis of COVID 19 pneumonia. Current medical issues leading to Palliative Medicine involvement include: 80year old female who lives in assistive living. She recently received the COVID 19 vaccine, developed weakness, nausea. She is positive for COVID 19 pneumonia. Palliative medicine is consulted for care decisions. 2/2/2021 Resting in bed, alerts to her name, but will not follow commands, \" says please don't force me\". Remains on comfort measures. PALLIATIVE DIAGNOSES:  
1. Goals of care 2. COVID 19 pneumonia 3. Hypoxia 4. Debility PLAN:  
1. 2/2/2021 Ms Marta White seen at bedside in full PPE. Eyes closed when I entered room. Appeared comfortable. Still on high flow. Spoke with BSRN, d/c high flow,use Roxanol for dyspnea. Place on nasal cannula oxygen. Offered food and drink but she refused. Remains confused. Noted plans for transfer back to [facility] with hospice support. Goals of care DNR/DNI comfort measures, no feeding tubes. POST for the above measures has been returned docusigned by niece [employer]. Copy placed on chart. ( please see below for previous notes per my initial note) 2. Goals of care Ms Cassia Glasgow seen in full PPE. She is alert, confused, was not able to follow any conversation, really just said over and over again, please don't force me, I feel so bad. ... We called the listed numbers, Spoke with Torsten Brewer who is her niece. Torsten Brewer shared Nyasia's son has passed Loise Phalen). There are 2 grandson's but are estranged from patient with no known contact. Torsten Brewer tells she is legal next of kin. She also has financial power of . Spoke with Torsten Brewer along with Ms Pamela Mueller, JOHANNA. Shared with Moxahalathania Fortune despite high flow and aggressive treatment is not responding as well as we had hoped. Goals of care and care decisions. Benefits and burdens of resuscitation discussed at length, as it relates to [facility] advanced age, increasing oxygen requirements. Torsten Brewer agreed CPR and intubation would not help her aunt live a \" better life\". Unfortunately patient despite efforts is not responding well to treatment. Discussed comfort measures going forward including stopping all IVF's IVAB, weaning oxygen to nasal cannula, stopping labs draws, x rays. Symptom management discussed including using Roxanol and Ativan for dyspnea and pain. Jessica agreeable to DNR/DNI, comfort measures, no feeding tubes. Goals of care DNR/DNI comfort measures, no feeding tubes. Attending and BSRN aware of comfort measures and goals of care change. Comfort order set in place. We discussed hospice at facility with Adelina Barfield who was agreeable to this support. Hospice consult placed. 3. COVID 19 pneumonia  ID has been consulted, was on IVAB. On high flow with desats noted. Now on comfort measures, will wean high flow use Roxanol to help with dyspnea. 4. Hypoxia evident on high flow, will d/c pulse ox monitoring. Will monitor patient for distress and dyspnea 5. Debility lives at assistive living. Confused here, not sure of her baseline. Her current PPS is low at 30 indicating an overall poor prognosis with ensuing debility 6. Initial consult note routed to primary continuity provider 7. Communicated plan of care with: Palliative IDT, attending, Sohan Pearson, Catia Dubon GOALS OF CARE: comfort measures Patient/Health Care Proxy Stated Goals: Comfort TREATMENT PREFERENCES:  
Code Status: DNR/ DNI Advance Care Planning: 
[] [facility] Interdisciplinary Team has updated the ACP Navigator with [address] and Patient Capacity Primary Decision [facility] (Health Care Agent): Catia Dubon Medical Interventions: Comfort measures Artificially Administered Nutrition: No feeding tube Other: As far as possible, the palliative care team has discussed with patient / health care proxy about goals of care / treatment preferences for patient. HISTORY:  
 
History obtained from: chart and niece CHIEF COMPLAINT: COVID 19 pneumonia HPI/SUBJECTIVE: The patient is:  
[] Verbal and participatory [x] Non-participatory due to: confusion Please see summary 2/2/2021 remains on comfort measures, plan d/c later today to [facility] with hospice Clinical Pain Assessment (nonverbal scale for nonverbal patients): Clinical Pain Assessment Severity: 0 Activity (Movement): Lying quietly, normal position Duration: for how long has pt been experiencing pain (e.g., 2 days, 1 month, years) Frequency: how often pain is an issue (e.g., several times per day, once every few days, constant) FUNCTIONAL ASSESSMENT:  
 
Palliative Performance Scale (PPS): PPS: 30 
 
ECOG 
ECOG Status : Completely disabled PSYCHOSOCIAL/SPIRITUAL SCREENING:  
  
Any spiritual / Taoist concerns: not able to assess for patient  
[] Yes /  [] No 
 
Caregiver Burnout: 
[] Yes /  [] No /  [x] No Caregiver Present Anticipatory grief assessment:  Unable to assess  
[] Normal  / [] Maladaptive REVIEW OF SYSTEMS:  
 
Positive and pertinent negative findings in ROS are noted above in HPI. The following systems were [] reviewed / [x] unable to be reviewed as noted in HPI Other findings are noted below. Systems: constitutional, ears/nose/mouth/throat, respiratory, gastrointestinal, genitourinary, musculoskeletal, integumentary, neurologic, psychiatric, endocrine. Positive findings noted below. Modified ESAS Completed by: provider Pain: 0 Nausea: 7 Dyspnea: 0 PHYSICAL EXAM:  
 
Wt Readings from Last 3 Encounters:  
01/31/21 44.5 kg (98 lb 1.7 oz) 01/08/20 44.5 kg (98 lb) 08/19/19 46.9 kg (103 lb 6.4 oz) Blood pressure (!) 92/55, pulse (!) 53, temperature 97.7 °F (36.5 °C), resp. rate 18, height 5' 1\" (1.549 m), weight 44.5 kg (98 lb 1.7 oz), SpO2 98 %, not currently breastfeeding. Pain: 
Pain Scale 1: Adult Nonverbal Pain Scale Pain Intensity 1: (patient states she is hurting) Last bowel movement: none recorded Constitutional: reclining in bed, eyes closed in NAD  
ENMT dry MMM Lawson Delacruz Respiratory: no resp distress noted Skin: warm, dry Neurologic: eyes closed but did alert to light touch, oriented to herself HISTORY:  
 
Active Problems: Hypoxia (1/30/2021) Pneumonia due to COVID-19 virus (1/30/2021) Past Medical History:  
Diagnosis Date  Abnormality of gait  Acute upper respiratory infections of unspecified site  Anemia  Broken shoulder   
 right  Chest pain  Chronic airway obstruction, not elsewhere classified  COPD  Diabetes ([facility] [address]  Essential hypertension, benign  Hearing loss  History of tachycardia   
 possible SVT  Hypercholesterolemia  Hyperlipidemia  Hypertension  Incontinence  Mitral valve prolapse  MVP (mitral valve prolapse)  OAB (overactive bladder)  Osteoarthritis   
 spine  Osteoporosis  Pure hypercholesterolemia  Senile osteoporosis  Thyroid disease  Thyrotoxicosis  Type II or unspecified type diabetes mellitus without mention of complication, not stated as uncontrolled  Urinary tract infection, site not specified Past Surgical History:  
Procedure Laterality Date  HX KNEE REPLACEMENT  left  HX OPEN REDUCTION INTERNAL FIXATION Left   
 patellar fracture  HX OTHER SURGICAL Right   
 shoulder surgery  MA XCAPSL CTRC RMVL INSJ IO LENS PROSTH CPLX WO ECP  [id number]  
 bilateral  
  
Family History Problem Relation Age of Onset  Diabetes Mother  Alcohol abuse Father Byrnes Arthritis-rheumatoid Sister  Diabetes Brother  Other Brother   
     eye problems  Hypertension Son  Diabetes Son   
Byrnes Cancer Son Pancreatic cancer  Alcohol abuse Son History reviewed, no pertinent family history. Social History Tobacco Use  Smoking status: Never Smoker  Smokeless tobacco: Never Used Substance Use Topics  Alcohol use: No  
 
Allergies Allergen Reactions  Nitrofurantoin Nausea and Vomiting Current Facility-Administered Medications Medication Dose Route Frequency  LORazepam (INTENSOL) 2 mg/mL oral concentrate 0.5 mg  0.5 mg Oral Q4H PRN  
 morphine (ROXANOL) concentrated oral syringe 5 mg  5 mg Oral Q1H PRN  
 melatonin (rapid dissolve) tablet 5 mg  5 mg Oral QHS  sodium chloride (NS) flush 5-10 mL  5-10 mL IntraVENous PRN  
 ascorbic acid (vitamin C) (VITAMIN C) tablet 250 mg  250 mg Oral BID  cholecalciferol (VITAMIN D3) (1000 Units /25 mcg) tablet 2,000 Units  2,000 Units Oral DAILY  zinc sulfate (ZINCATE) 220 (50) mg capsule 1 Cap  1 Cap Oral DAILY  sodium chloride (NS) flush 5-40 mL  5-40 mL IntraVENous Q8H  
 sodium chloride (NS) flush 5-40 mL  5-40 mL IntraVENous PRN  
 acetaminophen (TYLENOL) tablet 650 mg  650 mg Oral Q6H PRN Or  
 acetaminophen (TYLENOL) suppository 650 mg  650 mg Rectal Q6H PRN  polyethylene glycol (MIRALAX) packet 17 g  17 g Oral DAILY PRN  
  promethazine (PHENERGAN) tablet 12.5 mg  12.5 mg Oral Q6H PRN Or  
 ondansetron (ZOFRAN) injection 4 mg  4 mg IntraVENous Q6H PRN  
 amLODIPine (NORVASC) tablet 5 mg  5 mg Oral DAILY  albuterol (PROVENTIL HFA, VENTOLIN HFA, PROAIR HFA) inhaler 2 Puff  2 Puff Inhalation Q4H PRN  
 
 
 LAB AND IMAGING FINDINGS:  
 
Lab Results Component Value Date/Time WBC 12.4 02/01/2021 06:06 AM  
 HGB 14.5 02/01/2021 06:06 AM  
 PLATELET 938 90/11/8343 06:06 AM  
 
Lab Results Component Value Date/Time Sodium 146 (H) 02/01/2021 06:06 AM  
 Potassium 3.1 (L) 02/01/2021 06:06 AM  
 Chloride 112 (H) 02/01/2021 06:06 AM  
 CO2 28 02/01/2021 06:06 AM  
 BUN 41 (H) 02/01/2021 06:06 AM  
 Creatinine 0.87 02/01/2021 06:06 AM  
 Calcium 8.4 (L) 02/01/2021 06:06 AM  
 Magnesium 1.8 02/01/2021 06:06 AM  
 Phosphorus 2.6 09/15/2011 09:10 PM  
  
Lab Results Component Value Date/Time Alk. phosphatase 52 02/01/2021 06:06 AM  
 Protein, total 6.5 02/01/2021 06:06 AM  
 Albumin 2.3 (L) 02/01/2021 06:06 AM  
 Globulin 4.2 (H) 02/01/2021 06:06 AM  
 
Lab Results Component Value Date/Time INR 1.0 06/10/2017 10:30 AM  
 Prothrombin time 12.6 06/10/2017 10:30 AM  
 aPTT 148.1 (HH) 02/01/2021 06:06 AM  
  
Lab Results Component Value Date/Time Iron 31 (L) 05/05/2017 03:40 AM  
 TIBC 240 (L) 05/05/2017 03:40 AM  
 Iron % saturation 13 05/05/2017 03:40 AM  
  
No results found for: PH, PCO2, PO2 No components found for: [place] Lab Results Component Value Date/Time CK 51 01/30/2021 12:53 PM  
 CK - MB 2.1 01/30/2021 12:53 PM  
  
 
   
 
Total time: 25 minutes Counseling / coordination time, spent as noted above:  
> 50% counseling / coordination: yes with RN on end of life treatment Prolonged service was provided for  []30 min   []75 min in face to face time in the presence of the patient, spent as noted above. Time Start:  
Time End:

## 2021-02-02 NOTE — PROGRESS NOTES
Reason for Admission:  Hypoxia [R09.02] Pneumonia due to COVID-19 virus [U07.1, J12.82] RUR Score:    13 Plan for utilizing home health:    [id number] [address]askins Likelihood of Readmission:   LOW Transition of Care Plan:         
 
 
Initial assessment completed with nursing home and past medical records. Cognitive status of patient: disoriented. Face sheet information confirmed:  yes. The patient designates Анна Jarvis to participate in her discharge plan and to receive any needed information. This patient lives in a [address] [place] [address] with patient. Patient is not able to navigate steps as needed. Prior to hospitalization, patient was considered to be independent with ADLs/IADLS : no . If not independent,  patient needs assist with : dressing, bathing, food preparation, cooking, toileting, grooming and self feeding Patient has a current ACP document on file: yes The patient and other:  BLS will be available to transport patient home upon discharge. The patient already has [facility],  medical equipment available in the home. Patient is not currently active with home health. Patient has stayed in a skilled nursing facility or rehab. Was  stay within last 60 days : no. This patient is on dialysis :no 
 
List of available Hospice agencies were provided and reviewed with the patient prior to discharge. Freedom of choice signed: yes, for Karina turner. Currently, the discharge plan is Hospice. The patient states that she can obtain her medications from the pharmacy, and take her medications as directed. Patient's current insurance is Medicare and [employer] Care Management Interventions PCP Verified by CM: Yes Mode of Transport at Discharge: Self Transition of Care Consult (CM Consult): [facility]: No 
Reason Outside [place]: Managed care specific requirement Current Support Network: Assisted Living Confirm Follow Up Transport: Friends The Plan for Transition of Care is Related to the Following Treatment Goals : Home hospice The Patient and/or Patient Representative was Provided with a Choice of Provider and Agrees with the Discharge Plan?: Yes Name of the Patient Representative Who was Provided with a Choice of Provider and Agrees with the Discharge Plan: Srikanth Monte at [facility] [place] of Choice List was Provided with Basic Dialogue that Supports the Patient's Individualized Plan of Care/Goals, Treatment Preferences and Shares the Quality Data Associated with the Providers?: Yes Discharge Location Discharge Placement: Home with hospice Aye Floyd RN BSN Care Manager [phone]

## 2021-02-02 NOTE — PROGRESS NOTES
Nurse paged to state that patient passed away, Patient seen and examined at bedsideno pulse, no breathing. Time of death 5. I called the patient's niece Santiago Hamilton and informed her about patient's death. She was tearful. She mentioned she will call the  home.

## 2021-02-02 NOTE — DISCHARGE INSTRUCTIONS
Patient armband removed and shredded  MyChart Activation    Thank you for requesting access to [employer]. Please follow the instructions below to securely access and download your online medical record. [employer] allows you to send messages to your doctor, view your test results, renew your prescriptions, schedule appointments, and more. How Do I Sign Up? 1. In your internet browser, go to [email or web address]  2. Click on the First Time User? Click Here link in the Sign In box. You will be redirect to the New Member Sign Up page. 3. Enter your [employer] Access Code exactly as it appears below. You will not need to use this code after youve completed the sign-up process. If you do not sign up before the expiration date, you must request a new code. [employer] Access Code: [email or web address]  Expires: 3/18/2021 12:37 PM (This is the date your [employer] access code will )    4. Enter the last four digits of your Social Security Number (xxxx) and Date of Birth (mm/dd/yyyy) as indicated and click Submit. You will be taken to the next sign-up page. 5. Create a [employer] ID. This will be your [employer] login ID and cannot be changed, so think of one that is secure and easy to remember. 6. Create a [employer] password. You can change your password at any time. 7. Enter your Password Reset Question and Answer. This can be used at a later time if you forget your password. 8. Enter your e-mail address. You will receive e-mail notification when new information is available in [address] 9. Click Sign Up. You can now view and download portions of your medical record. 10. Click the Download Summary menu link to download a portable copy of your medical information. Additional Information    If you have questions, please visit the Frequently Asked Questions section of the [employer] website at [email or web address] Remember, [employer] is NOT to be used for urgent needs.  For medical emergencies, dial 911.      As part of the discharge instructions, medications already given today were discussed with the patient. The next dose due of all ordered meds was highlighted as part of the medication discharge instructions. Discussed with the patient the importance of taking medications as directed, as well as the side effects and adverse reactions to medications ordered. DISCHARGE SUMMARY from Nurse    PATIENT INSTRUCTIONS:    After general anesthesia or intravenous sedation, for 24 hours or while taking prescription Narcotics:  · Limit your activities  · Do not drive and operate hazardous machinery  · Do not make important personal or business decisions  · Do  not drink alcoholic beverages  · If you have not urinated within 8 hours after discharge, please contact your surgeon on call. Report the following to your surgeon:  · Excessive pain, swelling, redness or odor of or around the surgical area  · Temperature over 100.5  · Nausea and vomiting lasting longer than 4 hours or if unable to take medications  · Any signs of decreased circulation or nerve impairment to extremity: change in color, persistent  numbness, tingling, coldness or increase pain  · Any questions    What to do at Home:  Recommended activity: Activity as tolerated,     If you experience any of the following symptoms increased shortness of breath, please follow up with Hospice Nurse. *  Please give a list of your current medications to your Primary Care Provider. *  Please update this list whenever your medications are discontinued, doses are      changed, or new medications (including over-the-counter products) are added. *  Please carry medication information at all times in case of emergency situations.     These are general instructions for a healthy lifestyle:    No smoking/ No tobacco products/ Avoid exposure to second hand smoke  Surgeon General's Warning:  Quitting smoking now greatly reduces serious risk to your health. Obesity, smoking, and sedentary lifestyle greatly increases your risk for illness    A healthy diet, regular physical exercise & weight monitoring are important for maintaining a healthy lifestyle    You may be retaining fluid if you have a history of heart failure or if you experience any of the following symptoms:  Weight gain of 3 pounds or more overnight or 5 pounds in a week, increased swelling in our hands or feet or shortness of breath while lying flat in bed. Please call your doctor as soon as you notice any of these symptoms; do not wait until your next office visit. The discharge information has been reviewed with the patient. The patient verbalized understanding. Discharge medications reviewed with the patient and appropriate educational materials and side effects teaching were provided.   ___________________________________________________________________________________________________________________________________

## 2021-02-02 NOTE — PROGRESS NOTES
Spoke with Marisue Denver at [facility] [phone]. Marisue Denver has received the referral and all the clinicals. STAT DME ordered to be delivered to [address] [place] [address]. Marisue Denver is requesting that comfort meds be filled at outpatient pharmacy and sent with patient. Talya Bhardwaj RN BSN Care Manager [phone]

## 2021-02-02 NOTE — PROGRESS NOTES
Comprehensive Nutrition Assessment Type and Reason for Visit: Initial(BMI) Nutrition Recommendations/Plan:  
- Provide nutrition intervention consistent with plan of care goals for patient; comfort measures, diet for comfort. Nutrition Assessment:  Pt is poor historian; admitted from [facility] with c/o generalized weakness. She has had poor appetite x week PTA. Pt received first round of covid 19 vaccine on 1/22; although, noted is covid 19 positive. Poor po intake per nursing note during previous days. Palliative following for plan of care goals; pt transitioned to comfort care on 2/1 per family wishes; no feeding tubes. Noted plan for possible discharge today; order placed. Malnutrition Assessment: 
Malnutrition Status: At risk for malnutrition (specify)(decresed appetite PTA) Nutrition History and Allergies: Past medical hx:  COPD, DM, HTN, HLD, hypercholesterolemia, osteoporosis, thyroid disease, UTI. Poor appetite x week PTA. Wt fluctuations PTA per chart hx; net wt loss of 5 lb, 4.9% x 5-6 month PTA per chart hx. No known food allergies. Estimated Daily Nutrient Needs: 
Energy (kcal): 755-1462; Weight Used for Energy Requirements: Admission(44.5 kg) Protein (g): 36-53; Weight Used for Protein Requirements: Admission(x0.8-1.2) Fluid (ml/day): 939-1174; Method Used for Fluid Requirements: 1 ml/kcal 
 
 
Nutrition Related Findings:  BM 2/1. No edema. Meds: vitamin C, vitamin D3, zinc sulfate          Pt seen by SLP; pt on dysphagia diet Wounds:   
None Current Nutrition Therapies: DIET CARDIAC Mechanical Soft; Consistent Carb 1800kcal 
 
Anthropometric Measures: 
· Height:  5' 1\" (154.9 cm) · Current Body Wt:  44.5 kg (98 lb 1.7 oz) · Admission Body Wt:  98 lb 1.7 oz · Ideal Body Wt:  105 lbs:  93.4 % · Adjusted Body Weight:   ; Weight Adjustment for: No adjustment · BMI Category:  Underweight (BMI less than 22) age over 72    
 
 Nutrition Diagnosis: No nutrition diagnosis at this time Nutrition Interventions:  
Food and/or Nutrient Delivery: Continue current diet, Mineral supplement, Vitamin supplement Nutrition Education and Counseling: Education not indicated(hard of hearing) Coordination of Nutrition Care: Continue to monitor while inpatient Goals: 
Provide nutrition interventions consistent with plan of care goals within the next 14 days Nutrition Monitoring and Evaluation:  
Behavioral-Environmental Outcomes: None identified Food/Nutrient Intake Outcomes: Diet advancement/tolerance, Food and nutrient intake, Vitamin/mineral intake Physical Signs/Symptoms Outcomes: Meal time behavior Discharge Planning:   
Continue current diet(comfort feeds as pt tolerates) Electronically signed by Oskar Mack RD on 2/2/2021 at 10:19 AM 
 
Contact: [phone]

## 2021-02-03 NOTE — PROGRESS NOTES
responded to Death of  Marcela Jalloh, who was a 80 y.o.,female, The  provided the following Interventions: 
 was on the floor at the patient's time of death. Offered prayers at the patient's door. Called the patient's niece twice to extend our condolences, but there was no answer. Left a message and will call again. No information on the  home. Plan: 
Chaplains will continue to follow and will provide pastoral care on an as needed/requested basis and grief support for the family. [address] [facility]ual [facility]  
[phone]

## 2021-02-03 NOTE — ROUTINE PROCESS
Post care done. Pt had a watch and it was placed inside of the patient's bag. 
 
 
1950 -called ortiz concerning  home. Yasmany Jackson was unable to reach the family at this time, but will continue to reach out to them. Nursing Supervisor was made aware of this situation. Will send body to [facility].

## 2021-02-05 LAB
BACTERIA SPEC CULT: NORMAL
BACTERIA SPEC CULT: NORMAL
SERVICE CMNT-IMP: NORMAL
SERVICE CMNT-IMP: NORMAL